# Patient Record
Sex: MALE | Race: WHITE | NOT HISPANIC OR LATINO | Employment: UNEMPLOYED | URBAN - METROPOLITAN AREA
[De-identification: names, ages, dates, MRNs, and addresses within clinical notes are randomized per-mention and may not be internally consistent; named-entity substitution may affect disease eponyms.]

---

## 2023-11-19 ENCOUNTER — PHARMACY VISIT (OUTPATIENT)
Dept: PHARMACY | Facility: CLINIC | Age: 28
End: 2023-11-19
Payer: MEDICAID

## 2023-11-19 PROCEDURE — RXMED WILLOW AMBULATORY MEDICATION CHARGE

## 2023-11-19 RX ORDER — ONDANSETRON 4 MG/1
TABLET, ORALLY DISINTEGRATING ORAL
Qty: 8 TABLET | Refills: 0 | OUTPATIENT
Start: 2023-11-19 | End: 2024-03-15 | Stop reason: HOSPADM

## 2023-11-19 RX ORDER — HYDROXYZINE HYDROCHLORIDE 50 MG/1
TABLET, FILM COATED ORAL
Qty: 30 TABLET | Refills: 0 | OUTPATIENT
Start: 2023-11-19 | End: 2023-11-28 | Stop reason: SDUPTHER

## 2023-11-19 RX ORDER — LORAZEPAM 1 MG/1
TABLET ORAL
Qty: 22 TABLET | Refills: 0 | OUTPATIENT
Start: 2023-11-19 | End: 2024-03-15 | Stop reason: HOSPADM

## 2023-11-19 RX ORDER — TRAZODONE HYDROCHLORIDE 50 MG/1
TABLET ORAL
Qty: 10 TABLET | Refills: 0 | OUTPATIENT
Start: 2023-11-19 | End: 2024-03-15 | Stop reason: HOSPADM

## 2023-11-19 RX ORDER — ESCITALOPRAM OXALATE 10 MG/1
10 TABLET ORAL EVERY MORNING
Qty: 10 TABLET | Refills: 0 | OUTPATIENT
Start: 2023-11-19 | End: 2024-03-15 | Stop reason: HOSPADM

## 2023-11-19 RX ORDER — LORAZEPAM 2 MG/1
TABLET ORAL
Qty: 6 TABLET | Refills: 0 | OUTPATIENT
Start: 2023-11-19 | End: 2024-03-15 | Stop reason: HOSPADM

## 2023-11-20 ENCOUNTER — LAB REQUISITION (OUTPATIENT)
Dept: LAB | Facility: HOSPITAL | Age: 28
End: 2023-11-20
Payer: MEDICAID

## 2023-11-20 DIAGNOSIS — Z79.899 OTHER LONG TERM (CURRENT) DRUG THERAPY: ICD-10-CM

## 2023-11-20 LAB
ALBUMIN SERPL BCP-MCNC: 3.1 G/DL (ref 3.4–5)
ALP SERPL-CCNC: 125 U/L (ref 33–120)
ALT SERPL W P-5'-P-CCNC: 53 U/L (ref 10–52)
AMPHETAMINES UR QL SCN: ABNORMAL
ANION GAP SERPL CALC-SCNC: 10 MMOL/L (ref 10–20)
AST SERPL W P-5'-P-CCNC: 79 U/L (ref 9–39)
BARBITURATES UR QL SCN: ABNORMAL
BASOPHILS # BLD AUTO: 0.03 X10*3/UL (ref 0–0.1)
BASOPHILS NFR BLD AUTO: 0.9 %
BENZODIAZ UR QL SCN: ABNORMAL
BILIRUB SERPL-MCNC: 3.4 MG/DL (ref 0–1.2)
BUN SERPL-MCNC: 8 MG/DL (ref 6–23)
BZE UR QL SCN: ABNORMAL
CALCIUM SERPL-MCNC: 8.7 MG/DL (ref 8.6–10.3)
CANNABINOIDS UR QL SCN: ABNORMAL
CHLORIDE SERPL-SCNC: 107 MMOL/L (ref 98–107)
CO2 SERPL-SCNC: 27 MMOL/L (ref 21–32)
CREAT SERPL-MCNC: 0.87 MG/DL (ref 0.5–1.3)
EOSINOPHIL # BLD AUTO: 0.09 X10*3/UL (ref 0–0.7)
EOSINOPHIL NFR BLD AUTO: 2.8 %
ERYTHROCYTE [DISTWIDTH] IN BLOOD BY AUTOMATED COUNT: 17.1 % (ref 11.5–14.5)
ETHANOL SERPL-MCNC: <10 MG/DL
FENTANYL+NORFENTANYL UR QL SCN: ABNORMAL
GFR SERPL CREATININE-BSD FRML MDRD: >90 ML/MIN/1.73M*2
GLUCOSE SERPL-MCNC: 89 MG/DL (ref 74–99)
HAV IGM SER QL: NONREACTIVE
HBV CORE IGM SER QL: NONREACTIVE
HBV SURFACE AG SERPL QL IA: NONREACTIVE
HCT VFR BLD AUTO: 40.9 % (ref 41–52)
HCV AB SER QL: REACTIVE
HGB BLD-MCNC: 13.9 G/DL (ref 13.5–17.5)
HIV 1+2 AB+HIV1 P24 AG SERPL QL IA: NONREACTIVE
IMM GRANULOCYTES # BLD AUTO: 0 X10*3/UL (ref 0–0.7)
IMM GRANULOCYTES NFR BLD AUTO: 0 % (ref 0–0.9)
LYMPHOCYTES # BLD AUTO: 1.32 X10*3/UL (ref 1.2–4.8)
LYMPHOCYTES NFR BLD AUTO: 40.4 %
MCH RBC QN AUTO: 33.4 PG (ref 26–34)
MCHC RBC AUTO-ENTMCNC: 34 G/DL (ref 32–36)
MCV RBC AUTO: 98 FL (ref 80–100)
MONOCYTES # BLD AUTO: 0.28 X10*3/UL (ref 0.1–1)
MONOCYTES NFR BLD AUTO: 8.6 %
NEUTROPHILS # BLD AUTO: 1.55 X10*3/UL (ref 1.2–7.7)
NEUTROPHILS NFR BLD AUTO: 47.3 %
NRBC BLD-RTO: 0 /100 WBCS (ref 0–0)
OPIATES UR QL SCN: ABNORMAL
OXYCODONE+OXYMORPHONE UR QL SCN: ABNORMAL
PCP UR QL SCN: ABNORMAL
PLATELET # BLD AUTO: 109 X10*3/UL (ref 150–450)
POTASSIUM SERPL-SCNC: 3.5 MMOL/L (ref 3.5–5.3)
PROT SERPL-MCNC: 7.8 G/DL (ref 6.4–8.2)
RBC # BLD AUTO: 4.16 X10*6/UL (ref 4.5–5.9)
SODIUM SERPL-SCNC: 140 MMOL/L (ref 136–145)
T PALLIDUM AB SER QL: NONREACTIVE
WBC # BLD AUTO: 3.3 X10*3/UL (ref 4.4–11.3)

## 2023-11-20 PROCEDURE — 82077 ASSAY SPEC XCP UR&BREATH IA: CPT | Mod: OUT | Performed by: NURSE PRACTITIONER

## 2023-11-20 PROCEDURE — 86481 TB AG RESPONSE T-CELL SUSP: CPT | Mod: OUT | Performed by: NURSE PRACTITIONER

## 2023-11-20 PROCEDURE — 80354 DRUG SCREENING FENTANYL: CPT | Mod: OUT,PORLAB | Performed by: NURSE PRACTITIONER

## 2023-11-20 PROCEDURE — 80307 DRUG TEST PRSMV CHEM ANLYZR: CPT | Mod: OUT | Performed by: NURSE PRACTITIONER

## 2023-11-20 PROCEDURE — 80355 GABAPENTIN NON-BLOOD: CPT | Mod: OUT | Performed by: NURSE PRACTITIONER

## 2023-11-20 PROCEDURE — 80053 COMPREHEN METABOLIC PANEL: CPT | Mod: OUT | Performed by: NURSE PRACTITIONER

## 2023-11-20 PROCEDURE — 86780 TREPONEMA PALLIDUM: CPT | Mod: OUT,PORLAB | Performed by: NURSE PRACTITIONER

## 2023-11-20 PROCEDURE — 87389 HIV-1 AG W/HIV-1&-2 AB AG IA: CPT | Mod: OUT,PORLAB | Performed by: NURSE PRACTITIONER

## 2023-11-20 PROCEDURE — 85025 COMPLETE CBC W/AUTO DIFF WBC: CPT | Mod: OUT | Performed by: NURSE PRACTITIONER

## 2023-11-20 PROCEDURE — 80074 ACUTE HEPATITIS PANEL: CPT | Mod: OUT,PORLAB | Performed by: NURSE PRACTITIONER

## 2023-11-20 PROCEDURE — 87522 HEPATITIS C REVRS TRNSCRPJ: CPT | Mod: OUT,PORLAB | Performed by: NURSE PRACTITIONER

## 2023-11-21 ENCOUNTER — PHARMACY VISIT (OUTPATIENT)
Dept: PHARMACY | Facility: CLINIC | Age: 28
End: 2023-11-21
Payer: MEDICAID

## 2023-11-21 PROCEDURE — RXMED WILLOW AMBULATORY MEDICATION CHARGE

## 2023-11-21 RX ORDER — ROPINIROLE 0.25 MG/1
TABLET, FILM COATED ORAL
Qty: 7 TABLET | Refills: 0 | OUTPATIENT
Start: 2023-11-21 | End: 2023-11-25 | Stop reason: SDUPTHER

## 2023-11-22 ENCOUNTER — PHARMACY VISIT (OUTPATIENT)
Dept: PHARMACY | Facility: CLINIC | Age: 28
End: 2023-11-22
Payer: MEDICAID

## 2023-11-22 LAB
BUPRENORPHINE SCREEN, URINE: NEGATIVE NG/ML
DRUG SCREEN COMMENT UR-IMP: NORMAL
HCV RNA SERPL NAA+PROBE-ACNC: NOT DETECTED K[IU]/ML
HCV RNA SERPL NAA+PROBE-LOG IU: NORMAL {LOG_IU}/ML

## 2023-11-22 PROCEDURE — RXMED WILLOW AMBULATORY MEDICATION CHARGE

## 2023-11-22 RX ORDER — PRAZOSIN HYDROCHLORIDE 2 MG/1
2 CAPSULE ORAL NIGHTLY
Qty: 10 CAPSULE | Refills: 0 | OUTPATIENT
Start: 2023-11-22 | End: 2024-03-15 | Stop reason: HOSPADM

## 2023-11-23 LAB
NIL(NEG) CONTROL SPOT COUNT: NORMAL
PANEL A SPOT COUNT: 2
PANEL B SPOT COUNT: 1
POS CONTROL SPOT COUNT: NORMAL
T-SPOT. TB INTERPRETATION: NEGATIVE

## 2023-11-24 LAB
FENTANYL UR CFM-MCNC: <2.5 NG/ML
GABAPENTIN UR-MCNC: <5 UG/ML
NORFENTANYL UR CFM-MCNC: <2.5 NG/ML

## 2023-11-26 ENCOUNTER — PHARMACY VISIT (OUTPATIENT)
Dept: PHARMACY | Facility: CLINIC | Age: 28
End: 2023-11-26
Payer: MEDICAID

## 2023-11-26 PROCEDURE — RXMED WILLOW AMBULATORY MEDICATION CHARGE

## 2023-11-28 ENCOUNTER — PHARMACY VISIT (OUTPATIENT)
Dept: PHARMACY | Facility: CLINIC | Age: 28
End: 2023-11-28
Payer: MEDICAID

## 2023-11-28 PROCEDURE — RXMED WILLOW AMBULATORY MEDICATION CHARGE

## 2023-11-28 RX ORDER — HYDROXYZINE HYDROCHLORIDE 50 MG/1
TABLET, FILM COATED ORAL
Qty: 30 TABLET | Refills: 0 | OUTPATIENT
Start: 2023-11-28 | End: 2024-03-15 | Stop reason: HOSPADM

## 2024-03-10 ENCOUNTER — HOSPITAL ENCOUNTER (EMERGENCY)
Facility: HOSPITAL | Age: 29
Discharge: PSYCHIATRIC OR SUBSTANCE USE FACILITY | End: 2024-03-11
Attending: EMERGENCY MEDICINE
Payer: MEDICAID

## 2024-03-10 ENCOUNTER — APPOINTMENT (OUTPATIENT)
Dept: CARDIOLOGY | Facility: HOSPITAL | Age: 29
End: 2024-03-10
Payer: MEDICAID

## 2024-03-10 DIAGNOSIS — R45.851 SUICIDAL IDEATION: Primary | ICD-10-CM

## 2024-03-10 DIAGNOSIS — F10.920 ALCOHOLIC INTOXICATION WITHOUT COMPLICATION (CMS-HCC): ICD-10-CM

## 2024-03-10 LAB
ALBUMIN SERPL BCP-MCNC: 3.9 G/DL (ref 3.4–5)
ALP SERPL-CCNC: 97 U/L (ref 33–120)
ALT SERPL W P-5'-P-CCNC: 69 U/L (ref 10–52)
ANION GAP SERPL CALC-SCNC: 14 MMOL/L (ref 10–20)
APAP SERPL-MCNC: <10 UG/ML
AST SERPL W P-5'-P-CCNC: 84 U/L (ref 9–39)
BASOPHILS # BLD AUTO: 0.03 X10*3/UL (ref 0–0.1)
BASOPHILS NFR BLD AUTO: 0.4 %
BILIRUB SERPL-MCNC: 2.3 MG/DL (ref 0–1.2)
BUN SERPL-MCNC: 10 MG/DL (ref 6–23)
CALCIUM SERPL-MCNC: 8.4 MG/DL (ref 8.6–10.3)
CHLORIDE SERPL-SCNC: 102 MMOL/L (ref 98–107)
CO2 SERPL-SCNC: 25 MMOL/L (ref 21–32)
CREAT SERPL-MCNC: 0.8 MG/DL (ref 0.5–1.3)
EGFRCR SERPLBLD CKD-EPI 2021: >90 ML/MIN/1.73M*2
EOSINOPHIL # BLD AUTO: 0.03 X10*3/UL (ref 0–0.7)
EOSINOPHIL NFR BLD AUTO: 0.4 %
ERYTHROCYTE [DISTWIDTH] IN BLOOD BY AUTOMATED COUNT: 15.2 % (ref 11.5–14.5)
ETHANOL SERPL-MCNC: 314 MG/DL
GLUCOSE SERPL-MCNC: 120 MG/DL (ref 74–99)
HCT VFR BLD AUTO: 40 % (ref 41–52)
HGB BLD-MCNC: 14.1 G/DL (ref 13.5–17.5)
IMM GRANULOCYTES # BLD AUTO: 0.01 X10*3/UL (ref 0–0.7)
IMM GRANULOCYTES NFR BLD AUTO: 0.1 % (ref 0–0.9)
LYMPHOCYTES # BLD AUTO: 1.91 X10*3/UL (ref 1.2–4.8)
LYMPHOCYTES NFR BLD AUTO: 27.8 %
MCH RBC QN AUTO: 33.7 PG (ref 26–34)
MCHC RBC AUTO-ENTMCNC: 35.3 G/DL (ref 32–36)
MCV RBC AUTO: 96 FL (ref 80–100)
MONOCYTES # BLD AUTO: 0.57 X10*3/UL (ref 0.1–1)
MONOCYTES NFR BLD AUTO: 8.3 %
NEUTROPHILS # BLD AUTO: 4.33 X10*3/UL (ref 1.2–7.7)
NEUTROPHILS NFR BLD AUTO: 63 %
NRBC BLD-RTO: 0 /100 WBCS (ref 0–0)
PLATELET # BLD AUTO: 122 X10*3/UL (ref 150–450)
POTASSIUM SERPL-SCNC: 3.4 MMOL/L (ref 3.5–5.3)
PROT SERPL-MCNC: 7.4 G/DL (ref 6.4–8.2)
RBC # BLD AUTO: 4.19 X10*6/UL (ref 4.5–5.9)
SALICYLATES SERPL-MCNC: <3 MG/DL
SODIUM SERPL-SCNC: 138 MMOL/L (ref 136–145)
WBC # BLD AUTO: 6.9 X10*3/UL (ref 4.4–11.3)

## 2024-03-10 PROCEDURE — 2500000001 HC RX 250 WO HCPCS SELF ADMINISTERED DRUGS (ALT 637 FOR MEDICARE OP): Performed by: EMERGENCY MEDICINE

## 2024-03-10 PROCEDURE — 99285 EMERGENCY DEPT VISIT HI MDM: CPT

## 2024-03-10 PROCEDURE — 80143 DRUG ASSAY ACETAMINOPHEN: CPT | Performed by: EMERGENCY MEDICINE

## 2024-03-10 PROCEDURE — 93005 ELECTROCARDIOGRAM TRACING: CPT

## 2024-03-10 PROCEDURE — 80053 COMPREHEN METABOLIC PANEL: CPT | Performed by: EMERGENCY MEDICINE

## 2024-03-10 PROCEDURE — 85025 COMPLETE CBC W/AUTO DIFF WBC: CPT | Performed by: EMERGENCY MEDICINE

## 2024-03-10 PROCEDURE — 36415 COLL VENOUS BLD VENIPUNCTURE: CPT | Performed by: EMERGENCY MEDICINE

## 2024-03-10 RX ORDER — LORAZEPAM 0.5 MG/1
1 TABLET ORAL ONCE
Status: COMPLETED | OUTPATIENT
Start: 2024-03-10 | End: 2024-03-10

## 2024-03-10 RX ADMIN — LORAZEPAM 1 MG: 0.5 TABLET ORAL at 22:50

## 2024-03-10 SDOH — HEALTH STABILITY: MENTAL HEALTH: HAVE YOU ACTUALLY HAD ANY THOUGHTS OF KILLING YOURSELF?: NO

## 2024-03-10 SDOH — HEALTH STABILITY: MENTAL HEALTH: HAVE YOU WISHED YOU WERE DEAD OR WISHED YOU COULD GO TO SLEEP AND NOT WAKE UP?: YES

## 2024-03-10 SDOH — HEALTH STABILITY: MENTAL HEALTH: BEHAVIORS/MOOD: COOPERATIVE;ANXIOUS;TEARFUL

## 2024-03-10 SDOH — HEALTH STABILITY: MENTAL HEALTH: BEHAVIORS/MOOD: ANXIOUS;COOPERATIVE;APPROPRIATE FOR AGE;APPROPRIATE FOR SITUATION;TEARFUL

## 2024-03-10 SDOH — HEALTH STABILITY: MENTAL HEALTH: BEHAVIORS/MOOD: SLEEPING

## 2024-03-10 SDOH — HEALTH STABILITY: MENTAL HEALTH: HAVE YOU EVER DONE ANYTHING, STARTED TO DO ANYTHING, OR PREPARED TO DO ANYTHING TO END YOUR LIFE?: NO

## 2024-03-10 SDOH — HEALTH STABILITY: MENTAL HEALTH: SUICIDE ASSESSMENT: ADULT (C-SSRS)

## 2024-03-10 SDOH — HEALTH STABILITY: MENTAL HEALTH: CONTENT: UNREMARKABLE

## 2024-03-10 SDOH — HEALTH STABILITY: MENTAL HEALTH

## 2024-03-10 ASSESSMENT — LIFESTYLE VARIABLES
NAUSEA AND VOMITING: NO NAUSEA AND NO VOMITING
AGITATION: NORMAL ACTIVITY
PAROXYSMAL SWEATS: NO SWEAT VISIBLE
ANXIETY: 2
AUDITORY DISTURBANCES: NOT PRESENT
HEADACHE, FULLNESS IN HEAD: NOT PRESENT
TREMOR: NO TREMOR
BLOOD PRESSURE: 139/80
TOTAL SCORE: 2
PULSE: 102
VISUAL DISTURBANCES: NOT PRESENT
ORIENTATION AND CLOUDING OF SENSORIUM: ORIENTED AND CAN DO SERIAL ADDITIONS

## 2024-03-10 NOTE — ED PROVIDER NOTES
HPI   Chief Complaint   Patient presents with    Suicidal    Alcohol Problem       29-year-old male who presented through triage for suicidal ideation and alcohol abuse.  Patient states that he drinks daily his last drink was right prior to coming in.  He states he has been having thoughts of suicide but has no plan.  He also admits to smoking marijuana.  He states he has a history of cirrhosis secondary to his alcoholism and mental health issues.                          Sally Coma Scale Score: 15                     Patient History   History reviewed. No pertinent past medical history.  History reviewed. No pertinent surgical history.  No family history on file.  Social History     Tobacco Use    Smoking status: Not on file    Smokeless tobacco: Not on file   Substance Use Topics    Alcohol use: Not on file    Drug use: Not on file       Physical Exam   ED Triage Vitals [03/10/24 1912]   Temperature Heart Rate Respirations BP   37 °C (98.6 °F) (!) 102 18 139/80      Pulse Ox Temp Source Heart Rate Source Patient Position   97 % Temporal -- --      BP Location FiO2 (%)     -- --       Physical Exam  Constitutional:       Appearance: Normal appearance. He is normal weight.   HENT:      Head: Normocephalic and atraumatic.      Nose: Nose normal.      Mouth/Throat:      Mouth: Mucous membranes are moist.      Pharynx: Oropharynx is clear.   Eyes:      Extraocular Movements: Extraocular movements intact.      Conjunctiva/sclera: Conjunctivae normal.      Pupils: Pupils are equal, round, and reactive to light.   Cardiovascular:      Rate and Rhythm: Normal rate and regular rhythm.   Pulmonary:      Effort: Pulmonary effort is normal.      Breath sounds: Normal breath sounds.   Abdominal:      General: Abdomen is flat. Bowel sounds are normal.      Palpations: Abdomen is soft.   Musculoskeletal:         General: Normal range of motion.      Cervical back: Normal range of motion and neck supple.   Skin:     General:  Skin is warm and dry.      Capillary Refill: Capillary refill takes less than 2 seconds.   Neurological:      General: No focal deficit present.      Mental Status: He is alert.   Psychiatric:         Mood and Affect: Mood normal.         Behavior: Behavior normal.         Thought Content: Thought content normal.         Judgment: Judgment normal.       Labs Reviewed   CBC WITH AUTO DIFFERENTIAL - Abnormal       Result Value    WBC 6.9      nRBC 0.0      RBC 4.19 (*)     Hemoglobin 14.1      Hematocrit 40.0 (*)     MCV 96      MCH 33.7      MCHC 35.3      RDW 15.2 (*)     Platelets 122 (*)     Neutrophils % 63.0      Immature Granulocytes %, Automated 0.1      Lymphocytes % 27.8      Monocytes % 8.3      Eosinophils % 0.4      Basophils % 0.4      Neutrophils Absolute 4.33      Immature Granulocytes Absolute, Automated 0.01      Lymphocytes Absolute 1.91      Monocytes Absolute 0.57      Eosinophils Absolute 0.03      Basophils Absolute 0.03     COMPREHENSIVE METABOLIC PANEL - Abnormal    Glucose 120 (*)     Sodium 138      Potassium 3.4 (*)     Chloride 102      Bicarbonate 25      Anion Gap 14      Urea Nitrogen 10      Creatinine 0.80      eGFR >90      Calcium 8.4 (*)     Albumin 3.9      Alkaline Phosphatase 97      Total Protein 7.4      AST 84 (*)     Bilirubin, Total 2.3 (*)     ALT 69 (*)    DRUG SCREEN,URINE - Abnormal    Amphetamine Screen, Urine Presumptive Negative      Barbiturate Screen, Urine Presumptive Negative      Benzodiazepines Screen, Urine Presumptive Negative      Cannabinoid Screen, Urine Presumptive Positive (*)     Cocaine Metabolite Screen, Urine Presumptive Negative      Fentanyl Screen, Urine Presumptive Negative      Opiate Screen, Urine Presumptive Negative      Oxycodone Screen, Urine Presumptive Negative      PCP Screen, Urine Presumptive Negative      Methadone Screen, Urine Presumptive Negative      Narrative:     Drug screen results are presumptive and should not be used to  assess   compliance with prescribed medication. Contact the performing [facility] [[email or web address]] laboratory   to add-on definitive confirmatory testing if clinically indicated.    Toxicology screening results are reported qualitatively. The concentration must   be greater than or equal to the cutoff to be reported as positive. The concentration   at which the screening test can detect an individual drug or metabolite varies.   The absence of expected drug(s) and/or drug metabolite(s) may indicate non-compliance,   inappropriate timing of specimen collection relative to drug administration, poor drug   absorption, diluted/adulterated urine, or limitations of testing. For medical purposes   only; not valid for forensic use.    Interpretive questions should be directed to the laboratory medical directors.   ACUTE TOXICOLOGY PANEL, BLOOD - Abnormal    Acetaminophen <10.0      Salicylate  <3      Alcohol 314 (*)    ALCOHOL - Abnormal    Alcohol 168 (*)    URINALYSIS WITH REFLEX MICROSCOPIC - Abnormal    Color, Urine Tanya (*)     Appearance, Urine Hazy (*)     Specific Gravity, Urine 1.034      pH, Urine 5.0      Protein, Urine 100 (2+) (*)     Glucose, Urine NEGATIVE      Blood, Urine LARGE (3+) (*)     Ketones, Urine NEGATIVE      Bilirubin, Urine SMALL (1+) (*)     Urobilinogen, Urine 4.0 (*)     Nitrite, Urine NEGATIVE      Leukocyte Esterase, Urine NEGATIVE     MICROSCOPIC ONLY, URINE - Abnormal    WBC, Urine 1-5      RBC, Urine 11-20 (*)     Mucus, Urine 4+      Hyaline Casts, Urine 4+ (*)    SARS-COV-2 AND INFLUENZA A/B PCR - Normal    Flu A Result Not Detected      Flu B Result Not Detected      Coronavirus 2019, PCR Not Detected      Narrative:     This assay has received FDA Emergency Use Authorization (EUA) and  is only authorized for the duration of time that circumstances exist to justify the authorization of the emergency use of in vitro diagnostic tests for the detection of SARS-CoV-2 virus and/or diagnosis of COVID-19  infection under section 564(b)(1) of the Act, 21 U.S.C. 360bbb-3(b)(1). Testing for SARS-CoV-2 is only recommended for patients who meet current clinical and/or epidemiological criteria as defined by federal, state, or local public health directives. This assay is an in vitro diagnostic nucleic acid amplification test for the qualitative detection of SARS-CoV-2, Influenza A, and Influenza B from nasopharyngeal specimens and has been validated for use at [facility]. Negative results do not preclude COVID-19 infections or Influenza A/B infections, and should not be used as the sole basis for diagnosis, treatment, or other management decisions. If Influenza A/B and RSV PCR results are negative, testing for Parainfluenza virus, Adenovirus and Metapneumovirus is routinely performed for [facility] pediatric oncology and intensive care inpatients, and is available on other patients by placing an add-on request.          ED Course & MDM   ED Course as of 03/11/24 2044   Mon Mar 11, 2024   0732 I received signout patient is pending placement for suicidal ideations [ZS]   1506 Patient accepted to [facility] by  [ZS]   1606 Patient has chronically elevated bilirubin and AST and ALT which are no acutely different today.  He has no right upper quadrant abdominal pain. [ZS]   1607 He was medically cleared by previous attending. [ZS]      ED Course User Index  [ZS] Buck Valerio MD         Diagnoses as of 03/11/24 2044   Suicidal ideation   Alcoholic intoxication without complication (CMS/HCC)       Medical Decision Making  Emergency department course, laboratory studies were obtained and reviewed.  Patient's alcohol level is elevated at 314.  Patient will be evaluated by EPAT.  I feel he is medically cleared for psychiatric evaluation.    Amount and/or Complexity of Data Reviewed  ECG/medicine tests: independent interpretation performed.     Details: EKG shows a normal sinus rhythm at a rate of  98 with a prolonged QTc of 491 and nonspecific ST-T wave changes, interpreted by ED physician.        Procedure  Procedures     Gina Lan,   03/10/24 2201       Gina Lan,   03/11/24 2044

## 2024-03-11 ENCOUNTER — HOSPITAL ENCOUNTER (INPATIENT)
Facility: HOSPITAL | Age: 29
LOS: 4 days | Discharge: HOME | End: 2024-03-15
Attending: PSYCHIATRY & NEUROLOGY | Admitting: PSYCHIATRY & NEUROLOGY
Payer: MEDICAID

## 2024-03-11 VITALS
HEART RATE: 98 BPM | RESPIRATION RATE: 20 BRPM | OXYGEN SATURATION: 98 % | SYSTOLIC BLOOD PRESSURE: 118 MMHG | TEMPERATURE: 98.6 F | DIASTOLIC BLOOD PRESSURE: 64 MMHG

## 2024-03-11 DIAGNOSIS — K70.31 ASCITES DUE TO ALCOHOLIC CIRRHOSIS (MULTI): ICD-10-CM

## 2024-03-11 DIAGNOSIS — F99 MENTAL DISORDER: Primary | ICD-10-CM

## 2024-03-11 LAB
AMPHETAMINES UR QL SCN: ABNORMAL
APPEARANCE UR: ABNORMAL
BARBITURATES UR QL SCN: ABNORMAL
BENZODIAZ UR QL SCN: ABNORMAL
BILIRUB UR STRIP.AUTO-MCNC: ABNORMAL MG/DL
BZE UR QL SCN: ABNORMAL
CANNABINOIDS UR QL SCN: ABNORMAL
COLOR UR: ABNORMAL
ETHANOL SERPL-MCNC: 168 MG/DL
FENTANYL+NORFENTANYL UR QL SCN: ABNORMAL
FLUAV RNA RESP QL NAA+PROBE: NOT DETECTED
FLUBV RNA RESP QL NAA+PROBE: NOT DETECTED
GLUCOSE UR STRIP.AUTO-MCNC: NEGATIVE MG/DL
HYALINE CASTS #/AREA URNS AUTO: ABNORMAL /LPF
KETONES UR STRIP.AUTO-MCNC: NEGATIVE MG/DL
LEUKOCYTE ESTERASE UR QL STRIP.AUTO: NEGATIVE
METHADONE UR QL SCN: ABNORMAL
MUCOUS THREADS #/AREA URNS AUTO: ABNORMAL /LPF
NITRITE UR QL STRIP.AUTO: NEGATIVE
OPIATES UR QL SCN: ABNORMAL
OXYCODONE+OXYMORPHONE UR QL SCN: ABNORMAL
PCP UR QL SCN: ABNORMAL
PH UR STRIP.AUTO: 5 [PH]
PROT UR STRIP.AUTO-MCNC: ABNORMAL MG/DL
RBC # UR STRIP.AUTO: ABNORMAL /UL
RBC #/AREA URNS AUTO: ABNORMAL /HPF
SARS-COV-2 RNA RESP QL NAA+PROBE: NOT DETECTED
SP GR UR STRIP.AUTO: 1.03
UROBILINOGEN UR STRIP.AUTO-MCNC: 4 MG/DL
WBC #/AREA URNS AUTO: ABNORMAL /HPF

## 2024-03-11 PROCEDURE — 2500000001 HC RX 250 WO HCPCS SELF ADMINISTERED DRUGS (ALT 637 FOR MEDICARE OP): Performed by: REGISTERED NURSE

## 2024-03-11 PROCEDURE — 81001 URINALYSIS AUTO W/SCOPE: CPT | Mod: 59 | Performed by: STUDENT IN AN ORGANIZED HEALTH CARE EDUCATION/TRAINING PROGRAM

## 2024-03-11 PROCEDURE — 2500000001 HC RX 250 WO HCPCS SELF ADMINISTERED DRUGS (ALT 637 FOR MEDICARE OP): Performed by: EMERGENCY MEDICINE

## 2024-03-11 PROCEDURE — 1240000001 HC SEMI-PRIVATE BH ROOM DAILY

## 2024-03-11 PROCEDURE — 82077 ASSAY SPEC XCP UR&BREATH IA: CPT | Performed by: EMERGENCY MEDICINE

## 2024-03-11 PROCEDURE — 87636 SARSCOV2 & INF A&B AMP PRB: CPT | Performed by: STUDENT IN AN ORGANIZED HEALTH CARE EDUCATION/TRAINING PROGRAM

## 2024-03-11 PROCEDURE — 2500000002 HC RX 250 W HCPCS SELF ADMINISTERED DRUGS (ALT 637 FOR MEDICARE OP, ALT 636 FOR OP/ED): Performed by: EMERGENCY MEDICINE

## 2024-03-11 PROCEDURE — 36415 COLL VENOUS BLD VENIPUNCTURE: CPT | Performed by: EMERGENCY MEDICINE

## 2024-03-11 PROCEDURE — 80307 DRUG TEST PRSMV CHEM ANLYZR: CPT | Performed by: EMERGENCY MEDICINE

## 2024-03-11 RX ORDER — LORAZEPAM 0.5 MG/1
0.5 TABLET ORAL EVERY 2 HOUR PRN
Status: DISCONTINUED | OUTPATIENT
Start: 2024-03-11 | End: 2024-03-13

## 2024-03-11 RX ORDER — LORAZEPAM 1 MG/1
2 TABLET ORAL EVERY 2 HOUR PRN
Status: DISCONTINUED | OUTPATIENT
Start: 2024-03-11 | End: 2024-03-13

## 2024-03-11 RX ORDER — TRAZODONE HYDROCHLORIDE 50 MG/1
50 TABLET ORAL NIGHTLY PRN
Status: DISCONTINUED | OUTPATIENT
Start: 2024-03-11 | End: 2024-03-15 | Stop reason: HOSPADM

## 2024-03-11 RX ORDER — IBUPROFEN 600 MG/1
600 TABLET ORAL EVERY 8 HOURS PRN
Status: DISCONTINUED | OUTPATIENT
Start: 2024-03-11 | End: 2024-03-15 | Stop reason: HOSPADM

## 2024-03-11 RX ORDER — IBUPROFEN 200 MG
1 TABLET ORAL DAILY
Status: DISCONTINUED | OUTPATIENT
Start: 2024-03-11 | End: 2024-03-15 | Stop reason: HOSPADM

## 2024-03-11 RX ORDER — FOLIC ACID 1 MG/1
1 TABLET ORAL DAILY
Status: DISCONTINUED | OUTPATIENT
Start: 2024-03-12 | End: 2024-03-15 | Stop reason: HOSPADM

## 2024-03-11 RX ORDER — DIPHENHYDRAMINE HYDROCHLORIDE 50 MG/ML
50 INJECTION INTRAMUSCULAR; INTRAVENOUS ONCE AS NEEDED
Status: DISCONTINUED | OUTPATIENT
Start: 2024-03-11 | End: 2024-03-15 | Stop reason: HOSPADM

## 2024-03-11 RX ORDER — IBUPROFEN 200 MG
1 TABLET ORAL DAILY
Status: DISCONTINUED | OUTPATIENT
Start: 2024-03-11 | End: 2024-03-11 | Stop reason: HOSPADM

## 2024-03-11 RX ORDER — LORAZEPAM 1 MG/1
1 TABLET ORAL EVERY 2 HOUR PRN
Status: DISCONTINUED | OUTPATIENT
Start: 2024-03-11 | End: 2024-03-13

## 2024-03-11 RX ORDER — MAGNESIUM HYDROXIDE 2400 MG/10ML
10 SUSPENSION ORAL DAILY PRN
Status: DISCONTINUED | OUTPATIENT
Start: 2024-03-11 | End: 2024-03-15 | Stop reason: HOSPADM

## 2024-03-11 RX ORDER — DIPHENHYDRAMINE HCL 25 MG
50 TABLET ORAL EVERY 6 HOURS PRN
Status: DISCONTINUED | OUTPATIENT
Start: 2024-03-11 | End: 2024-03-15 | Stop reason: HOSPADM

## 2024-03-11 RX ORDER — DIPHENHYDRAMINE HCL 25 MG
25 CAPSULE ORAL ONCE
Status: DISCONTINUED | OUTPATIENT
Start: 2024-03-11 | End: 2024-03-11

## 2024-03-11 RX ORDER — TRAZODONE HYDROCHLORIDE 50 MG/1
50 TABLET ORAL ONCE
Status: COMPLETED | OUTPATIENT
Start: 2024-03-11 | End: 2024-03-11

## 2024-03-11 RX ORDER — IBUPROFEN 800 MG/1
800 TABLET ORAL ONCE
Status: COMPLETED | OUTPATIENT
Start: 2024-03-11 | End: 2024-03-11

## 2024-03-11 RX ORDER — QUETIAPINE FUMARATE 25 MG/1
25 TABLET, FILM COATED ORAL ONCE
Status: COMPLETED | OUTPATIENT
Start: 2024-03-11 | End: 2024-03-11

## 2024-03-11 RX ORDER — MULTIVIT-MIN/IRON FUM/FOLIC AC 7.5 MG-4
1 TABLET ORAL DAILY
Status: DISCONTINUED | OUTPATIENT
Start: 2024-03-12 | End: 2024-03-15 | Stop reason: HOSPADM

## 2024-03-11 RX ORDER — HYDROXYZINE HYDROCHLORIDE 25 MG/1
50 TABLET, FILM COATED ORAL EVERY 6 HOURS PRN
Status: DISCONTINUED | OUTPATIENT
Start: 2024-03-11 | End: 2024-03-15 | Stop reason: HOSPADM

## 2024-03-11 RX ORDER — LANOLIN ALCOHOL/MO/W.PET/CERES
100 CREAM (GRAM) TOPICAL DAILY
Status: DISCONTINUED | OUTPATIENT
Start: 2024-03-12 | End: 2024-03-11

## 2024-03-11 RX ORDER — ALUMINUM HYDROXIDE, MAGNESIUM HYDROXIDE, AND SIMETHICONE 1200; 120; 1200 MG/30ML; MG/30ML; MG/30ML
10 SUSPENSION ORAL 4 TIMES DAILY PRN
Status: DISCONTINUED | OUTPATIENT
Start: 2024-03-11 | End: 2024-03-15 | Stop reason: HOSPADM

## 2024-03-11 RX ORDER — LANOLIN ALCOHOL/MO/W.PET/CERES
100 CREAM (GRAM) TOPICAL DAILY
Status: DISCONTINUED | OUTPATIENT
Start: 2024-03-12 | End: 2024-03-15 | Stop reason: HOSPADM

## 2024-03-11 RX ADMIN — TRAZODONE HYDROCHLORIDE 50 MG: 50 TABLET ORAL at 21:52

## 2024-03-11 RX ADMIN — QUETIAPINE FUMARATE 25 MG: 25 TABLET ORAL at 01:07

## 2024-03-11 RX ADMIN — LORAZEPAM 1 MG: 1 TABLET ORAL at 21:52

## 2024-03-11 RX ADMIN — IBUPROFEN 800 MG: 800 TABLET, FILM COATED ORAL at 01:07

## 2024-03-11 RX ADMIN — TRAZODONE HYDROCHLORIDE 50 MG: 50 TABLET ORAL at 01:07

## 2024-03-11 SDOH — HEALTH STABILITY: MENTAL HEALTH: ANXIETY SYMPTOMS: NO PROBLEMS REPORTED OR OBSERVED.

## 2024-03-11 SDOH — HEALTH STABILITY: MENTAL HEALTH: BEHAVIORS/MOOD: ANXIOUS;COOPERATIVE

## 2024-03-11 SDOH — SOCIAL STABILITY: SOCIAL INSECURITY: DO YOU FEEL ANYONE HAS EXPLOITED OR TAKEN ADVANTAGE OF YOU FINANCIALLY OR OF YOUR PERSONAL PROPERTY?: NO

## 2024-03-11 SDOH — HEALTH STABILITY: MENTAL HEALTH: ARE YOU HAVING THOUGHTS OF KILLING YOURSELF RIGHT NOW?: YES

## 2024-03-11 SDOH — HEALTH STABILITY: MENTAL HEALTH: HAVE YOU ACTUALLY HAD ANY THOUGHTS OF KILLING YOURSELF?: NO

## 2024-03-11 SDOH — HEALTH STABILITY: MENTAL HEALTH: BEHAVIORS/MOOD: SLEEPING

## 2024-03-11 SDOH — HEALTH STABILITY: MENTAL HEALTH: HAVE YOU EVER TRIED TO KILL YOURSELF?: YES

## 2024-03-11 SDOH — HEALTH STABILITY: MENTAL HEALTH: BEHAVIORS/MOOD: CALM;COOPERATIVE

## 2024-03-11 SDOH — HEALTH STABILITY: MENTAL HEALTH: ACTIVE SUICIDAL IDEATION WITH SPECIFIC PLAN AND INTENT (PAST 1 MONTH): NO

## 2024-03-11 SDOH — SOCIAL STABILITY: SOCIAL INSECURITY: ARE THERE ANY APPARENT SIGNS OF INJURIES/BEHAVIORS THAT COULD BE RELATED TO ABUSE/NEGLECT?: NO

## 2024-03-11 SDOH — SOCIAL STABILITY: SOCIAL INSECURITY: DOES ANYONE TRY TO KEEP YOU FROM HAVING/CONTACTING OTHER FRIENDS OR DOING THINGS OUTSIDE YOUR HOME?: NO

## 2024-03-11 SDOH — HEALTH STABILITY: MENTAL HEALTH: IN THE PAST WEEK, HAVE YOU BEEN HAVING THOUGHTS ABOUT KILLING YOURSELF?: YES

## 2024-03-11 SDOH — ECONOMIC STABILITY: GENERAL: FINANCIAL CONCERNS: UNABLE TO PAY BILLS

## 2024-03-11 SDOH — HEALTH STABILITY: MENTAL HEALTH: CONTENT: UNREMARKABLE

## 2024-03-11 SDOH — SOCIAL STABILITY: SOCIAL INSECURITY: ABUSE: ADULT

## 2024-03-11 SDOH — HEALTH STABILITY: MENTAL HEALTH: IN THE PAST FEW WEEKS, HAVE YOU FELT THAT YOU OR YOUR FAMILY WOULD BE BETTER OFF IF YOU WERE DEAD?: YES

## 2024-03-11 SDOH — HEALTH STABILITY: MENTAL HEALTH: SUICIDAL BEHAVIOR (3 MONTHS): NO

## 2024-03-11 SDOH — HEALTH STABILITY: MENTAL HEALTH: IN THE PAST FEW WEEKS, HAVE YOU WISHED YOU WERE DEAD?: YES

## 2024-03-11 SDOH — HEALTH STABILITY: MENTAL HEALTH: ACTIVE SUICIDAL IDEATION WITH SOME INTENT TO ACT, WITHOUT SPECIFIC PLAN (PAST 1 MONTH): YES

## 2024-03-11 SDOH — HEALTH STABILITY: MENTAL HEALTH: SUICIDE ASSESSMENT: ADULT (C-SSRS)

## 2024-03-11 SDOH — HEALTH STABILITY: MENTAL HEALTH: HAVE YOU EVER DONE ANYTHING, STARTED TO DO ANYTHING, OR PREPARED TO DO ANYTHING TO END YOUR LIFE?: NO

## 2024-03-11 SDOH — HEALTH STABILITY: MENTAL HEALTH: HOW DID YOU TRY TO KILL YOURSELF?: PT WOULD NOT STATE

## 2024-03-11 SDOH — HEALTH STABILITY: MENTAL HEALTH: WISH TO BE DEAD (PAST 1 MONTH): YES

## 2024-03-11 SDOH — SOCIAL STABILITY: SOCIAL INSECURITY: ARE YOU OR HAVE YOU BEEN THREATENED OR ABUSED PHYSICALLY, EMOTIONALLY, OR SEXUALLY BY ANYONE?: NO

## 2024-03-11 SDOH — SOCIAL STABILITY: SOCIAL INSECURITY: WERE YOU ABLE TO COMPLETE ALL THE BEHAVIORAL HEALTH SCREENINGS?: YES

## 2024-03-11 SDOH — SOCIAL STABILITY: SOCIAL INSECURITY: HAVE YOU HAD THOUGHTS OF HARMING ANYONE ELSE?: NO

## 2024-03-11 SDOH — HEALTH STABILITY: MENTAL HEALTH
DEPRESSION SYMPTOMS: CRYING;FEELINGS OF HELPLESSNESS;FEELINGS OF HOPELESSESS;FEELINGS OF WORTHLESSNESS;INCREASED IRRITABILITY;ISOLATIVE;LOSS OF INTEREST;SLEEP DISTURBANCE

## 2024-03-11 SDOH — SOCIAL STABILITY: SOCIAL INSECURITY: HAS ANYONE EVER THREATENED TO HURT YOUR FAMILY OR YOUR PETS?: NO

## 2024-03-11 SDOH — HEALTH STABILITY: MENTAL HEALTH: HAVE YOU WISHED YOU WERE DEAD OR WISHED YOU COULD GO TO SLEEP AND NOT WAKE UP?: YES

## 2024-03-11 SDOH — HEALTH STABILITY: MENTAL HEALTH: SUICIDAL BEHAVIOR (LIFETIME): YES

## 2024-03-11 SDOH — SOCIAL STABILITY: SOCIAL INSECURITY: DO YOU FEEL UNSAFE GOING BACK TO THE PLACE WHERE YOU ARE LIVING?: NO

## 2024-03-11 SDOH — HEALTH STABILITY: MENTAL HEALTH

## 2024-03-11 SDOH — HEALTH STABILITY: MENTAL HEALTH: NON-SPECIFIC ACTIVE SUICIDAL THOUGHTS (PAST 1 MONTH): YES

## 2024-03-11 ASSESSMENT — LIFESTYLE VARIABLES
EVER HAD A DRINK FIRST THING IN THE MORNING TO STEADY YOUR NERVES TO GET RID OF A HANGOVER: NO
HOW MANY STANDARD DRINKS CONTAINING ALCOHOL DO YOU HAVE ON A TYPICAL DAY: 10 OR MORE
VISUAL DISTURBANCES: NOT PRESENT
HAVE YOU EVER FELT YOU SHOULD CUT DOWN ON YOUR DRINKING: NO
HOW OFTEN DO YOU HAVE 6 OR MORE DRINKS ON ONE OCCASION: DAILY OR ALMOST DAILY
PRESCIPTION_ABUSE_PAST_12_MONTHS: NO
PULSE: 80
PAROXYSMAL SWEATS: BARELY PERCEPTIBLE SWEATING, PALMS MOIST
HAVE PEOPLE ANNOYED YOU BY CRITICIZING YOUR DRINKING: NO
HOW OFTEN DO YOU HAVE A DRINK CONTAINING ALCOHOL: 4 OR MORE TIMES A WEEK
VISUAL DISTURBANCES: NOT PRESENT
TOTAL SCORE: 8
TOTAL_SCORE: 6
TREMOR: NOT VISIBLE, BUT CAN BE FELT FINGERTIP TO FINGERTIP
EVER FELT BAD OR GUILTY ABOUT YOUR DRINKING: NO
PULSE: 76
SUBSTANCE_ABUSE_PAST_12_MONTHS: YES
SKIP TO QUESTIONS 9-10: 0
CIWA TOTAL SCORE: 2
HEADACHE, FULLNESS IN HEAD: NOT PRESENT
AUDITORY DISTURBANCES: NOT PRESENT
AUDIT-C TOTAL SCORE: 12
AUDITORY DISTURBANCES: NOT PRESENT
AGITATION: 2
TREMOR: NOT VISIBLE, BUT CAN BE FELT FINGERTIP TO FINGERTIP
NAUSEA AND VOMITING: NO NAUSEA AND NO VOMITING
ORIENTATION AND CLOUDING OF SENSORIUM: ORIENTED AND CAN DO SERIAL ADDITIONS
PAROXYSMAL SWEATS: NO SWEAT VISIBLE
HEADACHE, FULLNESS IN HEAD: NOT PRESENT
AGITATION: NORMAL ACTIVITY
ORIENTATION AND CLOUDING OF SENSORIUM: ORIENTED AND CAN DO SERIAL ADDITIONS
ANXIETY: MODERATELY ANXIOUS, OR GUARDED, SO ANXIETY IS INFERRED
AUDIT-C TOTAL SCORE: 12
ANXIETY: MILDLY ANXIOUS
BLOOD PRESSURE: 120/58
BLOOD PRESSURE: 138/83
NAUSEA AND VOMITING: NO NAUSEA AND NO VOMITING

## 2024-03-11 ASSESSMENT — ABNORMAL INVOLUNTARY MOVEMENT SCALE (AIMS)
LIPS_PARIETAL: NONE, NORMAL
UPPER_BODY_EXTREMITIES: NONE, NORMAL
AIMS_PATIENT_INCAPACITATION: NONE, NORMAL
NECK_SHOULDER_HIPS: NONE, NORMAL
LOWER_BODY_EXTREMITIES: NONE, NORMAL
TONGUE: NONE, NORMAL
AIMS_PATIENT_AWARENESS: NO AWARENESS
PATIENT_WEARS_DENTURES: NO
CURRENT_PROBLEMS_TEETH_DENTURES: NO
JAW: NONE, NORMAL
FACIAL_EXPRESSION_MUSCLES: NONE, NORMAL

## 2024-03-11 ASSESSMENT — PAIN SCALES - GENERAL
PAINLEVEL_OUTOF10: 0 - NO PAIN
PAINLEVEL_OUTOF10: 0 - NO PAIN

## 2024-03-11 ASSESSMENT — PATIENT HEALTH QUESTIONNAIRE - PHQ9
2. FEELING DOWN, DEPRESSED OR HOPELESS: SEVERAL DAYS
1. LITTLE INTEREST OR PLEASURE IN DOING THINGS: SEVERAL DAYS
SUM OF ALL RESPONSES TO PHQ9 QUESTIONS 1 & 2: 2

## 2024-03-11 ASSESSMENT — ACTIVITIES OF DAILY LIVING (ADL)
ADEQUATE_TO_COMPLETE_ADL: YES
FEEDING YOURSELF: INDEPENDENT
HEARING - RIGHT EAR: FUNCTIONAL
HEARING - LEFT EAR: FUNCTIONAL
DRESSING YOURSELF: INDEPENDENT
LACK_OF_TRANSPORTATION: YES
WALKS IN HOME: INDEPENDENT
PATIENT'S MEMORY ADEQUATE TO SAFELY COMPLETE DAILY ACTIVITIES?: YES
GROOMING: INDEPENDENT
TOILETING: INDEPENDENT
BATHING: INDEPENDENT
JUDGMENT_ADEQUATE_SAFELY_COMPLETE_DAILY_ACTIVITIES: YES

## 2024-03-11 ASSESSMENT — PAIN - FUNCTIONAL ASSESSMENT
PAIN_FUNCTIONAL_ASSESSMENT: 0-10
PAIN_FUNCTIONAL_ASSESSMENT: 0-10

## 2024-03-11 ASSESSMENT — PAIN DESCRIPTION - PROGRESSION: CLINICAL_PROGRESSION: NOT CHANGED

## 2024-03-11 NOTE — PROGRESS NOTES
EPAT - Social Work Psychiatric Assessment    Arrival Details  Mode of Arrival: Ambulatory  Admission Source: Home  Admission Type: Voluntary  EPAT Assessment Start Date: 03/11/24  EPAT Assessment Start Time: 0300  Name of : ELBERT Jones LISW    History of Present Illness  Admission Reason: Suicidal    HPI: Pt, who is a 29 year old male, presents to the [facility] ED with a chief complaint of suicidal ideation and wanting help for his alcohol addiction. Prior to assessment, pt’s provider note, triage note, and community record were reviewed. Today, pt reported that a friend dropped him off at the ED so he could get help. Pt triaged as “low risk” on his [place] risk screening tool. In the ED, pt was noted to be intoxicated with a BAL= 314. Pt told ED physician that he was having suicidal thoughts without a plan. EPAT was consulted for further evaluation. Repeat BAL=168 and ED reported that pt is now clinically sober. For this assessment, pt presents as mildly intoxicated. He continued to endorse suicidal thoughts, opining that he might do “anything” to kill himself adding that he “might do it” if he leaves the ED without help. Pt is motivated towards wanting treatment and states that he needs psychiatric help and help with his addiction.         Pt has a past psychiatric history of bipolar disorder, ASA, and alcohol use disorder. Pt recently started a PHP program at [facility] in [place] to address mental health/psychiatric needs. Notes from the intake appointment indicate that pt must remain sober to participate in their programming. Pt has a history of taking psychiatric medications but reported that it has been more than a month since he last had his medication. Multiple moves, inconsistent follow up and substance abuse are primary factors in medication noncompliance. Pt reported that he has a history of psychiatric admissions but cannot recall dates/locations. His last documented psychiatric  admission appears to be in 2016 to [facility] but has had several ED visits in recent months requesting detox/rehab services from a [facility]. Pt has a history of suicide attempts.         Pt is recently homeless. He was living with his girlfriend (now ex) in [place], [place]. Pt has 3 children, all living with their mothers. Pt has dealt with homelessness on/off for years. He is currently unemployed, reporting today that he was fired from his job this week.    SW Readmission Information   Readmission within 30 Days: No    Psychiatric Symptoms  Anxiety Symptoms: No problems reported or observed.  Depression Symptoms: Crying, Feelings of helplessness, Feelings of hopelessess, Feelings of worthlessness, Increased irritability, Isolative, Loss of interest, Sleep disturbance  Wanda Symptoms: No problems reported or observed.    Psychosis Symptoms  Hallucination Type: No problems reported or observed.  Delusion Type: No problems reported or observed.    Additional Symptoms - Adult  Generalized Anxiety Disorder: No problems reported or observed.  Obsessive Compulsive Disorder: No problems reported or observed.  Panic Attack: No problems reported or observed.  Post Traumatic Stress Disorder: No problems reported or observed.  Delirium: No problems reported or observed.    Past Psychiatric History/Meds/Treatments  Past Psychiatric History: History of admissions. Last documented was [facility]. Other admissions to [facility]  Past Psychiatric Meds/Treatments: Seroquel, minipress, atarax  Past Violence/Victimization History: Pt denied    Current Mental Health Contacts   Name/Phone Number: None   Last Appointment Date: N/A  Provider Name/Phone Number: None  Provider Last Appointment Date: N/A    Support System: Friends    Living Arrangement: Homeless (Stays with friends)         Income Information  Employment Status for: Patient  Employment Status: Unemployed  Income Source: Unemployed  Financial Concerns: Unable  to Pay Bills  Employment/ Finance Comments: Reports he was recently fired    [employer] Service/Education History  Current or Previous  Service: None  Education Level:  (Did not assess)    Social/Cultural History  Social History: Pt is a 29 year old male, 3 children, single, ABIGAIL, history of homelessness  Cultural Requests During Hospitalization: None  Spiritual Requests During Hospitalization: None  Important Activities: Family    Legal  Legal Considerations: Patient/ Family Ability to Make Healthcare Decisions  Assistance with Managing/Advocating Healthcare Needs:  (Self)  Criminal Activity/ Legal Involvement Pertinent to Current Situation/ Hospitalization: None  Legal Concerns: History of B&E 2016    Drug Screening  Have you used any substances (canabis, cocaine, heroin, hallucinogens, inhalants, etc.) in the past 12 months?: Yes  Have you used any prescription drugs other than prescribed in the past 12 months?: No  Is a toxicology screen needed?: Yes    Stage of Change  Stage of Change: Contemplation  History of Treatment: Inpatient, IOP  Type of Treatment Offered: Inpatient  Treatment Offered: Accepted  Duration of Substance Use: Years  Frequency of Substance Use: When able, generally daily  Age of First Substance Use: Did not assess    Psychosocial  Behaviors/Mood: Calm, Cooperative  Affect: Appropriate to circumstances    Orientation  Orientation Level: Oriented X4    General Appearance  Motor Activity: Unremarkable  Speech Pattern: Rapid  General Attitude: Cooperative, Interested  Appearance/Hygiene: Disheveled, Mutilated hair (Appearing much older than stated age)    Thought Process  Coherency: [place] thinking  Content: Blaming others (Endorsing SI)  Delusions:  (None)  Perception: Not altered  Hallucination: None  Judgment/Insight: Poor  Confusion: None  Cognition: Poor judgement         Risk Factors  Self Harm/Suicidal Ideation Plan: Possible plan to hang himself  Previous Self Harm/Suicidal Plans:  History of attempts, details unknown  Risk Factors: Male, Substance abuse    Violence Risk Assessment  Assessment of Violence: None noted  Thoughts of Harm to Others: No    Ability to Assess Risk Screen  Risk Screen - Ability to Assess: Able to be screened  Ask Suicide-Screening Questions  1. In the past few weeks, have you wished you were dead?: Yes  2. In the past few weeks, have you felt that you or your family would be better off if you were dead?: Yes  3. In the past week, have you been having thoughts about killing yourself?: Yes  4. Have you ever tried to kill yourself?: Yes  How did you try to kill yourself?: Pt would not state  5. Are you having thoughts of killing yourself right now?: Yes  Calculated Risk Score: Imminent Risk  Collin Suicide Severity Rating Scale (Screener/Recent Self-Report)  1. Wish to be Dead (Past 1 Month): Yes  2. Non-Specific Active Suicidal Thoughts (Past 1 Month): Yes  3. Active Suicidal Ideation with any Methods (Not Plan) Without Intent to Act (Past 1 Month): Yes  4. Active Suicidal Ideation with Some Intent to Act, Without Specific Plan (Past 1 Month): Yes  5. Active Suicidal Ideation with Specific Plan and Intent (Past 1 Month): No  6. Suicidal Behavior (Lifetime): Yes  6. Suicidal Behavior (3 Months): No  Calculated C-SSRS Risk Score (Lifetime/Recent): High Risk  Step 1: Risk Factors  Current & Past Psychiatric Dx: Mood disorder, Alcohol/substance abuse disorders  Precipitants/Stressors: Substance intoxication or withdrawal  Change in Treatment: Non-compliant or not receiving treatment  Access to Lethal Methods : No  Step 2: Protective Factors   Protective Factors Internal: Identifies reasons for living  Protective Factors External: Responsibility to children  Step 3: Suicidal Ideation Intensity  How Many Times Have You Had These Thoughts: Daily or almost daily  When You Have the Thoughts How Long do They Last : 1-4 hours/a lot of the time  Could/Can You Stop Thinking About  Killing Yourself or Wanting to Die if You Want to: Unable to control thoughts  Are There Things - Anyone or Anything - That Stopped You From Wanting to Die or Acting on: Deterrents definitely stopped you from attempting suicide  What Sort of Reasons Did You Have For Thinking About Wanting to Die or Killing Yourself: Completely to end or stop the pain (you couldn't go on living with the pain or how you were feeling)  Total Score: 18  Step 5: Documentation  Risk Level: Moderate suicide risk (Pt upgraded to moderate risk. Possible plan for suicide. Help seeking.)    Psychiatric Impression and Plan of Care    Assessment and Plan: Pt is a 29 year old male presenting for psychiatric evaluation with a chief complaint of suicidal ideation and alcohol intoxication. Assessment was conducted via telehealth. On assessment, pt was calm and cooperative. Presenting as mildly intoxicated (some slurring), pt reported readiness to participate in evaluation. Pt reported that he has been feeling suicidal for “years.” He initially denied having a plan for suicide but later in the assessment, he mentioned that he might hang himself. Pt reported that he could do “anything” to kill himself. When asked if he would try to kill himself should he be discharged, pt stated “if it happens it happens. I might do it…” Pt expressed some desire to get help, identifying his 3 children as a reason to get better. Pt reported that he has been an alcoholic for years with little success with prior treatment programs. He struggles with dysthymia at baseline but reported worsening feelings of hopelessness and worthlessness. Pt reported that he sleeps poorly. Medications typically help with sleep but he has been without them for more than a month. Pt denied AH/VH/HI.         Dx: Unspecified mood disorder    Alcohol use disorder         Plan: Pt meets criteria for inpatient psychiatric hospitalization because he represents an elevated risk of harm to  himself.    Specific Resources Provided to Patient: Inpatient  CM Notified: -  PHP/IOP Recommended: --  Specific Information Provided for PHP/IOP: -    Outcome/Disposition  Patient's Perception of Outcome Achieved: Wants admission  Assessment, Recommendations and Risk Level Reviewed with: Dr. Lan  Contact Name: -  Contact Number(s): --  Contact Relationship: -  EPAT Assessment Completed Date: 03/11/24  EPAT Assessment Completed Time: 8007

## 2024-03-11 NOTE — SIGNIFICANT EVENT
Application for Emergency Admission      Ready for Transfer?  Is the patient medically cleared for transfer to inpatient psychiatry: Yes  Has the patient been accepted to an inpatient psychiatric hospital: Yes    Application for Emergency Admission  IN ACCORDANCE WITH [place]122[place]10 [place]  The Chief Clinical Officer of: [facility] 3/11/2024 .4:11 PM    Reason for Hospitalization  The undersigned has reason to believe that: Colin Landa Is a mentally ill person subject to hospitalization by court order under division B Section 5122.01 of the Revised Code, i.e., this person:    1.Yes  Represents a substantial risk of physical harm to self as manifested by evidence of threats of, or attempts at, suicide or serious self-inflicted bodily harm    2.No Represents a substantial risk of physical harm to others as manifested by evidence of recent homicidal or other violent behavior, evidence of recent threats that place another in reasonable fear of violent behavior and serious physical harm, or other evidence of present dangerousness    3.No Represents a substantial and immediate risk of serious physical impairment or injury to self as manifested by  evidence that the person is unable to provide for and is not providing for the person's basic physical needs because of the person's mental illness and that appropriate provision for those needs cannot be made  immediately available in the community    4.No Would benefit from treatment in a hospital for his mental illness and is in need of such treatment as manifested by evidence of behavior that creates a grave and imminent risk to substantial rights of others or  himself.    5.No Would benefit from treatment as manifested by evidence of behavior that indicates all of the following:       (a) The person is unlikely to survive safely in the community without supervision, based on a clinical determination.       (b) The person has a history of lack of compliance with  treatment for mental illness and one of the following applies:      (i) At least twice within the thirty-six months prior to the filing of an affidavit seeking court-ordered treatment of the person under section 5122.111 of the Revised Code, the lack of compliance has been a significant factor in necessitating hospitalization in a hospital or receipt of services in a forensic or other mental health unit of a correctional facility, provided that the thirty-six-month period shall be extended by the length of any hospitalization or incarceration of the person that occurred within the thirty-six-month period.      (ii) Within the forty-eight months prior to the filing of an affidavit seeking court-ordered treatment of the person under section 5122.111 of the Revised Code, the lack of compliance resulted in one or more acts of serious violent behavior toward self or others or threats of, or attempts at, serious physical harm to self or others, provided that the forty-eight-month period shall be extended by the length of any hospitalization or incarceration of the person that occurred within the forty-eight-month period.      (c) The person, as a result of mental illness, is unlikely to voluntarily participate in necessary treatment.       (d) In view of the person's treatment history and current behavior, the person is in need of treatment in order to prevent a relapse or deterioration that would be likely to result in substantial risk of serious harm to the person or others.    (e) Represents a substantial risk of physical harm to self or others if allowed to remain at liberty pending examination.    Therefore, it is requested that said person be admitted to the above named facility.    STATEMENT OF BELIEF    Must be filled out by one of the following: a psychiatrist, licensed physician, licensed clinical psychologist, health or ,  or .  (Statement shall include the circumstances under  which the individual was taken into custody and the reason for the person's belief that hospitalization is necessary. The statement shall also include a reference to efforts made to secure the individual's property at his residence if he was taken into custody there. Every reasonable and appropriate effort should be made to take this person into custody in the least conspicuous manner possible.)    I believe patient requires acute psychiatric evaluation treatment and stabilization for suicidal ideation.    Buck Valerio MD 3/11/2024     _____________________________________________________________   Place of Employment: [facility]    STATEMENT OF OBSERVATION BY PSYCHIATRIST, LICENSED PHYSICIAN, OR LICENSED CLINICAL PSYCHOLOGIST, IF APPLICABLE    Place of Observation (e.g., [facility] mental health center, general hospital, office, emergency facility)  (If applicable, please complete)    Buck Valerio MD 3/11/2024    _____________________________________________________________

## 2024-03-11 NOTE — PROGRESS NOTES
Transition of care note:    ED Course as of 03/11/24 1614   Mon Mar 11, 2024   0732 I received signout patient is pending placement for suicidal ideations [ZS]   1506 Patient accepted to [facility] by  [ZS]   1606 Patient has chronically elevated bilirubin and AST and ALT which are no acutely different today.  He has no right upper quadrant abdominal pain. [ZS]   1607 He was medically cleared by previous attending. [ZS]      ED Course User Index  [ZS] Buck Valerio MD         Diagnoses as of 03/11/24 1614   Suicidal ideation   Alcoholic intoxication without complication (CMS/HCC)      Vitals:    03/11/24 0300   BP: 107/62   Pulse: (!) 102   Resp: 18   Temp:    SpO2: 96%     Results for orders placed or performed during the hospital encounter of 03/10/24 (from the past 24 hour(s))   CBC and Auto Differential   Result Value Ref Range    WBC 6.9 4.4 - 11.3 x10*3/uL    nRBC 0.0 0.0 - 0.0 /100 WBCs    RBC 4.19 (L) 4.50 - 5.90 x10*6/uL    Hemoglobin 14.1 13.5 - 17.5 g/dL    Hematocrit 40.0 (L) 41.0 - 52.0 %    MCV 96 80 - 100 fL    MCH 33.7 26.0 - 34.0 pg    MCHC 35.3 32.0 - 36.0 g/dL    RDW 15.2 (H) 11.5 - 14.5 %    Platelets 122 (L) 150 - 450 x10*3/uL    Neutrophils % 63.0 40.0 - 80.0 %    Immature Granulocytes %, Automated 0.1 0.0 - 0.9 %    Lymphocytes % 27.8 13.0 - 44.0 %    Monocytes % 8.3 2.0 - 10.0 %    Eosinophils % 0.4 0.0 - 6.0 %    Basophils % 0.4 0.0 - 2.0 %    Neutrophils Absolute 4.33 1.20 - 7.70 x10*3/uL    Immature Granulocytes Absolute, Automated 0.01 0.00 - 0.70 x10*3/uL    Lymphocytes Absolute 1.91 1.20 - 4.80 x10*3/uL    Monocytes Absolute 0.57 0.10 - 1.00 x10*3/uL    Eosinophils Absolute 0.03 0.00 - 0.70 x10*3/uL    Basophils Absolute 0.03 0.00 - 0.10 x10*3/uL   Comprehensive Metabolic Panel   Result Value Ref Range    Glucose 120 (H) 74 - 99 mg/dL    Sodium 138 136 - 145 mmol/L    Potassium 3.4 (L) 3.5 - 5.3 mmol/L    Chloride 102 98 - 107 mmol/L    Bicarbonate 25 21 - 32 mmol/L     Anion Gap 14 10 - 20 mmol/L    Urea Nitrogen 10 6 - 23 mg/dL    Creatinine 0.80 0.50 - 1.30 mg/dL    eGFR >90 >60 mL/min/1.73m*2    Calcium 8.4 (L) 8.6 - 10.3 mg/dL    Albumin 3.9 3.4 - 5.0 g/dL    Alkaline Phosphatase 97 33 - 120 U/L    Total Protein 7.4 6.4 - 8.2 g/dL    AST 84 (H) 9 - 39 U/L    Bilirubin, Total 2.3 (H) 0.0 - 1.2 mg/dL    ALT 69 (H) 10 - 52 U/L   Acute Toxicology Panel, Blood   Result Value Ref Range    Acetaminophen <10.0 10.0 - 30.0 ug/mL    Salicylate  <3 4 - 20 mg/dL    Alcohol 314 (H) <=10 mg/dL   Ethanol   Result Value Ref Range    Alcohol 168 (H) <=10 mg/dL      Procedures

## 2024-03-12 ENCOUNTER — APPOINTMENT (OUTPATIENT)
Dept: CARDIOLOGY | Facility: HOSPITAL | Age: 29
End: 2024-03-12
Payer: MEDICAID

## 2024-03-12 ENCOUNTER — APPOINTMENT (OUTPATIENT)
Dept: RADIOLOGY | Facility: HOSPITAL | Age: 29
End: 2024-03-12
Payer: MEDICAID

## 2024-03-12 LAB
25(OH)D3 SERPL-MCNC: 22 NG/ML (ref 30–100)
ALBUMIN SERPL BCP-MCNC: 3.4 G/DL (ref 3.4–5)
ALP SERPL-CCNC: 109 U/L (ref 33–120)
ALT SERPL W P-5'-P-CCNC: 56 U/L (ref 10–52)
ANION GAP SERPL CALC-SCNC: 13 MMOL/L (ref 10–20)
APPEARANCE UR: CLEAR
AST SERPL W P-5'-P-CCNC: 63 U/L (ref 9–39)
BILIRUB DIRECT SERPL-MCNC: 0.9 MG/DL (ref 0–0.3)
BILIRUB SERPL-MCNC: 3.6 MG/DL (ref 0–1.2)
BILIRUB UR STRIP.AUTO-MCNC: NEGATIVE MG/DL
BUN SERPL-MCNC: 16 MG/DL (ref 6–23)
CALCIUM SERPL-MCNC: 8.8 MG/DL (ref 8.6–10.3)
CHLORIDE SERPL-SCNC: 104 MMOL/L (ref 98–107)
CHOLEST SERPL-MCNC: 172 MG/DL (ref 0–199)
CHOLESTEROL/HDL RATIO: 3.3
CO2 SERPL-SCNC: 24 MMOL/L (ref 21–32)
COLOR UR: ABNORMAL
CREAT SERPL-MCNC: 0.86 MG/DL (ref 0.5–1.3)
EGFRCR SERPLBLD CKD-EPI 2021: >90 ML/MIN/1.73M*2
FOLATE SERPL-MCNC: 8.8 NG/ML
GLUCOSE P FAST SERPL-MCNC: 112 MG/DL (ref 74–99)
GLUCOSE SERPL-MCNC: 114 MG/DL (ref 74–99)
GLUCOSE UR STRIP.AUTO-MCNC: NEGATIVE MG/DL
HDLC SERPL-MCNC: 52.5 MG/DL
HOLD SPECIMEN: NORMAL
KETONES UR STRIP.AUTO-MCNC: NEGATIVE MG/DL
LDLC SERPL CALC-MCNC: 109 MG/DL
LEUKOCYTE ESTERASE UR QL STRIP.AUTO: NEGATIVE
MUCOUS THREADS #/AREA URNS AUTO: ABNORMAL /LPF
NITRITE UR QL STRIP.AUTO: NEGATIVE
NON HDL CHOLESTEROL: 120 MG/DL (ref 0–149)
PH UR STRIP.AUTO: 5 [PH]
POTASSIUM SERPL-SCNC: 3.6 MMOL/L (ref 3.5–5.3)
PROT SERPL-MCNC: 6.7 G/DL (ref 6.4–8.2)
PROT UR STRIP.AUTO-MCNC: ABNORMAL MG/DL
RBC # UR STRIP.AUTO: ABNORMAL /UL
RBC #/AREA URNS AUTO: >20 /HPF
SODIUM SERPL-SCNC: 137 MMOL/L (ref 136–145)
SP GR UR STRIP.AUTO: 1.03
TESTOST SERPL-MCNC: 999 NG/DL (ref 240–1000)
TRIGL SERPL-MCNC: 53 MG/DL (ref 0–149)
TSH SERPL-ACNC: 1.39 MIU/L (ref 0.44–3.98)
UROBILINOGEN UR STRIP.AUTO-MCNC: 4 MG/DL
VIT B12 SERPL-MCNC: 427 PG/ML (ref 211–911)
VLDL: 11 MG/DL (ref 0–40)
WBC #/AREA URNS AUTO: ABNORMAL /HPF

## 2024-03-12 PROCEDURE — 81001 URINALYSIS AUTO W/SCOPE: CPT | Performed by: REGISTERED NURSE

## 2024-03-12 PROCEDURE — 82248 BILIRUBIN DIRECT: CPT | Performed by: REGISTERED NURSE

## 2024-03-12 PROCEDURE — 2500000001 HC RX 250 WO HCPCS SELF ADMINISTERED DRUGS (ALT 637 FOR MEDICARE OP): Performed by: REGISTERED NURSE

## 2024-03-12 PROCEDURE — 84075 ASSAY ALKALINE PHOSPHATASE: CPT | Performed by: REGISTERED NURSE

## 2024-03-12 PROCEDURE — 97165 OT EVAL LOW COMPLEX 30 MIN: CPT | Mod: GO

## 2024-03-12 PROCEDURE — 71045 X-RAY EXAM CHEST 1 VIEW: CPT

## 2024-03-12 PROCEDURE — 82746 ASSAY OF FOLIC ACID SERUM: CPT | Performed by: SPECIALIST

## 2024-03-12 PROCEDURE — 82947 ASSAY GLUCOSE BLOOD QUANT: CPT | Performed by: REGISTERED NURSE

## 2024-03-12 PROCEDURE — 84403 ASSAY OF TOTAL TESTOSTERONE: CPT | Mod: ELYLAB | Performed by: PSYCHIATRY & NEUROLOGY

## 2024-03-12 PROCEDURE — 99222 1ST HOSP IP/OBS MODERATE 55: CPT | Performed by: PSYCHIATRY & NEUROLOGY

## 2024-03-12 PROCEDURE — 93005 ELECTROCARDIOGRAM TRACING: CPT

## 2024-03-12 PROCEDURE — 2500000001 HC RX 250 WO HCPCS SELF ADMINISTERED DRUGS (ALT 637 FOR MEDICARE OP): Performed by: PSYCHIATRY & NEUROLOGY

## 2024-03-12 PROCEDURE — 99223 1ST HOSP IP/OBS HIGH 75: CPT | Performed by: REGISTERED NURSE

## 2024-03-12 PROCEDURE — 2500000005 HC RX 250 GENERAL PHARMACY W/O HCPCS: Performed by: REGISTERED NURSE

## 2024-03-12 PROCEDURE — 82607 VITAMIN B-12: CPT | Performed by: SPECIALIST

## 2024-03-12 PROCEDURE — 1240000001 HC SEMI-PRIVATE BH ROOM DAILY

## 2024-03-12 PROCEDURE — 93010 ELECTROCARDIOGRAM REPORT: CPT | Performed by: INTERNAL MEDICINE

## 2024-03-12 PROCEDURE — 84443 ASSAY THYROID STIM HORMONE: CPT | Performed by: SPECIALIST

## 2024-03-12 PROCEDURE — 82306 VITAMIN D 25 HYDROXY: CPT | Performed by: SPECIALIST

## 2024-03-12 PROCEDURE — 81003 URINALYSIS AUTO W/O SCOPE: CPT | Performed by: REGISTERED NURSE

## 2024-03-12 PROCEDURE — 71045 X-RAY EXAM CHEST 1 VIEW: CPT | Performed by: RADIOLOGY

## 2024-03-12 PROCEDURE — 80061 LIPID PANEL: CPT | Performed by: REGISTERED NURSE

## 2024-03-12 PROCEDURE — 36415 COLL VENOUS BLD VENIPUNCTURE: CPT | Performed by: PSYCHIATRY & NEUROLOGY

## 2024-03-12 RX ORDER — ARIPIPRAZOLE 5 MG/1
5 TABLET ORAL DAILY
Status: DISCONTINUED | OUTPATIENT
Start: 2024-03-12 | End: 2024-03-13

## 2024-03-12 RX ORDER — LIDOCAINE 560 MG/1
1 PATCH PERCUTANEOUS; TOPICAL; TRANSDERMAL DAILY
Status: DISCONTINUED | OUTPATIENT
Start: 2024-03-12 | End: 2024-03-15 | Stop reason: HOSPADM

## 2024-03-12 RX ADMIN — LORAZEPAM 0.5 MG: 0.5 TABLET ORAL at 18:12

## 2024-03-12 RX ADMIN — FOLIC ACID 1 MG: 1 TABLET ORAL at 08:47

## 2024-03-12 RX ADMIN — IBUPROFEN 600 MG: 600 TABLET, FILM COATED ORAL at 07:06

## 2024-03-12 RX ADMIN — Medication 1 TABLET: at 08:47

## 2024-03-12 RX ADMIN — TRAZODONE HYDROCHLORIDE 50 MG: 50 TABLET ORAL at 21:07

## 2024-03-12 RX ADMIN — IBUPROFEN 600 MG: 600 TABLET, FILM COATED ORAL at 21:06

## 2024-03-12 RX ADMIN — ARIPIPRAZOLE 5 MG: 5 TABLET ORAL at 08:47

## 2024-03-12 RX ADMIN — HYDROXYZINE HYDROCHLORIDE 50 MG: 25 TABLET ORAL at 15:33

## 2024-03-12 RX ADMIN — LIDOCAINE 1 PATCH: 4 PATCH TOPICAL at 17:22

## 2024-03-12 RX ADMIN — Medication 100 MG: at 08:47

## 2024-03-12 RX ADMIN — HYDROXYZINE HYDROCHLORIDE 50 MG: 25 TABLET ORAL at 07:02

## 2024-03-12 RX ADMIN — HYDROXYZINE HYDROCHLORIDE 50 MG: 25 TABLET ORAL at 21:26

## 2024-03-12 SDOH — HEALTH STABILITY: MENTAL HEALTH: DEPRESSION SYMPTOMS: ISOLATIVE;APPETITE CHANGE;INCREASED IRRITABILITY

## 2024-03-12 SDOH — HEALTH STABILITY: MENTAL HEALTH

## 2024-03-12 SDOH — HEALTH STABILITY: MENTAL HEALTH: ANXIETY SYMPTOMS: GENERALIZED

## 2024-03-12 SDOH — ECONOMIC STABILITY: HOUSING INSECURITY

## 2024-03-12 ASSESSMENT — LIFESTYLE VARIABLES
VISUAL DISTURBANCES: NOT PRESENT
VISUAL DISTURBANCES: NOT PRESENT
TREMOR: NO TREMOR
PAROXYSMAL SWEATS: NO SWEAT VISIBLE
TOTAL SCORE: 0
BLOOD PRESSURE: 111/56
NAUSEA AND VOMITING: NO NAUSEA AND NO VOMITING
TREMOR: NOT VISIBLE, BUT CAN BE FELT FINGERTIP TO FINGERTIP
VISUAL DISTURBANCES: NOT PRESENT
NAUSEA AND VOMITING: NO NAUSEA AND NO VOMITING
ORIENTATION AND CLOUDING OF SENSORIUM: ORIENTED AND CAN DO SERIAL ADDITIONS
HEADACHE, FULLNESS IN HEAD: NOT PRESENT
VISUAL DISTURBANCES: NOT PRESENT
ORIENTATION AND CLOUDING OF SENSORIUM: ORIENTED AND CAN DO SERIAL ADDITIONS
NAUSEA AND VOMITING: NO NAUSEA AND NO VOMITING
ORIENTATION AND CLOUDING OF SENSORIUM: ORIENTED AND CAN DO SERIAL ADDITIONS
PAROXYSMAL SWEATS: NO SWEAT VISIBLE
TOTAL SCORE: 0
TREMOR: NO TREMOR
TREMOR: NO TREMOR
ANXIETY: NO ANXIETY, AT EASE
BLOOD PRESSURE: 100/52
HEADACHE, FULLNESS IN HEAD: NOT PRESENT
AGITATION: NORMAL ACTIVITY
TREMOR: NO TREMOR
PULSE: 84
BLOOD PRESSURE: 112/56
AGITATION: NORMAL ACTIVITY
HEADACHE, FULLNESS IN HEAD: NOT PRESENT
AGITATION: NORMAL ACTIVITY
PAROXYSMAL SWEATS: NO SWEAT VISIBLE
ORIENTATION AND CLOUDING OF SENSORIUM: ORIENTED AND CAN DO SERIAL ADDITIONS
TOTAL SCORE: 0
TREMOR: NO TREMOR
SUBSTANCE_ABUSE_PAST_12_MONTHS: YES
ANXIETY: NO ANXIETY, AT EASE
AGITATION: NORMAL ACTIVITY
AGITATION: NORMAL ACTIVITY
HEADACHE, FULLNESS IN HEAD: NOT PRESENT
AGITATION: NORMAL ACTIVITY
PAROXYSMAL SWEATS: NO SWEAT VISIBLE
AUDITORY DISTURBANCES: NOT PRESENT
ORIENTATION AND CLOUDING OF SENSORIUM: ORIENTED AND CAN DO SERIAL ADDITIONS
PULSE: 84
TOTAL SCORE: 3
NAUSEA AND VOMITING: NO NAUSEA AND NO VOMITING
TREMOR: NO TREMOR
TOTAL SCORE: 1
VISUAL DISTURBANCES: NOT PRESENT
VISUAL DISTURBANCES: NOT PRESENT
AUDITORY DISTURBANCES: NOT PRESENT
ORIENTATION AND CLOUDING OF SENSORIUM: ORIENTED AND CAN DO SERIAL ADDITIONS
ANXIETY: NO ANXIETY, AT EASE
PAROXYSMAL SWEATS: NO SWEAT VISIBLE
AUDITORY DISTURBANCES: NOT PRESENT
NAUSEA AND VOMITING: NO NAUSEA AND NO VOMITING
TOTAL SCORE: 3
TREMOR: NO TREMOR
HEADACHE, FULLNESS IN HEAD: NOT PRESENT
VISUAL DISTURBANCES: NOT PRESENT
TOTAL SCORE: 0
ORIENTATION AND CLOUDING OF SENSORIUM: ORIENTED AND CAN DO SERIAL ADDITIONS
ANXIETY: 3
BLOOD PRESSURE: 113/64
AUDITORY DISTURBANCES: NOT PRESENT
PAROXYSMAL SWEATS: NO SWEAT VISIBLE
NAUSEA AND VOMITING: NO NAUSEA AND NO VOMITING
PULSE: 55
VISUAL DISTURBANCES: NOT PRESENT
AUDITORY DISTURBANCES: NOT PRESENT
AUDITORY DISTURBANCES: NOT PRESENT
PULSE: 71
ANXIETY: NO ANXIETY, AT EASE
VISUAL DISTURBANCES: NOT PRESENT
HEADACHE, FULLNESS IN HEAD: NOT PRESENT
ANXIETY: NO ANXIETY, AT EASE
HEADACHE, FULLNESS IN HEAD: NOT PRESENT
ANXIETY: NO ANXIETY, AT EASE
TOTAL SCORE: 1
AGITATION: NORMAL ACTIVITY
AGITATION: NORMAL ACTIVITY
PULSE: 75
PAROXYSMAL SWEATS: NO SWEAT VISIBLE
PULSE: 54
AUDITORY DISTURBANCES: NOT PRESENT
TREMOR: NOT VISIBLE, BUT CAN BE FELT FINGERTIP TO FINGERTIP
BLOOD PRESSURE: 121/70
PAROXYSMAL SWEATS: NO SWEAT VISIBLE
TOTAL SCORE: 0
AUDITORY DISTURBANCES: NOT PRESENT
PULSE: 61
TOTAL SCORE: 1
ORIENTATION AND CLOUDING OF SENSORIUM: ORIENTED AND CAN DO SERIAL ADDITIONS
ANXIETY: MILDLY ANXIOUS
PAROXYSMAL SWEATS: NO SWEAT VISIBLE
PAROXYSMAL SWEATS: NO SWEAT VISIBLE
PULSE: 74
AUDITORY DISTURBANCES: NOT PRESENT
PULSE: 55
PULSE: 53
AUDITORY DISTURBANCES: NOT PRESENT
HEADACHE, FULLNESS IN HEAD: NOT PRESENT
BLOOD PRESSURE: 130/75
AGITATION: NORMAL ACTIVITY
ORIENTATION AND CLOUDING OF SENSORIUM: ORIENTED AND CAN DO SERIAL ADDITIONS
BLOOD PRESSURE: 110/66
TOTAL SCORE: 6
BLOOD PRESSURE: 118/79
AGITATION: NORMAL ACTIVITY
NAUSEA AND VOMITING: NO NAUSEA AND NO VOMITING
TREMOR: NO TREMOR
ANXIETY: MILDLY ANXIOUS
ANXIETY: 3
TREMOR: NO TREMOR
VISUAL DISTURBANCES: NOT PRESENT
ANXIETY: 5
NAUSEA AND VOMITING: NO NAUSEA AND NO VOMITING
NAUSEA AND VOMITING: NO NAUSEA AND NO VOMITING
VISUAL DISTURBANCES: NOT PRESENT
HEADACHE, FULLNESS IN HEAD: NOT PRESENT
NAUSEA AND VOMITING: NO NAUSEA AND NO VOMITING
ORIENTATION AND CLOUDING OF SENSORIUM: ORIENTED AND CAN DO SERIAL ADDITIONS
PAROXYSMAL SWEATS: NO SWEAT VISIBLE
BLOOD PRESSURE: 122/75
NAUSEA AND VOMITING: NO NAUSEA AND NO VOMITING
ORIENTATION AND CLOUDING OF SENSORIUM: ORIENTED AND CAN DO SERIAL ADDITIONS
BLOOD PRESSURE: 132/91
AGITATION: NORMAL ACTIVITY
HEADACHE, FULLNESS IN HEAD: NOT PRESENT
HEADACHE, FULLNESS IN HEAD: NOT PRESENT
AUDITORY DISTURBANCES: NOT PRESENT

## 2024-03-12 ASSESSMENT — PAIN DESCRIPTION - LOCATION
LOCATION: ABDOMEN
LOCATION: ABDOMEN

## 2024-03-12 ASSESSMENT — COLUMBIA-SUICIDE SEVERITY RATING SCALE - C-SSRS
2. HAVE YOU ACTUALLY HAD ANY THOUGHTS OF KILLING YOURSELF?: NO
6. HAVE YOU EVER DONE ANYTHING, STARTED TO DO ANYTHING, OR PREPARED TO DO ANYTHING TO END YOUR LIFE?: NO
1. SINCE LAST CONTACT, HAVE YOU WISHED YOU WERE DEAD OR WISHED YOU COULD GO TO SLEEP AND NOT WAKE UP?: NO

## 2024-03-12 ASSESSMENT — PAIN SCALES - GENERAL
PAINLEVEL_OUTOF10: 5 - MODERATE PAIN
PAINLEVEL_OUTOF10: 7
PAINLEVEL_OUTOF10: 3
PAINLEVEL_OUTOF10: 3

## 2024-03-12 ASSESSMENT — PAIN DESCRIPTION - ORIENTATION: ORIENTATION: LEFT

## 2024-03-12 ASSESSMENT — PAIN - FUNCTIONAL ASSESSMENT
PAIN_FUNCTIONAL_ASSESSMENT: 0-10
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
## 2024-03-12 NOTE — H&P
History Of Present Illness  Colin Landa is a 29 y.o. year old male patient with bipolar disorder, ASA, and alcohol use disorder who presented to the ED with suicidal ideation.  Patient reported multiple stressors including recent loss of his, break-up with his girlfriend.  Patient jessicas he has 3 children from different mothers.  He states that he does not get to see them as he would like to due to difficulties in the relationships with their mothers.  Patient jessicas he was living with his ex-girlfriend, but she kicked him out.  Patient reports he has been struggling with alcohol use, states he is interested in going to a [facility] house so that he continue to work but also work on recovery.  Patient reports that he has not been compliant with psychiatric medications.     Past Medical History  History reviewed. No pertinent past medical history.    Past Psychiatric History:   Previous therapy: no  Previous psychiatric treatment and medication trials: yes - Seroquel, Minipress, Anaprox  Previous psychiatric hospitalizations: yes - [facility] documented [facility] Other [facility] to [facility]  Previous diagnoses: yes - bipolar disorder, ASA, AUD  Previous suicide attempts: no  History of violence: no      Allergies  Allergies   Allergen Reactions    Tramadol Seizure        MSE  General: Appropriately groomed and dressed.  Appearance: Appears stated age.  Attitude: Calm, cooperative.  Behavior: Appropriate eye contact.  Motor activity: No agitation or retardation. no EPS.  Normal gait.  Speech: Regular rate, rhythm, volume and tone.  Mood: Depressed  Affect: Flat  Thought process: Organized, linear, goal-directed.  Associations are logical.  Thought content: Does not endorse suicidal or homicidal ideation, no delusions elicited.  Thought perception: Did not endorse auditory or visual hallucinations.  Cognition: Alert, oriented x3.  no deficit in memory or attention.  Insight: Fair.  Judgment: Fair.    Psychiatric Risk  Assessment  Violence Risk Assessment: major mental illness, male, and substance abuse  Acute Risk of Harm to Others is Considered: low   Suicide Risk Assessment: , current psychiatric illness, male, and suicidal ideations  Protective Factors against Suicide: child-related concerns/living with children at home < 18 yrs, employment, hopefulness/future orientation, and positive family relationships  Acute Risk of Harm to Self is Considered: moderate    Last Recorded Vitals  Vitals:    03/12/24 1100   BP: 112/56   Pulse: 54   Resp:    Temp:    SpO2:         Relevant Results  Scheduled medications  ARIPiprazole, 5 mg, oral, Daily  folic acid, 1 mg, oral, Daily  multivitamin with minerals, 1 tablet, oral, Daily  nicotine, 1 patch, transdermal, Daily  thiamine, 100 mg, oral, Daily      Continuous medications     PRN medications  PRN medications: alum-mag hydroxide-simeth, diphenhydrAMINE **OR** diphenhydrAMINE, hydrOXYzine HCL, ibuprofen, LORazepam **OR** LORazepam **OR** LORazepam, magnesium hydroxide, traZODone     Results for orders placed or performed during the hospital encounter of 03/11/24 (from the past 96 hour(s))   Glucose, Fasting   Result Value Ref Range    Glucose, Fasting 112 (H) 74 - 99 mg/dL   Lipid Panel   Result Value Ref Range    Cholesterol 172 0 - 199 mg/dL    HDL-Cholesterol 52.5 mg/dL    Cholesterol/HDL Ratio 3.3     LDL Calculated 109 (H) <=99 mg/dL    VLDL 11 0 - 40 mg/dL    Triglycerides 53 0 - 149 mg/dL    Non HDL Cholesterol 120 0 - 149 mg/dL   Lavender Top   Result Value Ref Range    Extra Tube Hold for add-ons.          Assessment/Plan   Principal Problem:  Bipolar depression, AUD    Patient presented to the ED with suicidal ideation in the setting of multiple stressors.  Patient reports he has been struggling with alcohol use and has not been compliant with psychiatric medications.  Patient agreeable to start Abilify 5 mg oral daily.  Patient interested in sober living once  discharged from the hospital.    Impression:     Labs and Chart: reviewed   Case discussed with treatment team members  Encouraged patient to attend group and other mileu activity  Collateral from family - pending  Discharge planing  Medication:       - Reviewed. To continue as ordered    Medication Consent  Medication Consent: risks, benefits, side effects reviewed for all ordered meds and patient expressed understanding and consent obtained    JANA Santa-CNP
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
## 2024-03-12 NOTE — DISCHARGE INSTR - APPOINTMENTS
Recovery Resources  Walk in times Monday-Friday 8am-2pm  [address] [place], [place] [address] [phone]  Please bring Insurance Card, Photo ID and Hospital Discharge Paperwork
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
## 2024-03-12 NOTE — CARE PLAN
Problem: Potential for Harm to Self or Others  Goal: Participates in unit activities  Outcome: Progressing     Problem: Potential for Harm to Self or Others  Goal: Understands least restrictive measures  Outcome: Progressing   The patient's goals for the shift include will feel better    The clinical goals for the shift include mo med side effects and    Over the shift, the patient did not make progress toward the following goals of going to groups, reorganizing problem with etoh and need for inpt tx. Barriers to progression include just getting to unit.  Recommendations to address these barriers include continued hospitalization and med review.       Pt is not showing any signs of withdrawal. Pt has poor insight and does have pressured speech at times. Pt talked to this nurse at length about wanting to return to friends in [place] stating that they are sober support and he feels it is better area for him. Pt states bp and pulse low till 6pm ciwa. Pt in bed most of day with only complaint being anxiety. Pt says no blood in stool. Consult called into neuro and GI. Pt up to groups and meals this evening, social, laughing. Pt now to [facility] walker group. Pt denies any suicidal thoughts, denies any homicidal thoughts, denies any auditory or visual hallucinations. Awaiting xray of rib results, urine sent for culture.

## 2024-03-12 NOTE — PROGRESS NOTES
"Occupational Therapy    Behavioral Health Evaluation    Patient Name: Colin Landa  MRN: [id number]  Today's Date: 3/12/2024   Assessment      Subjective   Depression, S.I.    General:  General  Reason for Referral: Impaired IADLs  Referred By: Dr. James MD  Past Medical History Relevant to Rehab: Pt to the ED w/S.I. after Pt's then Girl Friend(Thalia) evicted Pt during an arguement on [place] 3/10/24. 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 C.D. issues & noncompliance w/Mental health treatment; h/o Bipolar Dx, C.D. issues(ETOH)  Prior to Session Communication: Charge Nurse  Preferred Learning Style: auditory, verbal, visual, written  General Comment: Pt set a goal to go to a [facility] house to work on sobriety & gain recovery.  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, worker     Sources of Support:    An ex Girl friend \"Allison\" willingly picked up Pt from Ex GF(Patience's) home near [place] and brought Pt to [facility]'s ED on [place] 3/10/24    Prior Level of Function/Living Situation:    Activities of Daily Living/Self Care  Living Situation: homeless  Hygiene/Grooming: independence  Bathing: independence  Dressing: independence  Toileting: independence  Continence: independence  Feeding: independence  Functional Mobility: independence  Medication Use/Follows Medical Advice: noncompliant  ADL Comment: Pt admitted to noncompliance w/mental health treatment due to Pt's fluctuating transportation issues & increased depression & grief re: no contact w/Pt's 3 kids.     Instrumental Activities of Daily Living  Parenting/: impaired Pt tearful in telling that he has no connection with his 13yo, " "8yo and 8yo daughters due to conflicts with their moms, per pt.  Financial Management: impaired  Physical Self-care : WFL  Emotional Self-care: impaired  Home Care/Chores: impaired  Cooking: impaired  Safety Judgement: impaired  Rest/Sleep: impaired  Current License: No  Mode of Transportation: Walk, Friends  Education: 10th grade then Pt stepped away from  to work in supporting the birth of his 1st child. Pt tried unsuccessfuly to get his GED & would eventually work towards achieving his GED.  Occupation: Unemployed  Leisure and Hobbies: TV, Space Monkeye games, being outside and trying to see his kids.  IADL Comments: Pt admitted his severe depression impeded his energy/motivation for helping out w/chores & errands causing his now Ex GF to argue.    Social Participation/Community Involvement:  Socializing: Pt willingly came out of his room this afternoon to socialize with others in a superficial manner.  Balanced Relationships:  Chronic Strained relations w/Pt's biological mom since Pt's dad  in  causing Pt's mom to develop a C.D. issue & severe depression, \"She ran off and did her thing forgetting that she had kids,'' per Pt.    Emotional Regulation Skills:  Emotional Expression:  Affect: constricted  Speech: regular rhythm, rate, volume, & tone  Mood/Impulse Modulation: poor anxiety management, poor depression management, and poor grief management  Coping Skills:  Helpful: \"...taking a walk outside to chill,\" per Pt.  Harmful: substance abuse and suicidal or homicidal ideation    Cognitive & Task Performance Skills:  Orientation: person, place, time, and situation  Attention Span: sustained  Problem-solving: [facility]  Decision-making: [facility]  Thought Content: [facility]  Thought Processes: coherent  Organizational Skills: thought processes are linear and organized  Short Term Memory: WFL  Remote Memory: [facility]  Safety Judgement: impaired  Perseveration:  not present  Insight/Judgement:  impaired    Communication and " "Interpersonal Skills:  Boundaries: WFL  Appropriate Disclosure: Pt noted passiveness, \"I am co-dependent too.\"  Assertion: Pt's passive aggressiveness impede effective interpersonal communication/relationships.            Occupational Therapy Intervention Plan:  OT Interventions: Therapeutic use of self/activities, OT Task Skills Group/1:1, OT Life Management Skills Group/1:1, Grief/Anger Management Group/1:1, ADL/IADL Group/1:1   Goals:   Encounter Problems       Encounter Problems (Active)       OT Goals       OT Goal 1       Start:  03/12/24    Expected End:  04/09/24       Communication/Interaction Skills (Self-expression/social Behavior/assertive)  Explore/demonstrate 1-2 effective methods of expressing feelings/wants/needs (can include assertion/engaging/sharing/asking) prior to discharge          OT Goal 2       Start:  03/12/24    Expected End:  04/09/24       Coping Skills  Explore/identify 1-2 effective strategies of expressing feelings, wants, needs(can include assertion, engaging, sharing, asking) prior to discharge          OT Goal 3       Start:  03/12/24    Expected End:  04/09/24        Emotion Regulation/self control  Pt will exhibit appropriate range, regulation of emotions, impulse control, frustration tolerance in OT groups prior to discharge.                   "

## 2024-03-13 ENCOUNTER — APPOINTMENT (OUTPATIENT)
Dept: NEUROLOGY | Facility: HOSPITAL | Age: 29
End: 2024-03-13
Payer: MEDICAID

## 2024-03-13 LAB
AMMONIA PLAS-SCNC: 71 UMOL/L (ref 16–53)
HOLD SPECIMEN: NORMAL
INR PPP: 1.5 (ref 0.9–1.1)
PROTHROMBIN TIME: 16.7 SECONDS (ref 9.8–12.8)

## 2024-03-13 PROCEDURE — 36415 COLL VENOUS BLD VENIPUNCTURE: CPT | Performed by: SPECIALIST

## 2024-03-13 PROCEDURE — 2500000001 HC RX 250 WO HCPCS SELF ADMINISTERED DRUGS (ALT 637 FOR MEDICARE OP): Performed by: NURSE PRACTITIONER

## 2024-03-13 PROCEDURE — 1240000001 HC SEMI-PRIVATE BH ROOM DAILY

## 2024-03-13 PROCEDURE — 36415 COLL VENOUS BLD VENIPUNCTURE: CPT | Performed by: NURSE PRACTITIONER

## 2024-03-13 PROCEDURE — 2500000002 HC RX 250 W HCPCS SELF ADMINISTERED DRUGS (ALT 637 FOR MEDICARE OP, ALT 636 FOR OP/ED): Performed by: NURSE PRACTITIONER

## 2024-03-13 PROCEDURE — 2500000001 HC RX 250 WO HCPCS SELF ADMINISTERED DRUGS (ALT 637 FOR MEDICARE OP): Performed by: REGISTERED NURSE

## 2024-03-13 PROCEDURE — 82140 ASSAY OF AMMONIA: CPT | Performed by: SPECIALIST

## 2024-03-13 PROCEDURE — 2500000001 HC RX 250 WO HCPCS SELF ADMINISTERED DRUGS (ALT 637 FOR MEDICARE OP): Performed by: PSYCHIATRY & NEUROLOGY

## 2024-03-13 PROCEDURE — 85610 PROTHROMBIN TIME: CPT | Performed by: NURSE PRACTITIONER

## 2024-03-13 PROCEDURE — 99232 SBSQ HOSP IP/OBS MODERATE 35: CPT | Performed by: PSYCHIATRY & NEUROLOGY

## 2024-03-13 PROCEDURE — 2500000005 HC RX 250 GENERAL PHARMACY W/O HCPCS: Performed by: REGISTERED NURSE

## 2024-03-13 PROCEDURE — 97150 GROUP THERAPEUTIC PROCEDURES: CPT | Mod: GO

## 2024-03-13 RX ORDER — SPIRONOLACTONE 25 MG/1
50 TABLET ORAL 2 TIMES DAILY
Status: DISCONTINUED | OUTPATIENT
Start: 2024-03-13 | End: 2024-03-15 | Stop reason: HOSPADM

## 2024-03-13 RX ORDER — ARIPIPRAZOLE 10 MG/1
10 TABLET ORAL DAILY
Status: DISCONTINUED | OUTPATIENT
Start: 2024-03-14 | End: 2024-03-15 | Stop reason: HOSPADM

## 2024-03-13 RX ORDER — FUROSEMIDE 40 MG/1
40 TABLET ORAL DAILY
Status: DISCONTINUED | OUTPATIENT
Start: 2024-03-13 | End: 2024-03-15 | Stop reason: HOSPADM

## 2024-03-13 RX ORDER — CIPROFLOXACIN 250 MG/1
500 TABLET, FILM COATED ORAL DAILY
Status: DISCONTINUED | OUTPATIENT
Start: 2024-03-13 | End: 2024-03-15 | Stop reason: HOSPADM

## 2024-03-13 RX ORDER — CHOLECALCIFEROL (VITAMIN D3) 25 MCG
1000 TABLET ORAL DAILY
Status: DISCONTINUED | OUTPATIENT
Start: 2024-03-14 | End: 2024-03-15 | Stop reason: HOSPADM

## 2024-03-13 RX ADMIN — HYDROXYZINE HYDROCHLORIDE 50 MG: 25 TABLET ORAL at 21:23

## 2024-03-13 RX ADMIN — FUROSEMIDE 40 MG: 40 TABLET ORAL at 18:51

## 2024-03-13 RX ADMIN — Medication 1 TABLET: at 08:36

## 2024-03-13 RX ADMIN — SPIRONOLACTONE 50 MG: 25 TABLET ORAL at 20:45

## 2024-03-13 RX ADMIN — CIPROFLOXACIN 500 MG: 250 TABLET, COATED ORAL at 18:51

## 2024-03-13 RX ADMIN — LIDOCAINE 1 PATCH: 4 PATCH TOPICAL at 08:36

## 2024-03-13 RX ADMIN — FOLIC ACID 1 MG: 1 TABLET ORAL at 08:36

## 2024-03-13 RX ADMIN — Medication 100 MG: at 08:36

## 2024-03-13 RX ADMIN — IBUPROFEN 600 MG: 600 TABLET, FILM COATED ORAL at 20:45

## 2024-03-13 RX ADMIN — TRAZODONE HYDROCHLORIDE 50 MG: 50 TABLET ORAL at 20:45

## 2024-03-13 RX ADMIN — ARIPIPRAZOLE 5 MG: 5 TABLET ORAL at 08:36

## 2024-03-13 ASSESSMENT — LIFESTYLE VARIABLES
ORIENTATION AND CLOUDING OF SENSORIUM: ORIENTED AND CAN DO SERIAL ADDITIONS
HEADACHE, FULLNESS IN HEAD: NOT PRESENT
TOTAL SCORE: 0
PULSE: 60
NAUSEA AND VOMITING: NO NAUSEA AND NO VOMITING
AUDITORY DISTURBANCES: NOT PRESENT
TREMOR: NO TREMOR
AUDITORY DISTURBANCES: NOT PRESENT
PAROXYSMAL SWEATS: NO SWEAT VISIBLE
ANXIETY: NO ANXIETY, AT EASE
ANXIETY: NO ANXIETY, AT EASE
NAUSEA AND VOMITING: NO NAUSEA AND NO VOMITING
NAUSEA AND VOMITING: NO NAUSEA AND NO VOMITING
TOTAL SCORE: 0
BLOOD PRESSURE: 110/82
AUDITORY DISTURBANCES: NOT PRESENT
TREMOR: NO TREMOR
ORIENTATION AND CLOUDING OF SENSORIUM: ORIENTED AND CAN DO SERIAL ADDITIONS
AGITATION: NORMAL ACTIVITY
HEADACHE, FULLNESS IN HEAD: NOT PRESENT
PAROXYSMAL SWEATS: NO SWEAT VISIBLE
VISUAL DISTURBANCES: NOT PRESENT
VISUAL DISTURBANCES: NOT PRESENT
ANXIETY: NO ANXIETY, AT EASE
AGITATION: NORMAL ACTIVITY
VISUAL DISTURBANCES: NOT PRESENT
HEADACHE, FULLNESS IN HEAD: NOT PRESENT
PAROXYSMAL SWEATS: NO SWEAT VISIBLE
TREMOR: NO TREMOR
AGITATION: NORMAL ACTIVITY
TOTAL SCORE: 0
ORIENTATION AND CLOUDING OF SENSORIUM: ORIENTED AND CAN DO SERIAL ADDITIONS

## 2024-03-13 ASSESSMENT — PAIN - FUNCTIONAL ASSESSMENT: PAIN_FUNCTIONAL_ASSESSMENT: 0-10

## 2024-03-13 ASSESSMENT — PAIN DESCRIPTION - LOCATION
LOCATION: ABDOMEN
LOCATION: BACK

## 2024-03-13 ASSESSMENT — PAIN SCALES - GENERAL
PAINLEVEL_OUTOF10: 5 - MODERATE PAIN
PAINLEVEL_OUTOF10: 0 - NO PAIN

## 2024-03-13 ASSESSMENT — COLUMBIA-SUICIDE SEVERITY RATING SCALE - C-SSRS
2. HAVE YOU ACTUALLY HAD ANY THOUGHTS OF KILLING YOURSELF?: NO
1. SINCE LAST CONTACT, HAVE YOU WISHED YOU WERE DEAD OR WISHED YOU COULD GO TO SLEEP AND NOT WAKE UP?: NO
6. HAVE YOU EVER DONE ANYTHING, STARTED TO DO ANYTHING, OR PREPARED TO DO ANYTHING TO END YOUR LIFE?: NO

## 2024-03-13 ASSESSMENT — PAIN DESCRIPTION - ORIENTATION: ORIENTATION: RIGHT

## 2024-03-13 NOTE — PROGRESS NOTES
Occupational Therapy    [facility] Occupational Therapy Assessment & Treatment    Patient Name: Colin Landa  MRN: [id number]  Today's Date: 3/13/2024      Activity:  Stress Management & Relaxation Strategies Group    Attendance:  Attendance  Activity: Relaxation therapy, Discussion/reminisce  Participation: Active participation    Treatment Approach  Approach : Group therapy sessions  Patient Stated Goals: none stated  Cognition: Attention, Directions (Pt alert, oriented, & attentive. Follows simple multi-step directions independently.)  Social Skills: Demonstrates ability to listen to others, Interacts with others with cues  Emotional: Mood (Pt mood & affect constricted)  Stress Management/Relaxation Training: Identifies difficulty adjusting to illness, hospitalization, or pain, Identifies benefits of stress management/relaxation techniques  Treatment Approach Comments: Pt attended & participated in afternoon therapy group focused on stress management & relaxation. Pt given educational handout, “Balancing Your Stress Bucket” which used the metaphor of a bucket to describe various components of stress management, including predisposing factors, major stressors, everyday stressors, & stress relievers. Reviewed & discussed this handout, emphasizing the importance of keeping up w/ stress relievers consistently. Pt receptive. Then, played video leading pt through various relaxation strategies, including self-massage/acupressure, stretching, deep breathing, & PMR. Pt followed along. After, pt participated in discussion of ways to use relaxation strategies in the context of everyday activities. At end of grp, pt asked for 1 new stress management/relaxation strategy they learned that they would like to try. Pt responded stated that he wants to try more leisure activities & exercise like walking. Pt demonstrates good understanding of topic & motivation to apply skills.      Encounter Problems       Encounter Problems (Active)        OT Goals       OT Goal 1 (Progressing)       Start:  03/12/24    Expected End:  04/09/24       Communication/Interaction Skills (Self-expression/social Behavior/assertive)  Explore/demonstrate 1-2 effective methods of expressing feelings/wants/needs (can include assertion/engaging/sharing/asking) prior to discharge          OT Goal 2 (Progressing)       Start:  03/12/24    Expected End:  04/09/24       Coping Skills  Explore/identify 1-2 effective strategies of expressing feelings, wants, needs(can include assertion, engaging, sharing, asking) prior to discharge          OT Goal 3 (Progressing)       Start:  03/12/24    Expected End:  04/09/24        Emotion Regulation/self control  Pt will exhibit appropriate range, regulation of emotions, impulse control, frustration tolerance in OT groups prior to discharge.                Education:  Pt provided educational handouts on stress management. Reviewed & discussed. Pt also provided education on relaxation strategies including acupressure/self-massage, deep breathing, stretching, & PMR. Reviewed, discussed, & practiced. Pt demonstrates good understanding.
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
## 2024-03-13 NOTE — CARE PLAN
Problem: Risk for Suicide  Goal: Makes needs known through verbalization or behaviors this shift  Outcome: Progressing  Goal: No self harm this shift  Outcome: Progressing  Goal: Read Safety Guidelines this shift  Outcome: Progressing   The patient's goals for the shift include sleep    The clinical goals for the shift include sleep    Over the shift, the patient did not make progress toward the following goals. Barriers to progression include . Recommendations to address these barriers include .    Pt reports moderate anxiety and depression persist. Pt denies self harm thoughts at this time. PT future focused, trying to obtain discharge plans in regards to the programs available for rehab. Pt observed talking to his AA sponsor on phone as well. Pt ate snack and took HS medications. Pt given PRN Atarax for c/o anxiety. Pt stated he did not wish to be woke up to complete CIWA assessments every 2 hours. Pt rested in bed all night.
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
## 2024-03-13 NOTE — NURSING NOTE
While GI consult was performing assessment transport arrived for transfer to EEG. They were unable to wait for Birgit Tucker CNP to complete assessment so they left. Called EEG they informed this nurse that they will try to get him in tomorrow morning. I informed them that pt may be discharged tomorrow and would need it done by noon. EEG acknowledged and said they will call unit in a.m with time.

## 2024-03-13 NOTE — PROGRESS NOTES
Colin Landa is a 29 y.o. male on day 2 of admission presenting with bipolar depression, AUD.  Subjective   Patient ports improvement in sleep.  Patient reports that he has been speaking with people in the  community community who are his supports that he can rely on when he gets out of the hospital.  Patient states he also has a job lined up which she is looking forward to.  Patient tolerated Abilify well with no adverse effects.  Will increase Abilify to 10 mg oral daily today.    Objective     MSE  General: Appropriately groomed and dressed.  Appearance: Appears stated age.  Attitude: Calm, cooperative.  Behavior: Appropriate eye contact.  Motor activity: No agitation or retardation. no EPS.  Normal gait.  Speech: Regular rate, rhythm, volume and tone.  Mood: Less depressed  Affect: Appropriate range  Thought process: Organized, linear, goal-directed.  Associations are logical.  Thought content: Does not endorse suicidal or homicidal ideation, no delusions elicited.  Thought perception: Did not endorse auditory or visual hallucinations.  Cognition: Alert, oriented x3.  no deficit in memory or attention.  Insight: Fair.  Judgment: Fair.    Last Recorded Vitals  /82   Pulse 60   Temp 36.6 °C (97.9 °F)   Resp 18   Ht 1.829 m (6')   Wt 89 kg (196 lb 3.4 oz)   SpO2 93%   BMI 26.61 kg/m²      Relevant Results  Scheduled medications  [START ON 3/14/2024] ARIPiprazole, 10 mg, oral, Daily  folic acid, 1 mg, oral, Daily  lidocaine, 1 patch, transdermal, Daily  multivitamin with minerals, 1 tablet, oral, Daily  nicotine, 1 patch, transdermal, Daily  thiamine, 100 mg, oral, Daily      Continuous medications     PRN medications  PRN medications: alum-mag hydroxide-simeth, diphenhydrAMINE **OR** diphenhydrAMINE, hydrOXYzine HCL, ibuprofen, magnesium hydroxide, traZODone    Results for orders placed or performed during the hospital encounter of 03/11/24 (from the past 24 hour(s))   Urinalysis with Reflex  Microscopic   Result Value Ref Range    Color, Urine Tanya (N) Straw, Yellow    Appearance, Urine Clear Clear    Specific Gravity, Urine 1.031 1.005 - 1.035    pH, Urine 5.0 5.0, 5.5, 6.0, 6.5, 7.0, 7.5, 8.0    Protein, Urine 30 (1+) (N) NEGATIVE mg/dL    Glucose, Urine NEGATIVE NEGATIVE mg/dL    Blood, Urine LARGE (3+) (A) NEGATIVE    Ketones, Urine NEGATIVE NEGATIVE mg/dL    Bilirubin, Urine NEGATIVE NEGATIVE    Urobilinogen, Urine 4.0 (N) <2.0 mg/dL    Nitrite, Urine NEGATIVE NEGATIVE    Leukocyte Esterase, Urine NEGATIVE NEGATIVE   Microscopic Only, Urine   Result Value Ref Range    WBC, Urine NONE 1-5, NONE /HPF    RBC, Urine >20 (A) NONE, 1-2, 3-5 /HPF    Mucus, Urine 4+ Reference range not established. /LPF   ECG 12 lead   Result Value Ref Range    Ventricular Rate 62 BPM    Atrial Rate 62 BPM    RI Interval 136 ms    QRS Duration 90 ms    QT Interval 430 ms    QTC Calculation(Bazett) 436 ms    P Axis -1 degrees    R Axis 49 degrees    T Axis 36 degrees    QRS Count 10 beats    Q Onset 224 ms    P Onset 156 ms    P Offset 212 ms    T Offset 439 ms    QTC Fredericia 435 ms   Ammonia   Result Value Ref Range    Ammonia 71 (H) 16 - 53 umol/L   Protime-INR   Result Value Ref Range    Protime 16.7 (H) 9.8 - 12.8 seconds    INR 1.5 (H) 0.9 - 1.1        Assessment/Plan   Diagnosis:  Bipolar depression, AUD    Impression:     Labs and Chart: reviewed  Case discussed with treatment team members  Encouraged patient to attend group and other mileu activity  Collateral from family - pending  Discharge planing  Medication:       -Abilify 10 mg oral daily    Medication Consent  Medication Consent: risks, benefits, side effects reviewed for all ordered meds and patient expressed understanding and consent obtained    JANA Santa-CNP

## 2024-03-13 NOTE — CARE PLAN
"Pt up in dining room for breakfast, lunch and dinner. Good eye contact. Discharge focussed. Tells this nurse that he will be going to the [facility] tomorrow. Explained to pt he needs to be discharged by doctor. Told him to have [facility] to call unit if they needed intake information. Pt says \"well they didn't say they had a bed, just come at 1pm. Pt compliant with plan of care, Denies SI/HI/AVH. Safety maintained.    The patient's goals for the shift include To be present on unit. Pt did attend groups, has been up in dining room conversing with staff.     The clinical goals for the shift include To be active on therapeutic milieu.      Problem: Risk for Suicide  Goal: Makes needs known through verbalization or behaviors this shift  Outcome: Progressing  Goal: No self harm this shift  Outcome: Progressing  Goal: Read Safety Guidelines this shift  Outcome: Progressing     Problem: Potential for Harm to Self or Others  Goal: Participates in unit activities  Outcome: Progressing  Goal: Patient/Family participate in treatment and discharge plans  Outcome: Progressing  Goal: Identifies deescalation techniques  Outcome: Progressing  Goal: Understands least restrictive measures  Outcome: Progressing  Goal: Identifies stressors that lead to harmful behaviors  Outcome: Progressing  Goal: Notifies staff when experiencing harmful thoughts toward self/others  Outcome: Progressing  Goal: Denies harm toward self or others  Outcome: Progressing  Goal: Free from restraint events  Outcome: Progressing     Problem: Ineffective Coping  Goal: Cooperates with admission process  Outcome: Progressing  Goal: Identifies ineffective coping skills  Outcome: Progressing  Goal: Identifies healthy coping skills  Outcome: Progressing  Goal: Demonstrates healthy coping skills  Outcome: Progressing  Goal: Participates in unit activities  Outcome: Progressing  Goal: Patient/Family participate in treatment and discharge plans  Outcome: " Progressing  Goal: Patient/Family verbalizes awareness of resources  Outcome: Progressing  Goal: Understands least restrictive measures  Outcome: Progressing  Goal: Free from restraint events  Outcome: Progressing     Problem: Alteration in Sleep  Goal: STG - Reports nightly sleep, duration, and quality  Outcome: Progressing  Goal: STG - Informs staff if unable to sleep  Outcome: Progressing  Goal: STG - Attends breathing and relaxation group  Outcome: Progressing     Problem: Potential for Substance Withdrawal  Goal: Reports signs/symptoms of withdrawal  Outcome: Progressing  Goal: Free of withdrawal symptoms  Outcome: Progressing     Problem: Anxiety  Goal: Verbalizes ways to manage anxiety  Outcome: Progressing  Goal: Implements measures to reduce anxiety  Outcome: Progressing  Goal: Free from restraint events  Outcome: Progressing

## 2024-03-14 ENCOUNTER — APPOINTMENT (OUTPATIENT)
Dept: NEUROLOGY | Facility: HOSPITAL | Age: 29
End: 2024-03-14
Payer: MEDICAID

## 2024-03-14 PROCEDURE — 97535 SELF CARE MNGMENT TRAINING: CPT | Mod: GO

## 2024-03-14 PROCEDURE — 2500000005 HC RX 250 GENERAL PHARMACY W/O HCPCS: Performed by: REGISTERED NURSE

## 2024-03-14 PROCEDURE — 2500000002 HC RX 250 W HCPCS SELF ADMINISTERED DRUGS (ALT 637 FOR MEDICARE OP, ALT 636 FOR OP/ED): Performed by: NURSE PRACTITIONER

## 2024-03-14 PROCEDURE — 95816 EEG AWAKE AND DROWSY: CPT

## 2024-03-14 PROCEDURE — 2500000001 HC RX 250 WO HCPCS SELF ADMINISTERED DRUGS (ALT 637 FOR MEDICARE OP): Performed by: PSYCHIATRY & NEUROLOGY

## 2024-03-14 PROCEDURE — 2500000001 HC RX 250 WO HCPCS SELF ADMINISTERED DRUGS (ALT 637 FOR MEDICARE OP): Performed by: NURSE PRACTITIONER

## 2024-03-14 PROCEDURE — 97530 THERAPEUTIC ACTIVITIES: CPT | Mod: GO

## 2024-03-14 PROCEDURE — 2500000001 HC RX 250 WO HCPCS SELF ADMINISTERED DRUGS (ALT 637 FOR MEDICARE OP): Performed by: SPECIALIST

## 2024-03-14 PROCEDURE — RXMED WILLOW AMBULATORY MEDICATION CHARGE

## 2024-03-14 PROCEDURE — 1240000001 HC SEMI-PRIVATE BH ROOM DAILY

## 2024-03-14 PROCEDURE — 99232 SBSQ HOSP IP/OBS MODERATE 35: CPT | Performed by: PSYCHIATRY & NEUROLOGY

## 2024-03-14 PROCEDURE — 2500000001 HC RX 250 WO HCPCS SELF ADMINISTERED DRUGS (ALT 637 FOR MEDICARE OP): Performed by: REGISTERED NURSE

## 2024-03-14 RX ORDER — FUROSEMIDE 40 MG/1
40 TABLET ORAL DAILY
Qty: 30 TABLET | Refills: 0 | Status: SHIPPED | OUTPATIENT
Start: 2024-03-15 | End: 2024-04-14

## 2024-03-14 RX ORDER — SPIRONOLACTONE 50 MG/1
50 TABLET, FILM COATED ORAL 2 TIMES DAILY
Qty: 60 TABLET | Refills: 0 | Status: SHIPPED | OUTPATIENT
Start: 2024-03-14 | End: 2024-04-14

## 2024-03-14 RX ORDER — CIPROFLOXACIN 500 MG/1
500 TABLET ORAL DAILY
Qty: 30 TABLET | Refills: 0 | Status: SHIPPED | OUTPATIENT
Start: 2024-03-15 | End: 2024-04-14

## 2024-03-14 RX ORDER — ARIPIPRAZOLE 10 MG/1
10 TABLET ORAL DAILY
Qty: 5 TABLET | Refills: 0 | Status: SHIPPED | OUTPATIENT
Start: 2024-03-15 | End: 2024-03-20

## 2024-03-14 RX ADMIN — CIPROFLOXACIN 500 MG: 250 TABLET, COATED ORAL at 08:44

## 2024-03-14 RX ADMIN — Medication 1 TABLET: at 08:43

## 2024-03-14 RX ADMIN — SPIRONOLACTONE 50 MG: 25 TABLET ORAL at 20:07

## 2024-03-14 RX ADMIN — SPIRONOLACTONE 50 MG: 25 TABLET ORAL at 08:43

## 2024-03-14 RX ADMIN — ARIPIPRAZOLE 10 MG: 10 TABLET ORAL at 08:43

## 2024-03-14 RX ADMIN — Medication 1000 UNITS: at 08:45

## 2024-03-14 RX ADMIN — TRAZODONE HYDROCHLORIDE 50 MG: 50 TABLET ORAL at 20:07

## 2024-03-14 RX ADMIN — LIDOCAINE 1 PATCH: 4 PATCH TOPICAL at 08:44

## 2024-03-14 RX ADMIN — FOLIC ACID 1 MG: 1 TABLET ORAL at 08:43

## 2024-03-14 RX ADMIN — IBUPROFEN 600 MG: 600 TABLET, FILM COATED ORAL at 20:08

## 2024-03-14 RX ADMIN — Medication 100 MG: at 08:43

## 2024-03-14 RX ADMIN — FUROSEMIDE 40 MG: 40 TABLET ORAL at 08:43

## 2024-03-14 RX ADMIN — HYDROXYZINE HYDROCHLORIDE 50 MG: 25 TABLET ORAL at 20:07

## 2024-03-14 ASSESSMENT — PAIN - FUNCTIONAL ASSESSMENT
PAIN_FUNCTIONAL_ASSESSMENT: 0-10

## 2024-03-14 ASSESSMENT — PAIN SCALES - GENERAL
PAINLEVEL_OUTOF10: 0 - NO PAIN
PAINLEVEL_OUTOF10: 0 - NO PAIN
PAINLEVEL_OUTOF10: 3

## 2024-03-14 ASSESSMENT — ACTIVITIES OF DAILY LIVING (ADL): HOME_MANAGEMENT_TIME_ENTRY: 15

## 2024-03-14 ASSESSMENT — PAIN DESCRIPTION - LOCATION: LOCATION: ABDOMEN

## 2024-03-14 NOTE — PROGRESS NOTES
"Occupational Therapy     REHAB Therapy Assessment & Treatment    Patient Name: Colin Landa  MRN: [id number]  Today's Date: 3/14/2024      Relaxation skills Activity:     Attendance:  Attendance  Activity: Discussion/reminisce, One-to-one (Clinical Depression, Anxiety & irrational thinking linked to relapse)  Participation: Active participation    Therapeutic Activity  Treatment Approach  Approach : 15 to 25 minutes, 30 to 45 minutes  Patient Stated Goals: to gdet clean and stay clean  Cognition: Attention, Directions  Social Skills: Demonstrates ability to listen to others, Cooperates with others in group activity  Emotional: Mood, Behaviors (pt noted feeling hopeful and being motivated to attend ssessions in spite hving a back ache from the mattress)  Stress Management/Relaxation Training: Performs stress management/relaxation techniques  Treatment Approach Comments: Ther. Act: \"Clinical Depression\" paper reviewed Pt scored low with lack of balance in productive time the day due to severe anxiety & depression; reviewed \"Adopting a Healthy Lifestyle\" paper & noted the need for memory tips to improve concentration. Pt scored low in having adequate stress mgt skills per the \"Stress Mgt Quiz\". He asked several appropriate questions on the effects of ETOH on quality sleep, energy level as well as ETOH's effects on Antidepressants usage. Pt noted his continued frustration w/being ill from ETOH withdarw causing poor quality sleep, poor motivation for productive work & healthy leisure. Pt receptive to using 3 handouts this eening to reassess past relationships in learning about (+) & (-) traits for increased self awareness in future rellationships. Pt participated in relaxation techniques of PLB, Guided Imagery, while noting the Life Saver Technique as most helpful. Afterwards, Pt noted her (-) self-talk continued to add to his anxiety. He and OT reviewed the Causing My Own Sadness paper to increase self awareness to " actively intervene w/(+) phrases while doing 15mins of Progressive Muscle tensing/release, as OT instructs the session.      Encounter Problems       Encounter Problems (Active)       OT Goals       OT Goal 1 (Progressing)       Start:  03/12/24    Expected End:  04/09/24       Communication/Interaction Skills (Self-expression/social Behavior/assertive)  Explore/demonstrate 1-2 effective methods of expressing feelings/wants/needs (can include assertion/engaging/sharing/asking) prior to discharge          OT Goal 2 (Progressing)       Start:  03/12/24    Expected End:  04/09/24       Coping Skills  Explore/identify 1-2 effective strategies of expressing feelings, wants, needs(can include assertion, engaging, sharing, asking) prior to discharge          OT Goal 3 (Progressing)       Start:  03/12/24    Expected End:  04/09/24        Emotion Regulation/self control  Pt will exhibit appropriate range, regulation of emotions, impulse control, frustration tolerance in OT groups prior to discharge.

## 2024-03-14 NOTE — PROGRESS NOTES
Colin Landa is a 29 y.o. male on day 3 of admission presenting with bipolar depression, alcohol use disorder    Subjective   Patient ports improved mood today, patient is sleeping well.  Patient being followed by GI as patient has significant history of liver cirrhosis.  Recommend patient remain on diuretics and ciprofloxacin.  Patient has been active on the unit, participating group therapy, socializing with staff and peers.  Patient agreeable to receive Abilify Maintena 300 mg IM today.    Objective     MSE  General: Appropriately groomed and dressed.  Appearance: Appears stated age.  Attitude: Calm, cooperative.  Behavior: Appropriate eye contact.  Motor activity: No agitation or retardation. no EPS.  Normal gait.  Speech: Regular rate, rhythm, volume and tone.  Mood: Less depressed  Affect: Appropriate range  Thought process: Organized, linear, goal-directed.  Associations are logical.  Thought content: Does not endorse suicidal or homicidal ideation, no delusions elicited.  Thought perception: Did not endorse auditory or visual hallucinations.  Cognition: Alert, oriented x3.  no deficit in memory or attention.  Insight: Fair.  Judgment: Fair.    Last Recorded Vitals  /74   Pulse 68   Temp 36.8 °C (98.2 °F)   Resp 16   Ht 1.829 m (6')   Wt 89 kg (196 lb 3.4 oz)   SpO2 95%   BMI 26.61 kg/m²      Relevant Results  Scheduled medications  ARIPiprazole, 300 mg, intramuscular, q28 days  ARIPiprazole, 10 mg, oral, Daily  cholecalciferol, 1,000 Units, oral, Daily  ciprofloxacin, 500 mg, oral, Daily  folic acid, 1 mg, oral, Daily  furosemide, 40 mg, oral, Daily  lidocaine, 1 patch, transdermal, Daily  multivitamin with minerals, 1 tablet, oral, Daily  nicotine, 1 patch, transdermal, Daily  spironolactone, 50 mg, oral, BID  thiamine, 100 mg, oral, Daily      Continuous medications     PRN medications  PRN medications: alum-mag hydroxide-simeth, diphenhydrAMINE **OR** diphenhydrAMINE, hydrOXYzine HCL,  ibuprofen, magnesium hydroxide, traZODone    Results for orders placed or performed during the hospital encounter of 03/11/24 (from the past 24 hour(s))   Red Top   Result Value Ref Range    Extra Tube Hold for add-ons.    SST TOP   Result Value Ref Range    Extra Tube Hold for add-ons.    PST Top   Result Value Ref Range    Extra Tube Hold for add-ons.    Protime-INR   Result Value Ref Range    Protime 16.7 (H) 9.8 - 12.8 seconds    INR 1.5 (H) 0.9 - 1.1        Assessment/Plan   Diagnosis:  Bipolar depression, AUD    Impression:     Labs and Chart: reviewed  Case discussed with treatment team members  Encouraged patient to attend group and other mileu activity  Collateral from family - pending  Discharge planing  Medication:       -Patient received Abilify Maintena 300 mg IM today       -Abilify 10 mg oral daily    Medication Consent  Medication Consent: risks, benefits, side effects reviewed for all ordered meds and patient expressed understanding and consent obtained    JANA Santa-CNP

## 2024-03-14 NOTE — PROGRESS NOTES
"Social Work Note  Pt presents with positive affect saying he feels \"positive\" today and does not have \"bad feelings.\"  He is hopeful for discharge today - \"I have a [discharge] plan now.\"  Plans to go to [facility] for his meds, go to  ex girlfriend's to amicably  belongings and then go to [facility].  States Edy at [facility] says he is to call when discharged and they had 2 bed and \"just come in\"   Pt states he has job and sober friends near there.  He states his back up plan would be to go to his sister Syed and if beds are gone, go to [facility].  Pt completed mental health safety plan and was able to identify supports, coping strategies and reasons to live.   "

## 2024-03-14 NOTE — NURSING NOTE
Pt received Bertha Vaughan this afternoon, education provided. Mood and affect improved, bright, good eye contact and appropriate interactions on milieu. Discharge focused. Denies SI/HI/AVH. Safety maintained.

## 2024-03-14 NOTE — CARE PLAN
Problem: Risk for Suicide  Goal: Makes needs known through verbalization or behaviors this shift  Outcome: Progressing  Goal: No self harm this shift  Outcome: Progressing  Goal: Read Safety Guidelines this shift  Outcome: Progressing   The patient's goals for the shift include sleep    The clinical goals for the shift include sleep    Over the shift, the patient did not make progress toward the following goals. Barriers to progression include . Recommendations to address these barriers include .    Pt reports he has a rehab set up to go to upon discharge and a job to go to. Pt reports feeling decrease in initial symptoms and denies self harm thoughts. Pt took HS medications and ate snack. Pt c/o of moderately high anxiety during late evening due to phone call from his sister who stated she is moving out of state which caused the patient to have some distress. Pt given PRN Atarax at that time per pt request. Pt then rested in bed all night.

## 2024-03-14 NOTE — PROGRESS NOTES
The patient is awake and alert  Ambulating without any problem  Speech is clear  Interacting with the staff  EEG pending probably tomorrow  Patient is not in any distress      Visit Vitals  /86   Pulse 61   Temp 36.6 °C (97.9 °F)   Resp 16        Neurological Exam:    Oriented to day date,month,year, place and person. Knew the name of the president        Recent and remote memory was intact. Attention span and concentration were normal. General fund of knowledge was intact.   Language: speech comprehension, repetition, expression, and naming is intact for patient´s age and level of education.      CRANIAL NERVES:   CN 2 The fundi were well visualized with normal disc margins, clear vessels and vascular pulsations. No disc edema.  No hemorrhages or exudates were present in the posterior segments that were visualized. Visual fields full to confrontation.   CN 3, 4, 6 Pupils round, equally reactive to light. No ptosis. EOM normal alignment, full range with normal saccades, pursuit and convergence. No nystagmus.   CN 5 Facial sensation intact bilaterally.   CN 7 Normal and symmetric facial strength. Nasolabial folds symmetric.   CN 8 Hearing intact to finger rub bilaterally. On [place] testing there was no lateralisation  CN 9 Palate elevates symmetrically.   CN 11 Bilaterally normal strength of shoulder shrug and neck turning.   CN 12 Tongue midline, with normal bulk and strength; no fasciculations.   Patient is handling pharyngeal secretions well.   Speech: articulation is intact.      MOTOR: The patient has normal muscle tone and has normal muscle mass. Muscle strength was 5/5 in distal and proximal muscles in both upper and lower extremities. No fasciculations, tremor or other abnormal movements were present.   On [place] testing the patient has no pronation or drift.      REFLEXES:   RIGHT UE         LEFT UE   BR:          1                        BR:     1        Biceps:    1                         Biceps1  Triceps:   1                       Triceps: 1  RIGHT LLE        LEFT LLE   Patella:   2                         Patella: 2  Achilles: 2                        Achilles:2  Babinski: plantar responses are flexor.   No frontal release signs or other pathologic reflexes present.      COORDINATION: In both upper extremities, finger-nose-finger was intact without dysmetria. No dysdiadochokinesia. In both lower extremities, heel-to-knee-shin was intact.      GAIT: Normal base, arm swing and speed. Normal posture. No ataxia. Tandem gait was intact going forwards and backwards.  He could walk on his toes and heels without any problem. No Romberg sign was present. Postural reflexes were normal.     SENSORY: In both upper and lower extremities, sensation was decreased to pinprick, temperature below the knees and elbows bilaterally, vibration decreased in the distal extremities, and intact joint position sense.    BMP:  Results from last 7 days   Lab Units 03/12/24  0758 03/10/24  1956   SODIUM mmol/L 137 138   POTASSIUM mmol/L 3.6 3.4*   CHLORIDE mmol/L 104 102   CO2 mmol/L 24 25   BUN mg/dL 16 10   CREATININE mg/dL 0.86 0.80       CBC:  Results from last 7 days   Lab Units 03/10/24  1956   WBC AUTO x10*3/uL 6.9   RBC AUTO x10*6/uL 4.19*   HEMOGLOBIN g/dL 14.1   HEMATOCRIT % 40.0*   MCV fL 96   MCH pg 33.7   MCHC g/dL 35.3   RDW % 15.2*   PLATELETS AUTO x10*3/uL 122*       INR:  Results from last 7 days   Lab Units 03/13/24  1608   PROTIME seconds 16.7*   INR  1.5*       Lipid Profile:  Results from last 7 days   Lab Units 03/12/24  0758   TRIGLYCERIDES mg/dL 53   HDL mg/dL 52.5   LDL CALC mg/dL 109*       HgbA1C:        CMP:  Results from last 7 days   Lab Units 03/12/24 0758 03/10/24  1956   SODIUM mmol/L 137 138   POTASSIUM mmol/L 3.6 3.4*   CHLORIDE mmol/L 104 102   CO2 mmol/L 24 25   BUN mg/dL 16 10   CREATININE mg/dL 0.86 0.80   GLUCOSE mg/dL 114* 120*   CALCIUM mg/dL 8.8 8.4*   PROTEIN TOTAL g/dL 6.7 7.4  "  BILIRUBIN TOTAL mg/dL 3.6* 2.3*   ALK PHOS U/L 109 97   AST U/L 63* 84*   ALT U/L 56* 69*       ABG:        No lab exists for component: \"PO2\", \"PCO2\", \"HCO3\", \"BE\", \"O2SAT\"    TSH:  Lab Results   Component Value Date    TSH 1.39 03/12/2024     Lab Results   Component Value Date    OEGVFMXT32 427 03/12/2024     Lab Results   Component Value Date    FOLATE 8.8 03/12/2024     Lab Results   Component Value Date    VITD25 22 (L) 03/12/2024         No MRI head results found for the past 12 months     No CT head results found for the past 12 months     ECG 12 lead    Result Date: 3/12/2024  Normal sinus rhythm with sinus arrhythmia Normal ECG When compared with ECG of 17-AUG-2023 22:48, Previous ECG has undetermined rhythm, needs review Nonspecific T wave abnormality no longer evident in Anterior leads    XR chest 1 view    Result Date: 3/12/2024  Interpreted By:  Linda Koo, STUDY: XR CHEST 1 VIEW;  3/12/2024 4:15 pm   INDICATION: Signs/Symptoms:right thoracic pain hit on bed?.   COMPARISON: 08/18/2023   ACCESSION NUMBER(S): [id number]   ORDERING CLINICIAN: ALEJO ROBBINS   FINDINGS: Heart is normal in size.   There is no consolidation or pleural fluid.   Mediastinum and bones are unremarkable.   COMPARISON OF FINDING: The basilar atelectasis has resolved.       No acute cardiopulmonary disease.   MACRO: none   Signed by: Linda Koo 3/12/2024 4:21 PM Dictation workstation:   [id number]      EMG     No EMG results found for the past 12 months        No EEG results found for the past 12 months      Current Facility-Administered Medications:     alum-mag hydroxide-simeth (Mylanta) 200-200-20 mg/5 mL oral suspension 10 mL, 10 mL, oral, 4x daily PRN, JAIDEN Santa    [START ON 3/14/2024] ARIPiprazole (Abilify) tablet 10 mg, 10 mg, oral, Daily, Iban Ramirez MD    ciprofloxacin (Cipro) tablet 500 mg, 500 mg, oral, Daily, JAIDEN Roldan, 500 mg at 03/13/24 1851    diphenhydrAMINE (Sominex) " tablet 50 mg, 50 mg, oral, q6h PRN **OR** diphenhydrAMINE (BENADryl) injection 50 mg, 50 mg, intramuscular, Once PRN, JAIDEN Santa    folic acid (Folvite) tablet 1 mg, 1 mg, oral, Daily, JAIDEN Santa, 1 mg at 03/13/24 0836    furosemide (Lasix) tablet 40 mg, 40 mg, oral, Daily, JAIDEN Roldan, 40 mg at 03/13/24 1851    hydrOXYzine HCL (Atarax) tablet 50 mg, 50 mg, oral, q6h PRN, JAIDEN Santa, 50 mg at 03/12/24 2126    ibuprofen tablet 600 mg, 600 mg, oral, q8h PRN, JAIDEN Santa, 600 mg at 03/12/24 2106    lidocaine 4 % patch 1 patch, 1 patch, transdermal, Daily, JAIDEN Valera, 1 patch at 03/13/24 0836    magnesium hydroxide (Milk of Magnesia) 2,400 mg/10 mL suspension 10 mL, 10 mL, oral, Daily PRN, JAIDEN Santa    multivitamin with minerals 1 tablet, 1 tablet, oral, Daily, JAIDEN Santa, 1 tablet at 03/13/24 0836    nicotine (Nicoderm CQ) 14 mg/24 hr patch 1 patch, 1 patch, transdermal, Daily, JAIDEN Santa    spironolactone (Aldactone) tablet 50 mg, 50 mg, oral, BID, JAIDEN Roldan    thiamine (Vitamin B-1) tablet 100 mg, 100 mg, oral, Daily, JAIDEN Santa, 100 mg at 03/13/24 0836    traZODone (Desyrel) tablet 50 mg, 50 mg, oral, Nightly PRN, JAIDEN Santa, 50 mg at 03/12/24 2107         Assessment:  Patient has a history of 2 seizures several years ago related to tramadol intake  He was on Keppra for a few weeks and then discontinued without any problem.  He had avoided the tramadol and did not have any recurrent seizure  History of cirrhosis alcohol-related.  He has cut down the drinking significantly  He is aware that he needs to go off the alcohol.  Peripheral neuropathy probably related to alcohol  Elevated liver function testing with elevated bilirubin  History of cardiac and renal injuries in the past  He is using marijuana.  He vapes  nicotine  History of anxiety and depression  Vitamin D deficiency  Supplement 1000 mg daily  Recommendation:  EEG.  If the EEG is normal no need for any anticonvulsants  Additional lab work showed low vitamin D level.    B12 folic acid level TSH unremarkable  EEG for tomorrow morning  Supplement vitamin D 1000 units daily  Pro time elevated probably due to cirrhosis.  GI on consult     Tyrone Palencia MD

## 2024-03-14 NOTE — GROUP NOTE
Group Topic: Music Therapy   Group Date: 3/12/2024  Start Time: 1300  End Time: 1400  Facilitators: Aminta Moreau   Department: [facility] EXPRESSIVE THER VIRTUAL    Number of Participants: 6   Group Focus: expressive outlet and music therapy  Treatment Modality: Music Therapy  Interventions Utilized were: empathic listening/validating emotions, sharing/discussion, and songwriting/composition      Name: Colin Landa YOB: 1995   MR: [id number]      Level of Participation: moderate  Quality of Participation: appropriate/pleasant, isolative, and passive  Interactions with others: appropriate  Mood/Affect: brightens with interaction and flat  Cognition, Pre Treatment: attentive and capable  Cognition, Post Treatment: attentive and capable  Progress: Minimal  Plan: continue with services

## 2024-03-15 ENCOUNTER — PHARMACY VISIT (OUTPATIENT)
Dept: PHARMACY | Facility: CLINIC | Age: 29
End: 2024-03-15
Payer: MEDICAID

## 2024-03-15 VITALS
HEIGHT: 72 IN | HEART RATE: 78 BPM | TEMPERATURE: 97.5 F | RESPIRATION RATE: 18 BRPM | BODY MASS INDEX: 26.58 KG/M2 | SYSTOLIC BLOOD PRESSURE: 142 MMHG | DIASTOLIC BLOOD PRESSURE: 78 MMHG | WEIGHT: 196.21 LBS | OXYGEN SATURATION: 97 %

## 2024-03-15 LAB — AMMONIA PLAS-SCNC: 52 UMOL/L (ref 16–53)

## 2024-03-15 PROCEDURE — 2500000001 HC RX 250 WO HCPCS SELF ADMINISTERED DRUGS (ALT 637 FOR MEDICARE OP): Performed by: REGISTERED NURSE

## 2024-03-15 PROCEDURE — 99239 HOSP IP/OBS DSCHRG MGMT >30: CPT | Performed by: PSYCHIATRY & NEUROLOGY

## 2024-03-15 PROCEDURE — 2500000002 HC RX 250 W HCPCS SELF ADMINISTERED DRUGS (ALT 637 FOR MEDICARE OP, ALT 636 FOR OP/ED): Performed by: NURSE PRACTITIONER

## 2024-03-15 PROCEDURE — 36415 COLL VENOUS BLD VENIPUNCTURE: CPT | Performed by: REGISTERED NURSE

## 2024-03-15 PROCEDURE — 97530 THERAPEUTIC ACTIVITIES: CPT | Mod: GO

## 2024-03-15 PROCEDURE — 2500000001 HC RX 250 WO HCPCS SELF ADMINISTERED DRUGS (ALT 637 FOR MEDICARE OP): Performed by: NURSE PRACTITIONER

## 2024-03-15 PROCEDURE — 82140 ASSAY OF AMMONIA: CPT | Performed by: REGISTERED NURSE

## 2024-03-15 PROCEDURE — 2500000001 HC RX 250 WO HCPCS SELF ADMINISTERED DRUGS (ALT 637 FOR MEDICARE OP): Performed by: PSYCHIATRY & NEUROLOGY

## 2024-03-15 PROCEDURE — 2500000001 HC RX 250 WO HCPCS SELF ADMINISTERED DRUGS (ALT 637 FOR MEDICARE OP): Performed by: SPECIALIST

## 2024-03-15 PROCEDURE — 99231 SBSQ HOSP IP/OBS SF/LOW 25: CPT | Performed by: REGISTERED NURSE

## 2024-03-15 PROCEDURE — 97150 GROUP THERAPEUTIC PROCEDURES: CPT | Mod: GO

## 2024-03-15 RX ADMIN — FOLIC ACID 1 MG: 1 TABLET ORAL at 08:46

## 2024-03-15 RX ADMIN — ARIPIPRAZOLE 10 MG: 10 TABLET ORAL at 08:46

## 2024-03-15 RX ADMIN — Medication 100 MG: at 08:45

## 2024-03-15 RX ADMIN — CIPROFLOXACIN 500 MG: 250 TABLET, COATED ORAL at 08:45

## 2024-03-15 RX ADMIN — FUROSEMIDE 40 MG: 40 TABLET ORAL at 08:46

## 2024-03-15 RX ADMIN — Medication 1 TABLET: at 08:46

## 2024-03-15 RX ADMIN — IBUPROFEN 600 MG: 600 TABLET, FILM COATED ORAL at 11:14

## 2024-03-15 RX ADMIN — Medication 1000 UNITS: at 08:46

## 2024-03-15 RX ADMIN — SPIRONOLACTONE 50 MG: 25 TABLET ORAL at 08:46

## 2024-03-15 ASSESSMENT — PAIN SCALES - GENERAL
PAINLEVEL_OUTOF10: 0 - NO PAIN
PAINLEVEL_OUTOF10: 3

## 2024-03-15 ASSESSMENT — PAIN - FUNCTIONAL ASSESSMENT: PAIN_FUNCTIONAL_ASSESSMENT: 0-10

## 2024-03-15 NOTE — PROGRESS NOTES
Social Work Note  Pt is discharging today. Pt continues with plan to go to [facility] and secured address for [facility].

## 2024-03-15 NOTE — DISCHARGE INSTRUCTIONS
BELOW IS YOUR CARE PLAN FROM GASTROENTEROLOGY. PLEASE FOLLOW PLAN UNTIL UPDATED BY YOUR HEPATOLOGIST. QUESTIONS? PLEASE CALL YOUR HEPATOLOGIST OR REQUEST FOR EXPLANATION PRIOR TO DISCHARGE, or contact your Hepatologist throughout your recovery journey.     Plan  -Continue Aldactone 100 mg daily and Lasix 40 mg daily  -Continue Cipro 500 mg daily as prescribed for SBP prophylaxis, should be on indefinitely  -low salt diet  -needs repeat EGD due to banding of large varices on EGD 8/29/2023, but can be done outpatient -SCHEDULE FOR EGD - CALL MAKE APPT.   -recommend abdominal ultrasound every 6 months to screen for HCC  --Follow-up with hepatology for further management of liver cirrhosis and hep C  -Avoid NSAIDs

## 2024-03-15 NOTE — DISCHARGE SUMMARY
Discharge Diagnosis:  Bipolar depression    Hospital Course:  Patient is a 29 year old male with a history of bipolar disorder and AUD who was admitted to [facility] for suicidal ideation. Due to acutely elevated and imminent risk for self-harm/harm to others, patient required a level of care equivalent to inpatient hospitalization for safety, evaluation, treatment and stabilization.     The patient was admitted to [facility] under the care of Dr. Ramirez, restricted to the ortiz and placed on suicide, behavior and elopement precautions.  At the beginning of hospitalization, patient presented to the ED with suicidal ideation.  Patient reported multiple stressors including recent loss of his, break-up with his girlfriend.  Patient ports he has 3 children from different mothers.  He stated that he does not get to see them as he would like to due to difficulties in the relationships with their mothers.  Patient ports he was living with his ex-girlfriend, but she kicked him out.  Patient reported he has been struggling with alcohol use, states he is interested in going to a long term house so that he continue to work but also work on recovery.  Patient reported that he has not been compliant with psychiatric medications.       The treatment team made the following interventions: medication, group/milieu therapy, individual therapy    Over the course of hospitalization, patient reported improvement and objective signs of improvement were noted by staff. Patient reported improved mood and sleep. Patient active on the unit participating in group/individual therapy. Patient received Abilify Maintenna 300 mg IM on 3/14. Patient exhibiting good insight planned to go to sober living once discharged.    Psychiatric Medications: Abilify Maintenna 300 mg IM on 3/14.  Patient tolerated medications without side effects.    Prior to the date of discharge, patient was able to contract for safety and stated they  felt safe and appropriate for discharge.  The treatment team found the patient not to be an imminent danger to self or others.  The patient denied suicidal or homicidal ideation and did not endorse auditory and visual hallucinations.  The patient's condition at the time of discharge was stable and initial symptoms improved over the course of hospitalization.    The patient was discharged home under the supervision of family with a 5-day supply of abilify 10 mg for oral overlap of Abilify Maintenna MOSS.    The patient was instructed to call the patient's outpatient provider in the event of worsening symptoms or medication side effects.  Should the patient be unable to maintain their personal safety or the safety of others, instructions were provided to dial 9-1-1 or go to the closest emergency room.    Discharge Mental Status Exam:  General:  Patient is awake, alert, and oriented to person, place, time, and situation.    Appearance:  Appears well-hydrated, well-nourished, and well-groomed and approximately stated age.   Attitude:  Patient was calm and cooperative throughout the interview, which is appropriate to the context of the interview and the topics discussed.   Behavior:  Eye contact is appropriate with topics of discussion.   Motor Activity:  Motor activity is normal. No psychomotor disturbances or abnormal involuntary movements were noted, including psychomotor agitation, psychomotor retardation, involuntary movements, extrapyramidal symptoms, akathisia, or tardive dyskinesia. Gait is normal.   Speech:  Speech is spontaneous, coherent, fluent and of appropriate quantity, rate, volume, and tone and non-vulgar/vulgar.  Speech and mannerisms are consistent with topics of discussion.   Affect:  Euthymic with a full range, mood congruent and appropriate to content.   Thought Process:  Thought process was linear, organized, and goal-directed and devoid of loose associations, flight of ideas, thought blocking or  tangents.   Thought Content:  Thought content was devoid of suicidal ideation or intent, homicidal ideation or intent, self-harm ideation or intent, delusions, illusions, obsessions, or paranoia.   Thought Perception:  Did not endorse auditory or visual hallucinations. Patient did not appear to be internally distracted or preoccupied.   Cognition:  Knowledge and intelligence are believed to be average.  Recent and remote memory, fund of knowledge, and abstract reasoning appear grossly intact, appropriate for age and education, and there are no impairments in attention, concentration, or language.   Insight:  Insight regarding psychiatric conditions is good.   Judgment:  Judgment is good.    Recent Labs:  No results found for this or any previous visit (from the past 24 hour(s)).     Risk Assessment at Discharge:  Violence Risk Assessment: major mental illness, male, and substance abuse  Acute Risk of Harm to Others is Considered: low   Risk Mitigated by: Adherence to treatment, strong therapeutic alliance. Follow-up.    Suicide Risk Assessment: , chronic medical illness, current psychiatric illness, and male  Protective Factors against Suicide: child-related concerns/living with children at home < 18 yrs, employment, hopefulness/future orientation, positive family relationships, sense of responsibility toward family, and social support/connectedness  Acute Risk of Harm to Self is Considered: low  Risk Mitigated by: Adherence to treatment, strong therapeutic alliance. Follow-up.    Sukhdev Patrick, JANA-CNP

## 2024-03-15 NOTE — PROGRESS NOTES
"Occupational Therapy     REHAB Therapy Assessment & Treatment    Patient Name: Colin Landa  MRN: [id number]  Today's Date: 3/15/2024      Group Activity: Assertive Communication skills   Attendance:  Attendance  Activity: Discussion/reminisce (Short Circuiting Stress/Assertive Communication)  Participation: Active participation  Therapeutic Activity: Short Circuiting Stress  Treatment Approach  Approach : 15 to 25 minutes, 30 to 45 minutes  Patient Stated Goals: to love myself again and take care of me 1st  Cognition: Attention, Directions (Pt's statements & contributions to discussion are linear, organized, & on-topic as follows complex multi-step directions independently. Pt demonstrates good, organized understanding of material reviewed in each session.)  Social Skills: Cooperates with others in group activity, Demonstrates ability to listen to others, Interacts independently in social activity  Emotional: Behaviors, Mood (Pt is appropriately spontaneous in his interactions WFL through active listening, turn-taking, calm & cooperative. He is affect animated & appropriate.)  Stress Management/Relaxation Training: Utilizes stress management/relaxation techniques  Treatment Approach Comments: Ther. Act: Pt referenced the “Short Circuiting Stress” paper on \"A+B=C\" equation & identified rational problem solving tips \"...shift focus forward for a solution, seeing (+) in (-) situations, seeking out a fresh point of view by stepping away from a situation or from a friend/sponsor to decrease stress & avoid relapse.\"  Pt reviewed Assertiveness skills to get needs met & improve interpersonal relations, \"I can't make Patience bring the kids to me, but I can keep the line of communication open to my girl,\" per Pt. He role played “I feel/need…”, delegating, prioritizing & saying, ”No…” to improve esteem and gaining self-control over stress. Pt noted a goal to be compliant w/meds and counseling after d/c. Group: Pt verbalized 2 " (+) coping tools to prevent relapse i.e.: have a balanced daily routine between quality sleep, adequate self-care, productive work & healthy leisure as well as look into AA mtgs to gain/maintain sobriety. He participated in relaxation techniques of PLB, (+) self-talk phrases & Life Saver Technique to safely decrease panic while decreasing cravings.      Encounter Problems       Encounter Problems (Active)       OT Goals       OT Goal 1 (Progressing)       Start:  03/12/24    Expected End:  04/09/24       Communication/Interaction Skills (Self-expression/social Behavior/assertive)  Explore/demonstrate 1-2 effective methods of expressing feelings/wants/needs (can include assertion/engaging/sharing/asking) prior to discharge          OT Goal 2 (Progressing)       Start:  03/12/24    Expected End:  04/09/24       Coping Skills  Explore/identify 1-2 effective strategies of expressing feelings, wants, needs(can include assertion, engaging, sharing, asking) prior to discharge          OT Goal 3 (Progressing)       Start:  03/12/24    Expected End:  04/09/24        Emotion Regulation/self control  Pt will exhibit appropriate range, regulation of emotions, impulse control, frustration tolerance in OT groups prior to discharge.

## 2024-03-15 NOTE — GROUP NOTE
Group Topic: Music Therapy   Group Date: 3/14/2024  Start Time: 1300  End Time: 1415  Facilitators: Aminta Moreau   Department: [facility]    Number of Participants: 6   Group Focus: coping skills/planning and opportunity for choice/control  Treatment Modality: Music Therapy  Interventions Utilized were: passive music engagement      Name: Colin Landa YOB: 1995   MR: [id number]      Level of Participation: minimal  Quality of Participation:  N/A- Showed up late. Was sleeping at arrived late to group.  Interactions with others: appropriate  Mood/Affect: appropriate, flat, and Tired  Cognition, Pre Treatment: capable  Cognition, Post Treatment: capable  Progress: minimal due to arriving late  Plan: continue with services

## 2024-03-15 NOTE — PROGRESS NOTES
Colin Landa is a 29 y.o. male on day 4 of admission presenting with Mental disorder.      Subjective   Patient examined and seen. Alert and oriented x3, explained to patient assessment, right to , and the right to decline assessment. Patient verbalized understanding and agreed to assessment with RN as . Patient denies chest pain, shortness of breath, palpitations, abdominal pain, fever or chills.      Patient voices no abdominal pain, dysuria, frequency of urination, or discharge. He reports no acute issues.      Objective     Last Recorded Vitals  /78   Pulse 78   Temp 36.4 °C (97.5 °F) (Temporal)   Resp 18   Wt 89 kg (196 lb 3.4 oz)   SpO2 97%   Intake/Output last 3 Shifts:  No intake or output data in the 24 hours ending 03/15/24 1258    Admission Weight  Weight: 89 kg (196 lb 3.4 oz) (03/11/24 2156)    Daily Weight  03/11/24 : 89 kg (196 lb 3.4 oz)    Image Results  EEG  IMPRESSION    This routine EEG is normal. No epileptiform discharges or lateralizing signs are seen.    This report has been interpreted and electronically signed by      Physical Exam  Constitutional: Well developed, awake/alert/oriented x3, , cooperative    Respiratory/Thorax: Patent airways,  normal breath   Cardiovascular: Regular, rate and rhythm,   Gastrointestinal: Nondistended, soft, non-tender,  +BS x 4 quadrants  Genitourinary: voiding freely without CVA tenderness or suprabupic tenderness   Neurological: alert/oriented x 3, speech clear,     Relevant Results      Scheduled medications  ARIPiprazole, 300 mg, intramuscular, q28 days  ARIPiprazole, 10 mg, oral, Daily  cholecalciferol, 1,000 Units, oral, Daily  ciprofloxacin, 500 mg, oral, Daily  folic acid, 1 mg, oral, Daily  furosemide, 40 mg, oral, Daily  lidocaine, 1 patch, transdermal, Daily  multivitamin with minerals, 1 tablet, oral, Daily  nicotine, 1 patch, transdermal, Daily  spironolactone, 50 mg, oral, BID  thiamine, 100 mg, oral,  Daily      Continuous medications     PRN medications  PRN medications: alum-mag hydroxide-simeth, diphenhydrAMINE **OR** diphenhydrAMINE, hydrOXYzine HCL, ibuprofen, magnesium hydroxide, traZODone  Results for orders placed or performed during the hospital encounter of 03/11/24 (from the past 24 hour(s))   Ammonia   Result Value Ref Range    Ammonia 52 16 - 53 umol/L            Assessment/Plan      # 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   Low salt diet  See GI notes for further recommendations     # Microscopic Hematuria  Repeated UA with microscopic hematuria  No signs of dysuria, discharge, odor, etc.   Will follow up with Urology as outpatient  Follow up with PCP at discharge as well  May need referral to Nephrology if hematuria persists to rule out  IgAN cirrhosis related  Vital signs are stable     Discussed the following with patient, Dr. Ramirez and RN. Patient agreeable to plan and voices no immediate barriers to following up with outpatient treatment plan.     MEDICINE TO SIGN OFF  CALL FOR ACUTE NEEDS  Pt expected to discharge later today    Time spent  26  minutes obtaining labs, imaging, recommendations, interview, assessment, examination, medication review/ordering, and EMR review.    Plan of care was discussed extensively with patient. Patient verbalized understanding through teach back method. All questions and concerns addressed upon examination.     Of note, this documentation is completed using the Dragon Dictation system (voice recognition software). There may be spelling and/or grammatical errors that were not corrected prior to final submission.      Principal Problem:    Mental disorder                  Kristen Maxwell, APRN-CNP

## 2024-03-15 NOTE — NURSING NOTE
Discharge instructions reviewed verbally and in writing including f/u appointments and medications. Patient verbalizes understanding and signed as such. All belongings returned for discharge. Patient denies SI, HI, A/V hallucinations, mood is stable.

## 2024-03-15 NOTE — CARE PLAN
The patient's goals for the shift include sleep through the night    The clinical goals for the shift include Sleep through the night    Over the shift, the patient did not make progress toward the following goals. Patient slept throughout the night.

## 2024-03-17 LAB
ATRIAL RATE: 62 BPM
P AXIS: -1 DEGREES
P OFFSET: 212 MS
P ONSET: 156 MS
PR INTERVAL: 136 MS
Q ONSET: 224 MS
QRS COUNT: 10 BEATS
QRS DURATION: 90 MS
QT INTERVAL: 430 MS
QTC CALCULATION(BAZETT): 436 MS
QTC FREDERICIA: 435 MS
R AXIS: 49 DEGREES
T AXIS: 36 DEGREES
T OFFSET: 439 MS
VENTRICULAR RATE: 62 BPM

## 2024-04-01 ENCOUNTER — HOSPITAL ENCOUNTER (INPATIENT)
Age: 29
LOS: 1 days | Discharge: LEFT BEFORE DISCHARGE | DRG: 284 | End: 2024-04-02
Attending: STUDENT IN AN ORGANIZED HEALTH CARE EDUCATION/TRAINING PROGRAM | Admitting: FAMILY MEDICINE
Payer: MEDICAID

## 2024-04-01 DIAGNOSIS — F10.939 ALCOHOL WITHDRAWAL SYNDROME WITH COMPLICATION (HCC): ICD-10-CM

## 2024-04-01 DIAGNOSIS — K81.9 CHOLECYSTITIS: Primary | ICD-10-CM

## 2024-04-01 DIAGNOSIS — F10.920 ACUTE ALCOHOLIC INTOXICATION WITHOUT COMPLICATION (HCC): ICD-10-CM

## 2024-04-01 PROCEDURE — 80143 DRUG ASSAY ACETAMINOPHEN: CPT

## 2024-04-01 PROCEDURE — 83690 ASSAY OF LIPASE: CPT

## 2024-04-01 PROCEDURE — 82248 BILIRUBIN DIRECT: CPT

## 2024-04-01 PROCEDURE — 96375 TX/PRO/DX INJ NEW DRUG ADDON: CPT

## 2024-04-01 PROCEDURE — 96365 THER/PROPH/DIAG IV INF INIT: CPT

## 2024-04-01 PROCEDURE — G0480 DRUG TEST DEF 1-7 CLASSES: HCPCS

## 2024-04-01 PROCEDURE — 80053 COMPREHEN METABOLIC PANEL: CPT

## 2024-04-01 PROCEDURE — 80307 DRUG TEST PRSMV CHEM ANLYZR: CPT

## 2024-04-01 PROCEDURE — 99285 EMERGENCY DEPT VISIT HI MDM: CPT

## 2024-04-01 PROCEDURE — 83735 ASSAY OF MAGNESIUM: CPT

## 2024-04-01 PROCEDURE — 85025 COMPLETE CBC W/AUTO DIFF WBC: CPT

## 2024-04-01 PROCEDURE — 80179 DRUG ASSAY SALICYLATE: CPT

## 2024-04-01 RX ORDER — FOLIC ACID 1 MG/1
1 TABLET ORAL DAILY
COMMUNITY

## 2024-04-01 RX ORDER — QUETIAPINE FUMARATE 100 MG/1
100 TABLET, FILM COATED ORAL NIGHTLY
COMMUNITY

## 2024-04-01 RX ORDER — LORAZEPAM 2 MG/ML
3 INJECTION INTRAMUSCULAR
Status: DISCONTINUED | OUTPATIENT
Start: 2024-04-01 | End: 2024-04-02 | Stop reason: HOSPADM

## 2024-04-01 RX ORDER — CHOLECALCIFEROL (VITAMIN D3) 125 MCG
500 CAPSULE ORAL DAILY
COMMUNITY

## 2024-04-01 RX ORDER — LORAZEPAM 1 MG/1
2 TABLET ORAL
Status: DISCONTINUED | OUTPATIENT
Start: 2024-04-01 | End: 2024-04-02 | Stop reason: HOSPADM

## 2024-04-01 RX ORDER — CLONIDINE HYDROCHLORIDE 0.1 MG/1
0.1 TABLET ORAL 3 TIMES DAILY
COMMUNITY

## 2024-04-01 RX ORDER — SODIUM CHLORIDE 0.9 % (FLUSH) 0.9 %
5-40 SYRINGE (ML) INJECTION PRN
Status: DISCONTINUED | OUTPATIENT
Start: 2024-04-01 | End: 2024-04-02 | Stop reason: HOSPADM

## 2024-04-01 RX ORDER — ARIPIPRAZOLE 5 MG/1
5 TABLET ORAL DAILY
COMMUNITY

## 2024-04-01 RX ORDER — HYDROXYZINE PAMOATE 25 MG/1
25 CAPSULE ORAL 3 TIMES DAILY PRN
COMMUNITY

## 2024-04-01 RX ORDER — THIAMINE MONONITRATE (VIT B1) 100 MG
300 TABLET ORAL DAILY
COMMUNITY

## 2024-04-01 RX ORDER — SODIUM CHLORIDE 0.9 % (FLUSH) 0.9 %
5-40 SYRINGE (ML) INJECTION EVERY 12 HOURS SCHEDULED
Status: DISCONTINUED | OUTPATIENT
Start: 2024-04-02 | End: 2024-04-02 | Stop reason: HOSPADM

## 2024-04-01 RX ORDER — ERGOCALCIFEROL 1.25 MG/1
50000 CAPSULE ORAL WEEKLY
COMMUNITY

## 2024-04-01 RX ORDER — LORAZEPAM 1 MG/1
4 TABLET ORAL
Status: DISCONTINUED | OUTPATIENT
Start: 2024-04-01 | End: 2024-04-02 | Stop reason: HOSPADM

## 2024-04-01 RX ORDER — LORAZEPAM 1 MG/1
3 TABLET ORAL
Status: DISCONTINUED | OUTPATIENT
Start: 2024-04-01 | End: 2024-04-02 | Stop reason: HOSPADM

## 2024-04-01 RX ORDER — LORAZEPAM 2 MG/ML
1 INJECTION INTRAMUSCULAR
Status: DISCONTINUED | OUTPATIENT
Start: 2024-04-01 | End: 2024-04-02 | Stop reason: HOSPADM

## 2024-04-01 RX ORDER — GAUZE BANDAGE 2" X 2"
100 BANDAGE TOPICAL DAILY
Status: DISCONTINUED | OUTPATIENT
Start: 2024-04-02 | End: 2024-04-02 | Stop reason: HOSPADM

## 2024-04-01 RX ORDER — M-VIT,TX,IRON,MINS/CALC/FOLIC 27MG-0.4MG
1 TABLET ORAL DAILY
COMMUNITY

## 2024-04-01 RX ORDER — LORAZEPAM 2 MG/ML
2 INJECTION INTRAMUSCULAR
Status: DISCONTINUED | OUTPATIENT
Start: 2024-04-01 | End: 2024-04-02 | Stop reason: HOSPADM

## 2024-04-01 RX ORDER — SODIUM CHLORIDE 9 MG/ML
INJECTION, SOLUTION INTRAVENOUS PRN
Status: DISCONTINUED | OUTPATIENT
Start: 2024-04-01 | End: 2024-04-02 | Stop reason: HOSPADM

## 2024-04-01 RX ORDER — LORAZEPAM 1 MG/1
1 TABLET ORAL
Status: DISCONTINUED | OUTPATIENT
Start: 2024-04-01 | End: 2024-04-02 | Stop reason: HOSPADM

## 2024-04-01 RX ORDER — LORAZEPAM 2 MG/ML
4 INJECTION INTRAMUSCULAR
Status: DISCONTINUED | OUTPATIENT
Start: 2024-04-01 | End: 2024-04-02 | Stop reason: HOSPADM

## 2024-04-01 ASSESSMENT — LIFESTYLE VARIABLES
HOW MANY STANDARD DRINKS CONTAINING ALCOHOL DO YOU HAVE ON A TYPICAL DAY: 5 OR 6
HOW OFTEN DO YOU HAVE A DRINK CONTAINING ALCOHOL: 4 OR MORE TIMES A WEEK

## 2024-04-02 ENCOUNTER — APPOINTMENT (OUTPATIENT)
Dept: CT IMAGING | Age: 29
DRG: 284 | End: 2024-04-02
Payer: MEDICAID

## 2024-04-02 VITALS
TEMPERATURE: 97.5 F | HEIGHT: 72 IN | BODY MASS INDEX: 24.38 KG/M2 | WEIGHT: 180 LBS | DIASTOLIC BLOOD PRESSURE: 89 MMHG | SYSTOLIC BLOOD PRESSURE: 117 MMHG | OXYGEN SATURATION: 100 % | HEART RATE: 87 BPM | RESPIRATION RATE: 21 BRPM

## 2024-04-02 PROBLEM — Z78.9 ALCOHOL USE: Status: ACTIVE | Noted: 2024-04-02

## 2024-04-02 PROBLEM — F12.90 MARIJUANA USE: Status: ACTIVE | Noted: 2024-04-02

## 2024-04-02 PROBLEM — K81.9 CHOLECYSTITIS: Status: ACTIVE | Noted: 2024-04-02

## 2024-04-02 PROBLEM — K81.0 ACUTE CHOLECYSTITIS: Status: ACTIVE | Noted: 2024-04-02

## 2024-04-02 LAB
ALBUMIN SERPL-MCNC: 3.9 G/DL (ref 3.5–5.2)
ALP SERPL-CCNC: 144 U/L (ref 40–129)
ALT SERPL-CCNC: 81 U/L (ref 0–40)
AMPHET UR QL SCN: NEGATIVE
ANION GAP SERPL CALCULATED.3IONS-SCNC: 11 MMOL/L (ref 7–16)
APAP SERPL-MCNC: <5 UG/ML (ref 10–30)
AST SERPL-CCNC: 76 U/L (ref 0–39)
BARBITURATES UR QL SCN: NEGATIVE
BASOPHILS # BLD: 0.04 K/UL (ref 0–0.2)
BASOPHILS NFR BLD: 0 % (ref 0–2)
BENZODIAZ UR QL: NEGATIVE
BILIRUB DIRECT SERPL-MCNC: 0.3 MG/DL (ref 0–0.3)
BILIRUB INDIRECT SERPL-MCNC: 0.5 MG/DL (ref 0–1)
BILIRUB SERPL-MCNC: 0.8 MG/DL (ref 0–1.2)
BUN SERPL-MCNC: 13 MG/DL (ref 6–20)
BUPRENORPHINE UR QL: NEGATIVE
CALCIUM SERPL-MCNC: 8.9 MG/DL (ref 8.6–10.2)
CANNABINOIDS UR QL SCN: POSITIVE
CHLORIDE SERPL-SCNC: 105 MMOL/L (ref 98–107)
CO2 SERPL-SCNC: 23 MMOL/L (ref 22–29)
COCAINE UR QL SCN: NEGATIVE
CREAT SERPL-MCNC: 1 MG/DL (ref 0.7–1.2)
EOSINOPHIL # BLD: 0.22 K/UL (ref 0.05–0.5)
EOSINOPHILS RELATIVE PERCENT: 2 % (ref 0–6)
ERYTHROCYTE [DISTWIDTH] IN BLOOD BY AUTOMATED COUNT: 15 % (ref 11.5–15)
ETHANOLAMINE SERPL-MCNC: 192 MG/DL
ETHANOLAMINE SERPL-MCNC: 46 MG/DL
FENTANYL UR QL: NEGATIVE
GFR SERPL CREATININE-BSD FRML MDRD: >90 ML/MIN/1.73M2
GLUCOSE SERPL-MCNC: 101 MG/DL (ref 74–99)
HCT VFR BLD AUTO: 37.4 % (ref 37–54)
HGB BLD-MCNC: 13.5 G/DL (ref 12.5–16.5)
IMM GRANULOCYTES # BLD AUTO: 0.03 K/UL (ref 0–0.58)
IMM GRANULOCYTES NFR BLD: 0 % (ref 0–5)
INR PPP: 1.3
LIPASE SERPL-CCNC: 81 U/L (ref 13–60)
LYMPHOCYTES NFR BLD: 2.78 K/UL (ref 1.5–4)
LYMPHOCYTES RELATIVE PERCENT: 29 % (ref 20–42)
MAGNESIUM SERPL-MCNC: 2 MG/DL (ref 1.6–2.6)
MCH RBC QN AUTO: 33.7 PG (ref 26–35)
MCHC RBC AUTO-ENTMCNC: 36.1 G/DL (ref 32–34.5)
MCV RBC AUTO: 93.3 FL (ref 80–99.9)
METHADONE UR QL: NEGATIVE
MONOCYTES NFR BLD: 1.02 K/UL (ref 0.1–0.95)
MONOCYTES NFR BLD: 11 % (ref 2–12)
NEUTROPHILS NFR BLD: 58 % (ref 43–80)
NEUTS SEG NFR BLD: 5.66 K/UL (ref 1.8–7.3)
OPIATES UR QL SCN: NEGATIVE
OXYCODONE UR QL SCN: NEGATIVE
PCP UR QL SCN: NEGATIVE
PLATELET # BLD AUTO: 103 K/UL (ref 130–450)
PMV BLD AUTO: 12.7 FL (ref 7–12)
POTASSIUM SERPL-SCNC: 4.1 MMOL/L (ref 3.5–5)
PROT SERPL-MCNC: 7.3 G/DL (ref 6.4–8.3)
PROTHROMBIN TIME: 14.9 SEC (ref 9.3–12.4)
RBC # BLD AUTO: 4.01 M/UL (ref 3.8–5.8)
SALICYLATES SERPL-MCNC: <0.3 MG/DL (ref 0–30)
SODIUM SERPL-SCNC: 139 MMOL/L (ref 132–146)
TEST INFORMATION: ABNORMAL
TOXIC TRICYCLIC SC,BLOOD: NEGATIVE
WBC OTHER # BLD: 9.8 K/UL (ref 4.5–11.5)

## 2024-04-02 PROCEDURE — 2580000003 HC RX 258

## 2024-04-02 PROCEDURE — 6360000002 HC RX W HCPCS: Performed by: FAMILY MEDICINE

## 2024-04-02 PROCEDURE — 36415 COLL VENOUS BLD VENIPUNCTURE: CPT

## 2024-04-02 PROCEDURE — 1200000000 HC SEMI PRIVATE

## 2024-04-02 PROCEDURE — 99239 HOSP IP/OBS DSCHRG MGMT >30: CPT | Performed by: STUDENT IN AN ORGANIZED HEALTH CARE EDUCATION/TRAINING PROGRAM

## 2024-04-02 PROCEDURE — 2580000003 HC RX 258: Performed by: FAMILY MEDICINE

## 2024-04-02 PROCEDURE — 2580000003 HC RX 258: Performed by: STUDENT IN AN ORGANIZED HEALTH CARE EDUCATION/TRAINING PROGRAM

## 2024-04-02 PROCEDURE — 6360000002 HC RX W HCPCS: Performed by: STUDENT IN AN ORGANIZED HEALTH CARE EDUCATION/TRAINING PROGRAM

## 2024-04-02 PROCEDURE — 80307 DRUG TEST PRSMV CHEM ANLYZR: CPT

## 2024-04-02 PROCEDURE — 74177 CT ABD & PELVIS W/CONTRAST: CPT

## 2024-04-02 PROCEDURE — HZ2ZZZZ DETOXIFICATION SERVICES FOR SUBSTANCE ABUSE TREATMENT: ICD-10-PCS | Performed by: FAMILY MEDICINE

## 2024-04-02 PROCEDURE — 6360000004 HC RX CONTRAST MEDICATION: Performed by: RADIOLOGY

## 2024-04-02 PROCEDURE — 99223 1ST HOSP IP/OBS HIGH 75: CPT | Performed by: FAMILY MEDICINE

## 2024-04-02 PROCEDURE — 85610 PROTHROMBIN TIME: CPT

## 2024-04-02 RX ORDER — SODIUM CHLORIDE 0.9 % (FLUSH) 0.9 %
5-40 SYRINGE (ML) INJECTION EVERY 12 HOURS SCHEDULED
Status: DISCONTINUED | OUTPATIENT
Start: 2024-04-02 | End: 2024-04-02 | Stop reason: HOSPADM

## 2024-04-02 RX ORDER — DIPHENHYDRAMINE HYDROCHLORIDE 50 MG/ML
50 INJECTION INTRAMUSCULAR; INTRAVENOUS ONCE
Status: COMPLETED | OUTPATIENT
Start: 2024-04-02 | End: 2024-04-02

## 2024-04-02 RX ORDER — SODIUM CHLORIDE, SODIUM LACTATE, POTASSIUM CHLORIDE, CALCIUM CHLORIDE 600; 310; 30; 20 MG/100ML; MG/100ML; MG/100ML; MG/100ML
INJECTION, SOLUTION INTRAVENOUS
Status: COMPLETED
Start: 2024-04-02 | End: 2024-04-02

## 2024-04-02 RX ORDER — METRONIDAZOLE 500 MG/100ML
500 INJECTION, SOLUTION INTRAVENOUS ONCE
Status: COMPLETED | OUTPATIENT
Start: 2024-04-02 | End: 2024-04-02

## 2024-04-02 RX ORDER — 0.9 % SODIUM CHLORIDE 0.9 %
500 INTRAVENOUS SOLUTION INTRAVENOUS ONCE
Status: COMPLETED | OUTPATIENT
Start: 2024-04-02 | End: 2024-04-02

## 2024-04-02 RX ORDER — SODIUM CHLORIDE, SODIUM LACTATE, POTASSIUM CHLORIDE, CALCIUM CHLORIDE 600; 310; 30; 20 MG/100ML; MG/100ML; MG/100ML; MG/100ML
INJECTION, SOLUTION INTRAVENOUS CONTINUOUS
Status: DISCONTINUED | OUTPATIENT
Start: 2024-04-02 | End: 2024-04-02 | Stop reason: HOSPADM

## 2024-04-02 RX ORDER — METRONIDAZOLE 500 MG/100ML
500 INJECTION, SOLUTION INTRAVENOUS EVERY 8 HOURS
Status: DISCONTINUED | OUTPATIENT
Start: 2024-04-02 | End: 2024-04-02 | Stop reason: HOSPADM

## 2024-04-02 RX ORDER — SODIUM CHLORIDE 9 MG/ML
INJECTION, SOLUTION INTRAVENOUS
Status: DISCONTINUED
Start: 2024-04-02 | End: 2024-04-02 | Stop reason: HOSPADM

## 2024-04-02 RX ORDER — METOCLOPRAMIDE HYDROCHLORIDE 5 MG/ML
10 INJECTION INTRAMUSCULAR; INTRAVENOUS ONCE
Status: COMPLETED | OUTPATIENT
Start: 2024-04-02 | End: 2024-04-02

## 2024-04-02 RX ORDER — SODIUM CHLORIDE 9 MG/ML
INJECTION, SOLUTION INTRAVENOUS PRN
Status: DISCONTINUED | OUTPATIENT
Start: 2024-04-02 | End: 2024-04-02 | Stop reason: HOSPADM

## 2024-04-02 RX ORDER — ACETAMINOPHEN 650 MG/1
650 SUPPOSITORY RECTAL EVERY 6 HOURS PRN
Status: DISCONTINUED | OUTPATIENT
Start: 2024-04-02 | End: 2024-04-02 | Stop reason: HOSPADM

## 2024-04-02 RX ORDER — SODIUM CHLORIDE 0.9 % (FLUSH) 0.9 %
5-40 SYRINGE (ML) INJECTION PRN
Status: DISCONTINUED | OUTPATIENT
Start: 2024-04-02 | End: 2024-04-02 | Stop reason: HOSPADM

## 2024-04-02 RX ORDER — ENOXAPARIN SODIUM 100 MG/ML
40 INJECTION SUBCUTANEOUS DAILY
Status: DISCONTINUED | OUTPATIENT
Start: 2024-04-02 | End: 2024-04-02 | Stop reason: HOSPADM

## 2024-04-02 RX ORDER — KETOROLAC TROMETHAMINE 30 MG/ML
30 INJECTION, SOLUTION INTRAMUSCULAR; INTRAVENOUS ONCE
Status: COMPLETED | OUTPATIENT
Start: 2024-04-02 | End: 2024-04-02

## 2024-04-02 RX ORDER — ACETAMINOPHEN 325 MG/1
650 TABLET ORAL EVERY 6 HOURS PRN
Status: DISCONTINUED | OUTPATIENT
Start: 2024-04-02 | End: 2024-04-02 | Stop reason: HOSPADM

## 2024-04-02 RX ADMIN — SODIUM CHLORIDE, POTASSIUM CHLORIDE, SODIUM LACTATE AND CALCIUM CHLORIDE: 600; 310; 30; 20 INJECTION, SOLUTION INTRAVENOUS at 03:00

## 2024-04-02 RX ADMIN — IOPAMIDOL 80 ML: 755 INJECTION, SOLUTION INTRAVENOUS at 00:49

## 2024-04-02 RX ADMIN — SODIUM CHLORIDE 500 ML: 9 INJECTION, SOLUTION INTRAVENOUS at 04:00

## 2024-04-02 RX ADMIN — METRONIDAZOLE 500 MG: 500 INJECTION, SOLUTION INTRAVENOUS at 10:15

## 2024-04-02 RX ADMIN — CEFTRIAXONE SODIUM 2000 MG: 2 INJECTION, POWDER, FOR SOLUTION INTRAMUSCULAR; INTRAVENOUS at 02:11

## 2024-04-02 RX ADMIN — METOCLOPRAMIDE 10 MG: 5 INJECTION, SOLUTION INTRAMUSCULAR; INTRAVENOUS at 05:30

## 2024-04-02 RX ADMIN — KETOROLAC TROMETHAMINE 30 MG: 30 INJECTION INTRAMUSCULAR; INTRAVENOUS at 03:16

## 2024-04-02 RX ADMIN — LORAZEPAM 4 MG: 2 INJECTION INTRAMUSCULAR; INTRAVENOUS at 05:30

## 2024-04-02 RX ADMIN — LORAZEPAM 2 MG: 2 INJECTION INTRAMUSCULAR; INTRAVENOUS at 03:16

## 2024-04-02 RX ADMIN — SODIUM CHLORIDE, POTASSIUM CHLORIDE, SODIUM LACTATE AND CALCIUM CHLORIDE: 600; 310; 30; 20 INJECTION, SOLUTION INTRAVENOUS at 10:11

## 2024-04-02 RX ADMIN — SODIUM CHLORIDE, PRESERVATIVE FREE 10 ML: 5 INJECTION INTRAVENOUS at 10:16

## 2024-04-02 RX ADMIN — DIPHENHYDRAMINE HYDROCHLORIDE 50 MG: 50 INJECTION INTRAMUSCULAR; INTRAVENOUS at 03:16

## 2024-04-02 RX ADMIN — METRONIDAZOLE 500 MG: 500 INJECTION, SOLUTION INTRAVENOUS at 02:15

## 2024-04-02 ASSESSMENT — PAIN DESCRIPTION - DESCRIPTORS: DESCRIPTORS: SHARP

## 2024-04-02 ASSESSMENT — PAIN DESCRIPTION - ORIENTATION: ORIENTATION: RIGHT;LEFT

## 2024-04-02 ASSESSMENT — PAIN SCALES - GENERAL: PAINLEVEL_OUTOF10: 6

## 2024-04-02 ASSESSMENT — PAIN DESCRIPTION - FREQUENCY: FREQUENCY: CONTINUOUS

## 2024-04-02 ASSESSMENT — PAIN DESCRIPTION - LOCATION: LOCATION: CHEST;LEG;ABDOMEN

## 2024-04-02 NOTE — H&P
Provider, MD Arya   folic acid (FOLVITE) 1 MG tablet Take 1 tablet by mouth daily   Yes Arya Silva MD   Multiple Vitamins-Minerals (THERAPEUTIC MULTIVITAMIN-MINERALS) tablet Take 1 tablet by mouth daily   Yes Arya Silva MD   vitamin B-1 (THIAMINE) 100 MG tablet Take 3 tablets by mouth daily   Yes Arya Silva MD   vitamin B-12 (CYANOCOBALAMIN) 500 MCG tablet Take 1 tablet by mouth daily   Yes Arya Silva MD   vitamin D (ERGOCALCIFEROL) 1.25 MG (51494 UT) CAPS capsule Take 1 capsule by mouth once a week   Yes Arya Silva MD       Allergies:    Patient has no known allergies.    Social History:    reports that he has been smoking cigarettes. He has never used smokeless tobacco. He reports current alcohol use of about 5.0 standard drinks of alcohol per week. He reports current drug use. Drug: Marijuana (Weed).      Family History:   family history is not on file.     PHYSICAL EXAM:  Vitals:  /66   Pulse 82   Temp 98.1 °F (36.7 °C)   Resp 14   Ht 1.829 m (6')   Wt 81.6 kg (180 lb)   SpO2 95%   BMI 24.41 kg/m²     Constitutional:  NAD, awake, alert  Eyes: no scleral icterus, normal lids, no discharge  ENMT:  Normocephalic, atraumatic, mucosa moist, EOMI  Neck:  trachea midline, no JVD  Lungs:  CTA bilaterally, no audible rhonchi or wheezes noted, respirations unlabored, no retractions  Heart::  RRR, distant heart tones, no murmur, rub, or gallop noted during exam  Abd:  Soft, RUQ tender, non distended, bowel sounds present  :  deferred  MSK: sarcopenia present  Ext:  Moving all extremities, no edema, pulses present  Skin:  Warm and dry, no rashes on visible skin  Psych: non-anxious affect  Neuro:  PERRL, Alert, grossly nonfocal; following commands    LABS:  Recent Labs     04/01/24  2357      K 4.1      CO2 23   BUN 13   CREATININE 1.0   GLUCOSE 101*   CALCIUM 8.9       Recent Labs     04/01/24  2357   WBC 9.8   RBC 4.01   HGB 13.5   HCT  37.4   MCV 93.3   MCH 33.7   MCHC 36.1*   RDW 15.0   *   MPV 12.7*       No results for input(s): \"POCGLU\" in the last 72 hours.      Radiology: CT ABDOMEN PELVIS W IV CONTRAST Additional Contrast? None    Result Date: 4/2/2024  EXAMINATION: CT OF THE ABDOMEN AND PELVIS WITH CONTRAST 4/2/2024 12:51 am TECHNIQUE: CT of the abdomen and pelvis was performed with the administration of intravenous contrast. Multiplanar reformatted images are provided for review. Automated exposure control, iterative reconstruction, and/or weight based adjustment of the mA/kV was utilized to reduce the radiation dose to as low as reasonably achievable. COMPARISON: None. HISTORY: ORDERING SYSTEM PROVIDED HISTORY: ruq abd pain TECHNOLOGIST PROVIDED HISTORY: Reason for exam:->ruq abd pain Additional Contrast?->None Decision Support Exception - unselect if not a suspected or confirmed emergency medical condition->Emergency Medical Condition (MA) FINDINGS: Lower Chest:  Visualized portion of the lower chest demonstrates no acute abnormality. Organs: The liver, spleen, pancreas, adrenals, and kidneys are unremarkable. There is cholelithiasis.  There is pericholecystic fluid. GI/Bowel: There is no evidence of bowel obstruction.  No evidence of abnormal bowel wall thickening or distension. The appendix is normal. Pelvis: The urinary bladder is partially filled. The prostate is unremarkable. Peritoneum/Retroperitoneum: No extraluminal gas.  No evidence of lymphadenopathy.  Aorta is normal in caliber. Bones/Soft Tissues:  No acute abnormality of the visualized osseous structures.     Findings concerning for acute cholecystitis.     XR CHEST (2 VW)    Result Date: 4/1/2024  EXAMINATION: XR CHEST PA+LAT 2 VIEWS 04/01/2024 06:30 PM CLINICAL HISTORY: Chest trauma, blunt; fall ASSOCIATED DIAGNOSIS: Chest trauma, blunt fall ORDERING PROVIDER: PEREZ HORTA TECHNOLOGISTS NOTE: COMPARISON: None FINDINGS: Cardiomediastinal silhouette: Normal.

## 2024-04-02 NOTE — CONSULTS
GENERAL SURGERY  CONSULT NOTE  4/2/2024    Physician Consulted: Dr. Carney  Reason for Consult: cholecystitis   Referring Physician: Dr. amparo MCDANIEL  Justo Chester is a 29 y.o. male who presented to the hospital wanting to detox from alcohol.  He states he drinks multiple tall boys daily.  He has attempted to stop drinking multiple times in the past and has an extensive withdrawal history but denies seizures.  He has a history of cirrhosis caused by both alcohol and hepatitis C.  In August and September of last year he was treated for SBP and had an EGD at that time that revealed esophageal varices.  He underwent banding and was told to follow-up in 4 months for repeat EGD but he never did.  He states he has had some chronic epigastric/right upper quadrant pain associated with right shoulder pain for many months.  It appears as though he had a gallbladder workup in [place] when admitted for the SBP that revealed a thickened gallbladder on CT abdomen.  He had a HIDA scan at that time that was completely normal.    CT at our facility revealed the same thickened gallbladder wall with some pericholecystic fluid.  He is not complaining of abdominal pain currently and he is nontender on exam.  He is afebrile and hemodynamically normal.  Lab work is remarkable for slight transaminitis with ALT 81 and AST 76.  Bilirubin is normal.  INR was not ordered so we cannot calculate a MELD at this time.      Past Medical History:   Diagnosis Date    Anxiety     Depression        Past Surgical History:   Procedure Laterality Date    SKIN BIOPSY         Medications Prior to Admission:    Prior to Admission medications    Medication Sig Start Date End Date Taking? Authorizing Provider   ARIPiprazole (ABILIFY) 5 MG tablet Take 1 tablet by mouth daily   Yes ProviderArya MD   cloNIDine (CATAPRES) 0.1 MG tablet Take 1 tablet by mouth in the morning, at noon, and at bedtime   Yes ProviderArya MD   hydrOXYzine pamoate  No extraluminal gas.  No evidence of lymphadenopathy.  Aorta is normal in caliber. Bones/Soft Tissues:  No acute abnormality of the visualized osseous structures.     Findings concerning for acute cholecystitis.     XR CHEST (2 VW)    Result Date: 4/1/2024  EXAMINATION: XR CHEST PA+LAT 2 VIEWS 04/01/2024 06:30 PM CLINICAL HISTORY: Chest trauma, blunt; fall ASSOCIATED DIAGNOSIS: Chest trauma, blunt fall ORDERING PROVIDER: PEREZ HORTA TECHNOLOGISTS NOTE: COMPARISON: None FINDINGS: Cardiomediastinal silhouette: Normal. Lungs/Pleura: No focal pulmonary consolidation, effusion or pneumothorax. Musculoskeletal: Unremarkable. IMPRESSION: No radiographically apparent acute abnormality. MACRO: None        ASSESSMENT:  29 y.o. male with current alcohol abuse who presented to attempt to\"detox\".  History of cirrhosis with moderate ascites and esophageal varices s/p banding at outside facility.    PLAN:  -Repeat HIDA scan  -N.p.o.  -Alcohol withdrawal management per primary service    Discussed with Dr. Carney    Electronically signed by Cristal Davis MD on 4/2/24 at 7:42 AM EDT      General Surgery Assessment/Plan Attestation  [facility]  Luis Miguel Carney MD, MS, FACS, [facility]    Patient's Name/Date of Birth: Justo Chester / 1995    Date: April 2, 2024     Surgeon: MD Rommel    Requesting Physician: Discussed with consulting physician and nursing, family and specialists    Chief Complaint: RUQ pain    Assessment/Plan:  Justo Chester is a 29 y.o. male with RUQ pain, etoh abuse  Patient Active Problem List   Diagnosis    Acute cholecystitis    Marijuana use    Alcohol use       HIDA eval cholecystitis   PPI  WBC, LFT, BMP monitor  Ok for diet if hida neg for cystic obstruction    I have personally participated in a face-to-face history and physical exam on the date of service. Reviewed chart, vitals, labs and radiologic studies.  I also participated in medical decision making

## 2024-04-02 NOTE — CARE COORDINATION
SS DC PLANNING: SOPHIA notified by nursing that pt is wanting to sign out AMA so he can return to [facility]. SOPHIA contacted Audrey at [facility]. Per [facility], pt arrived at their facility last night after their nurse left for the night. Therefore, they sent the pt to ER for detox. Per [facility], they will accept pt back and will have a  available to  around 12-1230p on this date. Per [facility], they do not need any clinicals faxed to them. Pt and charge nurse was notified of  time.

## 2024-04-02 NOTE — DISCHARGE SUMMARY
[facility]   IN-PATIENT SERVICE   [facility]    Discharge Summary     Patient ID: Justo Chester  :  1995   MRN: [id number]     ACCOUNT:  [id number]   Patient's PCP: No primary care provider on file.  Admit Date: 2024   Discharge Date: 2024     Length of Stay: 1  Code Status:  Prior  Admitting Physician: Yisel Delgado DO  Discharge Physician: Dominga Holland MD     Active Discharge Diagnoses:     Hospital Problem Lists:  Principal Problem:    Acute cholecystitis  Active Problems:    Marijuana use    Alcohol use  Resolved Problems:    * No resolved hospital problems. *      Admission Condition:  fair     Discharged Condition: fair    Hospital Stay:     Hospital Course:    29 y.o. male with a history of ETOH presents with RUQ pain.  States he went to [facility] because he wants to stop drinking alcohol, and was told to go to the ED for evaluation.  Drinks about 4 tall boys daily.  He has had intermittent RUQ cramping pain for the past month as well.  Workup in ED significant for alk phos 144, ALT 81, AST 76, ethanol 192.  UDS cannabinoid positive.  CT abd/pelvis concerning for acute cholecystitis.  Surgery c/s from ED. Given Rocephin/Flagyl in ED and CIWA protocol started.     Chart review shows patient was seen at [place] ED yesterday for a fall with right sided pain, SOB, and had left a rehab center yesterday and wanted to go back.  CXR was done with no abnormalities and patient d/c at .      Significant therapeutic interventions:     Significant Diagnostic Studies:   Labs / Micro:  CBC:   Lab Results   Component Value Date/Time    WBC 9.8 2024 11:57 PM    RBC 4.01 2024 11:57 PM    HGB 13.5 2024 11:57 PM    HCT 37.4 2024 11:57 PM    MCV 93.3 2024 11:57 PM    MCH 33.7 2024 11:57 PM    MCHC 36.1 2024 11:57 PM    RDW 15.0 2024 11:57 PM     2024 11:57 PM     BMP:    Lab Results

## 2024-04-02 NOTE — ED PROVIDER NOTES
Department of Emergency Medicine   ED  Provider Note  Admit Date/RoomTime: 4/1/2024 11:42 PM  ED Room: 03/03          History of Present Illness:    4/1/24, Time: 11:50 PM EDT         Justo Chester is a 29 y.o. male presenting to the ED for complaint of needing to detox from alcohol.  Does have a history of alcoholism.  States he drinks every day about 5-6 tall boys daily last drink was approximately 45 minutes ago has had alcohol withdrawals in the past denies any alcohol withdrawal seizures.  States he went to a facility for detox however was sent in here to have alcohol detoxification.  He denies any chest pain or shortness Arther abdominal pain denies any fever chills denies any hallucinations otherwise no other acute complaints this time.    Review of Systems:   Pertinent positives and negatives are stated within HPI, all other systems reviewed and are negative.        --------------------------------------------- PAST HISTORY ---------------------------------------------  Past Medical History:  has a past medical history of Anxiety and Depression.    Past Surgical History:  has a past surgical history that includes skin biopsy.    Social History:  reports that he has been smoking cigarettes. He has never used smokeless tobacco. He reports current alcohol use of about 5.0 standard drinks of alcohol per week. He reports current drug use. Drug: Marijuana (Weed).    Family History: family history is not on file.     The patient’s home medications have been reviewed.    Allergies: Patient has no known allergies.        ---------------------------------------------------PHYSICAL EXAM--------------------------------------    Constitutional/General: Alert and oriented x3  Head: Normocephalic and atraumatic  Eyes: PERRL, EOMI, conjunctiva normal, sclera non icteric  Mouth: Oropharynx clear, handling secretions,   Neck: Supple, full ROM, no stridor, no meningeal signs  Respiratory: Lungs clear to auscultation  and complex medical decision making and emergency management    This patient has remained hemodynamically stable during their ED course.          Counseling:   The emergency provider has spoken with the patient and discussed today’s results, in addition to providing specific details for the plan of care and counseling regarding the diagnosis and prognosis.  Questions are answered at this time and they are agreeable with the plan.       --------------------------------- IMPRESSION AND DISPOSITION ---------------------------------    IMPRESSION  1. Cholecystitis    2. Alcohol withdrawal syndrome with complication (HCC)    3. Acute alcoholic intoxication without complication (HCC)        DISPOSITION  Disposition: To telemetry  Patient condition is stable        NOTE: This report was transcribed using voice recognition software. Every effort was made to ensure accuracy; however, inadvertent computerized transcription errors may be present       Alexis Blanco MD  04/02/24 0156

## 2024-04-02 NOTE — PROGRESS NOTES
Heart monitor removed from patient and placed at nurses station.   
Patient is leaving AMA. States he needs to go pay his phone bill and that he will have a ride to [place] at noon. Dr. Holland and Dr. Carney made aware. IV out and monitor off. Paper signed in chart.   
Antonio Lopez.  Ophthalmology  [address] [place] [address]  Phone: [phone]  Fax: [phone]  Follow Up Time: 1-3 Days

## 2024-04-03 ENCOUNTER — HOSPITAL ENCOUNTER (EMERGENCY)
Age: 29
Discharge: HOME | End: 2024-04-03
Attending: EMERGENCY MEDICINE
Payer: MEDICAID

## 2024-04-03 ENCOUNTER — APPOINTMENT (OUTPATIENT)
Dept: ULTRASOUND IMAGING | Age: 29
End: 2024-04-03
Payer: MEDICAID

## 2024-04-03 VITALS
SYSTOLIC BLOOD PRESSURE: 140 MMHG | RESPIRATION RATE: 18 BRPM | TEMPERATURE: 98.6 F | OXYGEN SATURATION: 98 % | DIASTOLIC BLOOD PRESSURE: 82 MMHG | HEART RATE: 79 BPM

## 2024-04-03 DIAGNOSIS — K80.20 GALL STONES: Primary | ICD-10-CM

## 2024-04-03 LAB
ALBUMIN SERPL-MCNC: 4.1 G/DL (ref 3.5–5.2)
ALP SERPL-CCNC: 164 U/L (ref 40–129)
ALT SERPL-CCNC: 76 U/L (ref 0–40)
ANION GAP SERPL CALCULATED.3IONS-SCNC: 13 MMOL/L (ref 7–16)
AST SERPL-CCNC: 74 U/L (ref 0–39)
BASOPHILS # BLD: 0.03 K/UL (ref 0–0.2)
BASOPHILS NFR BLD: 0 % (ref 0–2)
BILIRUB DIRECT SERPL-MCNC: 0.5 MG/DL (ref 0–0.3)
BILIRUB INDIRECT SERPL-MCNC: 0.7 MG/DL (ref 0–1)
BILIRUB SERPL-MCNC: 1.2 MG/DL (ref 0–1.2)
BUN SERPL-MCNC: 14 MG/DL (ref 6–20)
CALCIUM SERPL-MCNC: 9.2 MG/DL (ref 8.6–10.2)
CHLORIDE SERPL-SCNC: 102 MMOL/L (ref 98–107)
CO2 SERPL-SCNC: 22 MMOL/L (ref 22–29)
CREAT SERPL-MCNC: 1.1 MG/DL (ref 0.7–1.2)
EOSINOPHIL # BLD: 0.14 K/UL (ref 0.05–0.5)
EOSINOPHILS RELATIVE PERCENT: 2 % (ref 0–6)
ERYTHROCYTE [DISTWIDTH] IN BLOOD BY AUTOMATED COUNT: 15.2 % (ref 11.5–15)
GFR SERPL CREATININE-BSD FRML MDRD: >90 ML/MIN/1.73M2
GLUCOSE SERPL-MCNC: 106 MG/DL (ref 74–99)
HCT VFR BLD AUTO: 39 % (ref 37–54)
HGB BLD-MCNC: 13.6 G/DL (ref 12.5–16.5)
IMM GRANULOCYTES # BLD AUTO: <0.03 K/UL (ref 0–0.58)
IMM GRANULOCYTES NFR BLD: 0 % (ref 0–5)
LIPASE SERPL-CCNC: 56 U/L (ref 13–60)
LYMPHOCYTES NFR BLD: 1.7 K/UL (ref 1.5–4)
LYMPHOCYTES RELATIVE PERCENT: 23 % (ref 20–42)
MCH RBC QN AUTO: 33.2 PG (ref 26–35)
MCHC RBC AUTO-ENTMCNC: 34.9 G/DL (ref 32–34.5)
MCV RBC AUTO: 95.1 FL (ref 80–99.9)
MONOCYTES NFR BLD: 0.79 K/UL (ref 0.1–0.95)
MONOCYTES NFR BLD: 10 % (ref 2–12)
NEUTROPHILS NFR BLD: 65 % (ref 43–80)
NEUTS SEG NFR BLD: 4.88 K/UL (ref 1.8–7.3)
PLATELET # BLD AUTO: 115 K/UL (ref 130–450)
PMV BLD AUTO: 12.1 FL (ref 7–12)
POTASSIUM SERPL-SCNC: 4.2 MMOL/L (ref 3.5–5)
PROT SERPL-MCNC: 7.9 G/DL (ref 6.4–8.3)
RBC # BLD AUTO: 4.1 M/UL (ref 3.8–5.8)
SODIUM SERPL-SCNC: 137 MMOL/L (ref 132–146)
WBC OTHER # BLD: 7.6 K/UL (ref 4.5–11.5)

## 2024-04-03 PROCEDURE — 99284 EMERGENCY DEPT VISIT MOD MDM: CPT

## 2024-04-03 PROCEDURE — 76705 ECHO EXAM OF ABDOMEN: CPT

## 2024-04-03 PROCEDURE — 80053 COMPREHEN METABOLIC PANEL: CPT

## 2024-04-03 PROCEDURE — 83690 ASSAY OF LIPASE: CPT

## 2024-04-03 PROCEDURE — 85025 COMPLETE CBC W/AUTO DIFF WBC: CPT

## 2024-04-03 PROCEDURE — 82248 BILIRUBIN DIRECT: CPT

## 2024-04-03 RX ORDER — 0.9 % SODIUM CHLORIDE 0.9 %
1000 INTRAVENOUS SOLUTION INTRAVENOUS ONCE
Status: DISCONTINUED | OUTPATIENT
Start: 2024-04-03 | End: 2024-04-03 | Stop reason: HOSPADM

## 2024-04-03 ASSESSMENT — PAIN - FUNCTIONAL ASSESSMENT: PAIN_FUNCTIONAL_ASSESSMENT: NONE - DENIES PAIN

## 2024-04-03 NOTE — DISCHARGE INSTRUCTIONS
Patient has been cleared by surgery and is appropriate for outpatient follow-up.  Does not require admission for his gallstones at this time.  Understands that if he has worsening symptoms or new concerns he can return to the ED for further evaluation.

## 2024-04-03 NOTE — ED PROVIDER NOTES
Regular rate. Regular rhythm. No murmurs, no gallops, no rubs. 2+ distal pulses. Equal extremity pulses.   Chest: No chest wall tenderness  GI:  Abdomen Soft, mild tenderness in the right upper quadrant.  Negative Messer sign, Non distended.  No rebound, guarding, or rigidity.   Musculoskeletal: Moves all extremities x 4. Warm and well perfused, no cyanosis, no edema. Capillary refill <3 seconds  Integument: skin warm and dry. No rashes.  No diaphoresis or pallor.  No jaundice  Neurologic: GCS 15, no focal deficits,   Psychiatric: Normal Affect            DIAGNOSTIC RESULTS   LABS:    Labs Reviewed   BASIC METABOLIC PANEL - Abnormal; Notable for the following components:       Result Value    Glucose 106 (*)     All other components within normal limits   HEPATIC FUNCTION PANEL - Abnormal; Notable for the following components:    Alkaline Phosphatase 164 (*)     ALT 76 (*)     AST 74 (*)     Bilirubin, Direct 0.5 (*)     All other components within normal limits   CBC WITH AUTO DIFFERENTIAL - Abnormal; Notable for the following components:    MCHC 34.9 (*)     RDW 15.2 (*)     Platelets 115 (*)     MPV 12.1 (*)     All other components within normal limits   LIPASE       As interpreted by me, the above displayed labs are abnormal. All other labs obtained during this visit were within normal range or not returned as of this dictation.      RADIOLOGY:   Non-plain film images such as CT, Ultrasound and MRI are read by the radiologist. Plain radiographic images are visualized and preliminarily interpreted by the ED Provider with the below findings:      Interpretation per the Radiologist below, if available at the time of this note:    US GALLBLADDER RUQ   Final Result   1. Cholelithiasis with nonspecific gallbladder wall thickening.  Gallbladder   contraction may contribute to the appearance.  Negative sonographic Messer   sign.   2. Fatty liver. Abnormal liver morphology may indicate chronic liver disease.   3. Small

## 2024-04-03 NOTE — CONSULTS
office    Discussed with Dr. Carney    Electronically signed by Cristal Davis MD on 4/3/24 at 5:05 PM EDT

## 2024-04-09 ENCOUNTER — HOSPITAL ENCOUNTER (INPATIENT)
Age: 29
LOS: 2 days | Discharge: HOME | DRG: 263 | End: 2024-04-11
Attending: EMERGENCY MEDICINE | Admitting: SURGERY
Payer: MEDICAID

## 2024-04-09 ENCOUNTER — APPOINTMENT (OUTPATIENT)
Dept: ULTRASOUND IMAGING | Age: 29
DRG: 263 | End: 2024-04-09
Payer: MEDICAID

## 2024-04-09 ENCOUNTER — APPOINTMENT (OUTPATIENT)
Dept: GENERAL RADIOLOGY | Age: 29
DRG: 263 | End: 2024-04-09
Payer: MEDICAID

## 2024-04-09 DIAGNOSIS — K81.0 ACUTE CHOLECYSTITIS: Primary | ICD-10-CM

## 2024-04-09 DIAGNOSIS — K81.9 CHOLECYSTITIS: ICD-10-CM

## 2024-04-09 LAB
ALBUMIN SERPL-MCNC: 3.7 G/DL (ref 3.5–5.2)
ALP SERPL-CCNC: 148 U/L (ref 40–129)
ALT SERPL-CCNC: 55 U/L (ref 0–40)
ANION GAP SERPL CALCULATED.3IONS-SCNC: 12 MMOL/L (ref 7–16)
AST SERPL-CCNC: 58 U/L (ref 0–39)
BASOPHILS # BLD: 0.04 K/UL (ref 0–0.2)
BASOPHILS NFR BLD: 1 % (ref 0–2)
BILIRUB DIRECT SERPL-MCNC: 0.4 MG/DL (ref 0–0.3)
BILIRUB INDIRECT SERPL-MCNC: 0.5 MG/DL (ref 0–1)
BILIRUB SERPL-MCNC: 0.9 MG/DL (ref 0–1.2)
BUN SERPL-MCNC: 20 MG/DL (ref 6–20)
CALCIUM SERPL-MCNC: 9.3 MG/DL (ref 8.6–10.2)
CHLORIDE SERPL-SCNC: 104 MMOL/L (ref 98–107)
CO2 SERPL-SCNC: 21 MMOL/L (ref 22–29)
CREAT SERPL-MCNC: 1.3 MG/DL (ref 0.7–1.2)
EOSINOPHIL # BLD: 0.24 K/UL (ref 0.05–0.5)
EOSINOPHILS RELATIVE PERCENT: 3 % (ref 0–6)
ERYTHROCYTE [DISTWIDTH] IN BLOOD BY AUTOMATED COUNT: 15.1 % (ref 11.5–15)
GFR SERPL CREATININE-BSD FRML MDRD: 77 ML/MIN/1.73M2
GLUCOSE SERPL-MCNC: 122 MG/DL (ref 74–99)
HCT VFR BLD AUTO: 37 % (ref 37–54)
HGB BLD-MCNC: 12.7 G/DL (ref 12.5–16.5)
IMM GRANULOCYTES # BLD AUTO: <0.03 K/UL (ref 0–0.58)
IMM GRANULOCYTES NFR BLD: 0 % (ref 0–5)
LIPASE SERPL-CCNC: 67 U/L (ref 13–60)
LYMPHOCYTES NFR BLD: 1.78 K/UL (ref 1.5–4)
LYMPHOCYTES RELATIVE PERCENT: 25 % (ref 20–42)
MCH RBC QN AUTO: 33.4 PG (ref 26–35)
MCHC RBC AUTO-ENTMCNC: 34.3 G/DL (ref 32–34.5)
MCV RBC AUTO: 97.4 FL (ref 80–99.9)
MONOCYTES NFR BLD: 0.55 K/UL (ref 0.1–0.95)
MONOCYTES NFR BLD: 8 % (ref 2–12)
NEUTROPHILS NFR BLD: 63 % (ref 43–80)
NEUTS SEG NFR BLD: 4.56 K/UL (ref 1.8–7.3)
PLATELET, FLUORESCENCE: 103 K/UL (ref 130–450)
PMV BLD AUTO: 13.8 FL (ref 7–12)
POTASSIUM SERPL-SCNC: 4.3 MMOL/L (ref 3.5–5)
PROT SERPL-MCNC: 7.2 G/DL (ref 6.4–8.3)
RBC # BLD AUTO: 3.8 M/UL (ref 3.8–5.8)
SODIUM SERPL-SCNC: 137 MMOL/L (ref 132–146)
TROPONIN I SERPL HS-MCNC: <6 NG/L (ref 0–11)
WBC OTHER # BLD: 7.2 K/UL (ref 4.5–11.5)

## 2024-04-09 PROCEDURE — 6360000002 HC RX W HCPCS: Performed by: SURGERY

## 2024-04-09 PROCEDURE — 99285 EMERGENCY DEPT VISIT HI MDM: CPT

## 2024-04-09 PROCEDURE — 71045 X-RAY EXAM CHEST 1 VIEW: CPT

## 2024-04-09 PROCEDURE — 76705 ECHO EXAM OF ABDOMEN: CPT

## 2024-04-09 PROCEDURE — 2580000003 HC RX 258: Performed by: EMERGENCY MEDICINE

## 2024-04-09 PROCEDURE — 80053 COMPREHEN METABOLIC PANEL: CPT

## 2024-04-09 PROCEDURE — 93005 ELECTROCARDIOGRAM TRACING: CPT | Performed by: EMERGENCY MEDICINE

## 2024-04-09 PROCEDURE — G0378 HOSPITAL OBSERVATION PER HR: HCPCS

## 2024-04-09 PROCEDURE — 96374 THER/PROPH/DIAG INJ IV PUSH: CPT

## 2024-04-09 PROCEDURE — 96361 HYDRATE IV INFUSION ADD-ON: CPT

## 2024-04-09 PROCEDURE — 96365 THER/PROPH/DIAG IV INF INIT: CPT

## 2024-04-09 PROCEDURE — 96375 TX/PRO/DX INJ NEW DRUG ADDON: CPT

## 2024-04-09 PROCEDURE — 6360000002 HC RX W HCPCS: Performed by: EMERGENCY MEDICINE

## 2024-04-09 PROCEDURE — 1200000000 HC SEMI PRIVATE

## 2024-04-09 PROCEDURE — 84484 ASSAY OF TROPONIN QUANT: CPT

## 2024-04-09 PROCEDURE — 2580000003 HC RX 258: Performed by: SURGERY

## 2024-04-09 PROCEDURE — 83690 ASSAY OF LIPASE: CPT

## 2024-04-09 PROCEDURE — 82248 BILIRUBIN DIRECT: CPT

## 2024-04-09 PROCEDURE — 6370000000 HC RX 637 (ALT 250 FOR IP): Performed by: SURGERY

## 2024-04-09 PROCEDURE — 85025 COMPLETE CBC W/AUTO DIFF WBC: CPT

## 2024-04-09 RX ORDER — ERGOCALCIFEROL 1.25 MG/1
50000 CAPSULE ORAL WEEKLY
Status: DISCONTINUED | OUTPATIENT
Start: 2024-04-16 | End: 2024-04-11 | Stop reason: HOSPADM

## 2024-04-09 RX ORDER — GAUZE BANDAGE 2" X 2"
300 BANDAGE TOPICAL DAILY
Status: DISCONTINUED | OUTPATIENT
Start: 2024-04-10 | End: 2024-04-11 | Stop reason: HOSPADM

## 2024-04-09 RX ORDER — SODIUM CHLORIDE 0.9 % (FLUSH) 0.9 %
5-40 SYRINGE (ML) INJECTION EVERY 12 HOURS SCHEDULED
Status: DISCONTINUED | OUTPATIENT
Start: 2024-04-09 | End: 2024-04-11 | Stop reason: HOSPADM

## 2024-04-09 RX ORDER — POTASSIUM CHLORIDE 7.45 MG/ML
10 INJECTION INTRAVENOUS PRN
Status: DISCONTINUED | OUTPATIENT
Start: 2024-04-09 | End: 2024-04-11 | Stop reason: HOSPADM

## 2024-04-09 RX ORDER — SODIUM CHLORIDE 9 MG/ML
INJECTION, SOLUTION INTRAVENOUS PRN
Status: DISCONTINUED | OUTPATIENT
Start: 2024-04-09 | End: 2024-04-11 | Stop reason: HOSPADM

## 2024-04-09 RX ORDER — FUROSEMIDE 40 MG/1
40 TABLET ORAL DAILY
COMMUNITY

## 2024-04-09 RX ORDER — METRONIDAZOLE 500 MG/100ML
500 INJECTION, SOLUTION INTRAVENOUS EVERY 8 HOURS
Status: DISCONTINUED | OUTPATIENT
Start: 2024-04-10 | End: 2024-04-11 | Stop reason: HOSPADM

## 2024-04-09 RX ORDER — CETIRIZINE HYDROCHLORIDE 10 MG/1
10 TABLET ORAL DAILY PRN
COMMUNITY

## 2024-04-09 RX ORDER — GUAIFENESIN 600 MG/1
1200 TABLET, EXTENDED RELEASE ORAL 2 TIMES DAILY PRN
COMMUNITY

## 2024-04-09 RX ORDER — OXYCODONE HYDROCHLORIDE 5 MG/1
5 TABLET ORAL EVERY 4 HOURS PRN
Status: DISCONTINUED | OUTPATIENT
Start: 2024-04-09 | End: 2024-04-11 | Stop reason: HOSPADM

## 2024-04-09 RX ORDER — M-VIT,TX,IRON,MINS/CALC/FOLIC 27MG-0.4MG
1 TABLET ORAL DAILY
Status: DISCONTINUED | OUTPATIENT
Start: 2024-04-10 | End: 2024-04-11 | Stop reason: HOSPADM

## 2024-04-09 RX ORDER — MORPHINE SULFATE 2 MG/ML
2 INJECTION, SOLUTION INTRAMUSCULAR; INTRAVENOUS
Status: DISCONTINUED | OUTPATIENT
Start: 2024-04-09 | End: 2024-04-11 | Stop reason: HOSPADM

## 2024-04-09 RX ORDER — FOLIC ACID 1 MG/1
1 TABLET ORAL DAILY
Status: DISCONTINUED | OUTPATIENT
Start: 2024-04-10 | End: 2024-04-11 | Stop reason: HOSPADM

## 2024-04-09 RX ORDER — ENOXAPARIN SODIUM 100 MG/ML
40 INJECTION SUBCUTANEOUS DAILY
Status: DISCONTINUED | OUTPATIENT
Start: 2024-04-10 | End: 2024-04-11 | Stop reason: HOSPADM

## 2024-04-09 RX ORDER — POTASSIUM CHLORIDE 20 MEQ/1
40 TABLET, EXTENDED RELEASE ORAL PRN
Status: DISCONTINUED | OUTPATIENT
Start: 2024-04-09 | End: 2024-04-11 | Stop reason: HOSPADM

## 2024-04-09 RX ORDER — METRONIDAZOLE 500 MG/100ML
500 INJECTION, SOLUTION INTRAVENOUS ONCE
Status: COMPLETED | OUTPATIENT
Start: 2024-04-09 | End: 2024-04-09

## 2024-04-09 RX ORDER — QUETIAPINE FUMARATE 100 MG/1
100 TABLET, FILM COATED ORAL NIGHTLY
Status: DISCONTINUED | OUTPATIENT
Start: 2024-04-09 | End: 2024-04-11 | Stop reason: HOSPADM

## 2024-04-09 RX ORDER — ONDANSETRON 4 MG/1
4 TABLET, ORALLY DISINTEGRATING ORAL EVERY 8 HOURS PRN
Status: DISCONTINUED | OUTPATIENT
Start: 2024-04-09 | End: 2024-04-11 | Stop reason: HOSPADM

## 2024-04-09 RX ORDER — OXYCODONE HYDROCHLORIDE 5 MG/1
10 TABLET ORAL EVERY 4 HOURS PRN
Status: DISCONTINUED | OUTPATIENT
Start: 2024-04-09 | End: 2024-04-11 | Stop reason: HOSPADM

## 2024-04-09 RX ORDER — LANOLIN ALCOHOL/MO/W.PET/CERES
500 CREAM (GRAM) TOPICAL DAILY
Status: DISCONTINUED | OUTPATIENT
Start: 2024-04-10 | End: 2024-04-11 | Stop reason: HOSPADM

## 2024-04-09 RX ORDER — MORPHINE SULFATE 4 MG/ML
4 INJECTION, SOLUTION INTRAMUSCULAR; INTRAVENOUS ONCE
Status: COMPLETED | OUTPATIENT
Start: 2024-04-09 | End: 2024-04-09

## 2024-04-09 RX ORDER — ONDANSETRON 2 MG/ML
4 INJECTION INTRAMUSCULAR; INTRAVENOUS EVERY 6 HOURS PRN
Status: DISCONTINUED | OUTPATIENT
Start: 2024-04-09 | End: 2024-04-11 | Stop reason: HOSPADM

## 2024-04-09 RX ORDER — CLONIDINE HYDROCHLORIDE 0.1 MG/1
0.1 TABLET ORAL 3 TIMES DAILY
Status: DISCONTINUED | OUTPATIENT
Start: 2024-04-09 | End: 2024-04-11 | Stop reason: HOSPADM

## 2024-04-09 RX ORDER — TRAZODONE HYDROCHLORIDE 50 MG/1
100 TABLET ORAL NIGHTLY PRN
COMMUNITY

## 2024-04-09 RX ORDER — HYDROXYZINE PAMOATE 25 MG/1
25 CAPSULE ORAL 3 TIMES DAILY PRN
Status: DISCONTINUED | OUTPATIENT
Start: 2024-04-09 | End: 2024-04-11 | Stop reason: HOSPADM

## 2024-04-09 RX ORDER — SPIRONOLACTONE 100 MG/1
100 TABLET, FILM COATED ORAL DAILY
COMMUNITY

## 2024-04-09 RX ORDER — KETOROLAC TROMETHAMINE 15 MG/ML
15 INJECTION, SOLUTION INTRAMUSCULAR; INTRAVENOUS ONCE
Status: COMPLETED | OUTPATIENT
Start: 2024-04-09 | End: 2024-04-09

## 2024-04-09 RX ORDER — SODIUM CHLORIDE 0.9 % (FLUSH) 0.9 %
5-40 SYRINGE (ML) INJECTION PRN
Status: DISCONTINUED | OUTPATIENT
Start: 2024-04-09 | End: 2024-04-11 | Stop reason: HOSPADM

## 2024-04-09 RX ORDER — IBUPROFEN 800 MG/1
800 TABLET ORAL EVERY 8 HOURS PRN
COMMUNITY

## 2024-04-09 RX ORDER — PHENOL 1.4 %
10 AEROSOL, SPRAY (ML) MUCOUS MEMBRANE NIGHTLY PRN
COMMUNITY

## 2024-04-09 RX ORDER — 0.9 % SODIUM CHLORIDE 0.9 %
1000 INTRAVENOUS SOLUTION INTRAVENOUS ONCE
Status: COMPLETED | OUTPATIENT
Start: 2024-04-09 | End: 2024-04-09

## 2024-04-09 RX ORDER — MORPHINE SULFATE 4 MG/ML
4 INJECTION, SOLUTION INTRAMUSCULAR; INTRAVENOUS
Status: DISCONTINUED | OUTPATIENT
Start: 2024-04-09 | End: 2024-04-11 | Stop reason: HOSPADM

## 2024-04-09 RX ORDER — DOXYCYCLINE HYCLATE 100 MG/1
100 CAPSULE ORAL 2 TIMES DAILY
COMMUNITY

## 2024-04-09 RX ORDER — ARIPIPRAZOLE 5 MG/1
5 TABLET ORAL DAILY
Status: DISCONTINUED | OUTPATIENT
Start: 2024-04-10 | End: 2024-04-10

## 2024-04-09 RX ORDER — MAGNESIUM SULFATE IN WATER 40 MG/ML
2000 INJECTION, SOLUTION INTRAVENOUS PRN
Status: DISCONTINUED | OUTPATIENT
Start: 2024-04-09 | End: 2024-04-11 | Stop reason: HOSPADM

## 2024-04-09 RX ORDER — SODIUM CHLORIDE 9 MG/ML
INJECTION, SOLUTION INTRAVENOUS CONTINUOUS
Status: DISCONTINUED | OUTPATIENT
Start: 2024-04-09 | End: 2024-04-11 | Stop reason: HOSPADM

## 2024-04-09 RX ADMIN — WATER 1000 MG: 1 INJECTION INTRAMUSCULAR; INTRAVENOUS; SUBCUTANEOUS at 21:05

## 2024-04-09 RX ADMIN — ONDANSETRON 4 MG: 2 INJECTION INTRAMUSCULAR; INTRAVENOUS at 23:18

## 2024-04-09 RX ADMIN — SODIUM CHLORIDE: 9 INJECTION, SOLUTION INTRAVENOUS at 23:13

## 2024-04-09 RX ADMIN — HYDROXYZINE PAMOATE 25 MG: 25 CAPSULE ORAL at 23:11

## 2024-04-09 RX ADMIN — MORPHINE SULFATE 4 MG: 4 INJECTION, SOLUTION INTRAMUSCULAR; INTRAVENOUS at 21:05

## 2024-04-09 RX ADMIN — KETOROLAC TROMETHAMINE 15 MG: 15 INJECTION, SOLUTION INTRAMUSCULAR; INTRAVENOUS at 19:51

## 2024-04-09 RX ADMIN — SODIUM CHLORIDE, PRESERVATIVE FREE 10 ML: 5 INJECTION INTRAVENOUS at 23:15

## 2024-04-09 RX ADMIN — OXYCODONE 10 MG: 5 TABLET ORAL at 23:11

## 2024-04-09 RX ADMIN — METRONIDAZOLE 500 MG: 500 INJECTION, SOLUTION INTRAVENOUS at 21:12

## 2024-04-09 RX ADMIN — SODIUM CHLORIDE 1000 ML: 9 INJECTION, SOLUTION INTRAVENOUS at 19:50

## 2024-04-09 RX ADMIN — CLONIDINE HYDROCHLORIDE 0.1 MG: 0.1 TABLET ORAL at 23:11

## 2024-04-09 RX ADMIN — QUETIAPINE 100 MG: 100 TABLET ORAL at 23:11

## 2024-04-09 ASSESSMENT — PAIN DESCRIPTION - FREQUENCY
FREQUENCY: CONTINUOUS
FREQUENCY: INTERMITTENT

## 2024-04-09 ASSESSMENT — PAIN DESCRIPTION - LOCATION
LOCATION: ABDOMEN
LOCATION: ABDOMEN

## 2024-04-09 ASSESSMENT — PAIN SCALES - GENERAL
PAINLEVEL_OUTOF10: 3
PAINLEVEL_OUTOF10: 4
PAINLEVEL_OUTOF10: 9

## 2024-04-09 ASSESSMENT — PAIN DESCRIPTION - ORIENTATION: ORIENTATION: RIGHT

## 2024-04-09 ASSESSMENT — PAIN - FUNCTIONAL ASSESSMENT
PAIN_FUNCTIONAL_ASSESSMENT: 0-10
PAIN_FUNCTIONAL_ASSESSMENT: ACTIVITIES ARE NOT PREVENTED

## 2024-04-09 ASSESSMENT — PAIN DESCRIPTION - DESCRIPTORS: DESCRIPTORS: PRESSURE;SHARP

## 2024-04-09 ASSESSMENT — PAIN DESCRIPTION - ONSET: ONSET: ON-GOING

## 2024-04-09 ASSESSMENT — PAIN DESCRIPTION - PAIN TYPE
TYPE: ACUTE PAIN
TYPE: ACUTE PAIN

## 2024-04-09 NOTE — ED PROVIDER NOTES
ED PROVIDER NOTE    Chief Complaint   Patient presents with    Chest Pain     Right sided chest pain that radiates to back.        HPI:  4/9/24,   Time: 7:42 PM EDT       Justo Chester is a 29 y.o. male presenting to the ED for right-sided chest and abdominal pain.  Ongoing intermittently for several months, was seen recently in the ED and told he has cholelithiasis.  Was referred for outpatient surgery follow-up.  Today after eating dinner at [facility] where he is currently recovering from alcohol use disorder, he had sudden onset recurrence of the pain in the right side of his chest, radiates to the right scapula and right upper quadrant.  Associated nausea.  Associated chills.  No fever or vomiting.  Received Zofran from EMS.  No history of abdominal surgery.  Does have history of cirrhosis and HCV.  No associated shortness of breath.  No known history of cardiac disease. No hx of VTE, recent surgery/immobilization, leg swelling, hemoptysis, syncope, or hormonal medication use.     Chart review: hx of anxiety, depression, HCV, alcoholic hepatitis with ascites, bipolar disorder    Reviewed general surgery consult note from 4/3/2024 by Dr. Davis:  Dx cholelithiasis, alcoholic cirrhosis.  No current abdominal symptoms.  Cleared for discharge to rehab.      Review of Systems:     Review of Systems  Pertinent positives and negatives as stated in HPI     --------------------------------------------- PAST HISTORY ---------------------------------------------  Past Medical History:   Past Medical History:   Diagnosis Date    Anxiety     Depression        Past Surgical History:   Past Surgical History:   Procedure Laterality Date    SKIN BIOPSY         Social History:   Social History     Socioeconomic History    Marital status: Single   Tobacco Use    Smoking status: Every Day     Current packs/day: 0.50     Types: Cigarettes    Smokeless tobacco: Never   Vaping Use    Vaping Use: Every day    Substances: Nicotine  abdominal pain.  On arrival patient is afebrile and hemodynamically stable.  Does have focal right upper quadrant tenderness to palpation on exam.  Lab workup notable for transaminitis, stable from prior baseline values in the setting of alcoholic cirrhosis and hepatitis.  Right upper quadrant ultrasound shows gallbladder wall thickening and positive sonographic Messer sign consistent with cholecystitis.  Started IV antibiotics.  Treated with pain medication and IV fluids.  Consulted general surgery who will admit the patient to their service for further management.       --------------------------------- IMPRESSION AND DISPOSITION ---------------------------------    IMPRESSION  1. Acute cholecystitis        DISPOSITION  Disposition: Admit to med/surg floor  Patient condition is stable    NOTE: This report was transcribed using voice recognition software. Every effort was made to ensure accuracy; however, inadvertent computerized transcription errors may be present    Kwame Mathew MD  Attending Emergency Physician         Kwame Mathew MD  04/09/24 0496

## 2024-04-10 LAB
ALBUMIN SERPL-MCNC: 3.4 G/DL (ref 3.5–5.2)
ALP SERPL-CCNC: 120 U/L (ref 40–129)
ALT SERPL-CCNC: 44 U/L (ref 0–40)
ANION GAP SERPL CALCULATED.3IONS-SCNC: 11 MMOL/L (ref 7–16)
AST SERPL-CCNC: 44 U/L (ref 0–39)
BASOPHILS # BLD: 0 K/UL (ref 0–0.2)
BASOPHILS NFR BLD: 0 % (ref 0–2)
BILIRUB SERPL-MCNC: 1 MG/DL (ref 0–1.2)
BUN SERPL-MCNC: 17 MG/DL (ref 6–20)
CALCIUM SERPL-MCNC: 8.4 MG/DL (ref 8.6–10.2)
CHLORIDE SERPL-SCNC: 103 MMOL/L (ref 98–107)
CO2 SERPL-SCNC: 22 MMOL/L (ref 22–29)
CREAT SERPL-MCNC: 1.2 MG/DL (ref 0.7–1.2)
EKG ATRIAL RATE: 69 BPM
EKG P AXIS: 5 DEGREES
EKG P-R INTERVAL: 144 MS
EKG Q-T INTERVAL: 422 MS
EKG QRS DURATION: 86 MS
EKG QTC CALCULATION (BAZETT): 452 MS
EKG R AXIS: 22 DEGREES
EKG T AXIS: 22 DEGREES
EKG VENTRICULAR RATE: 69 BPM
EOSINOPHIL # BLD: 0.34 K/UL (ref 0.05–0.5)
EOSINOPHILS RELATIVE PERCENT: 5 % (ref 0–6)
ERYTHROCYTE [DISTWIDTH] IN BLOOD BY AUTOMATED COUNT: 14.6 % (ref 11.5–15)
GFR SERPL CREATININE-BSD FRML MDRD: 84 ML/MIN/1.73M2
GLUCOSE SERPL-MCNC: 100 MG/DL (ref 74–99)
HCT VFR BLD AUTO: 33.3 % (ref 37–54)
HGB BLD-MCNC: 12 G/DL (ref 12.5–16.5)
INR PPP: 1.4
LYMPHOCYTES NFR BLD: 2.02 K/UL (ref 1.5–4)
LYMPHOCYTES RELATIVE PERCENT: 32 % (ref 20–42)
MCH RBC QN AUTO: 33.9 PG (ref 26–35)
MCHC RBC AUTO-ENTMCNC: 36 G/DL (ref 32–34.5)
MCV RBC AUTO: 94.1 FL (ref 80–99.9)
MONOCYTES NFR BLD: 0.11 K/UL (ref 0.1–0.95)
MONOCYTES NFR BLD: 2 % (ref 2–12)
NEUTROPHILS NFR BLD: 61 % (ref 43–80)
NEUTS SEG NFR BLD: 3.93 K/UL (ref 1.8–7.3)
PLATELET CONFIRMATION: NORMAL
PLATELET, FLUORESCENCE: 95 K/UL (ref 130–450)
PMV BLD AUTO: 13.5 FL (ref 7–12)
POTASSIUM SERPL-SCNC: 3.9 MMOL/L (ref 3.5–5)
PROT SERPL-MCNC: 6.4 G/DL (ref 6.4–8.3)
PROTHROMBIN TIME: 15.7 SEC (ref 9.3–12.4)
RBC # BLD AUTO: 3.54 M/UL (ref 3.8–5.8)
RBC # BLD: ABNORMAL 10*6/UL
SODIUM SERPL-SCNC: 136 MMOL/L (ref 132–146)
WBC OTHER # BLD: 6.4 K/UL (ref 4.5–11.5)

## 2024-04-10 PROCEDURE — 6370000000 HC RX 637 (ALT 250 FOR IP): Performed by: SURGERY

## 2024-04-10 PROCEDURE — 96372 THER/PROPH/DIAG INJ SC/IM: CPT

## 2024-04-10 PROCEDURE — 6360000002 HC RX W HCPCS: Performed by: SURGERY

## 2024-04-10 PROCEDURE — 96376 TX/PRO/DX INJ SAME DRUG ADON: CPT

## 2024-04-10 PROCEDURE — 85025 COMPLETE CBC W/AUTO DIFF WBC: CPT

## 2024-04-10 PROCEDURE — 93010 ELECTROCARDIOGRAM REPORT: CPT | Performed by: INTERNAL MEDICINE

## 2024-04-10 PROCEDURE — 96361 HYDRATE IV INFUSION ADD-ON: CPT

## 2024-04-10 PROCEDURE — A4216 STERILE WATER/SALINE, 10 ML: HCPCS | Performed by: SURGERY

## 2024-04-10 PROCEDURE — 2580000003 HC RX 258: Performed by: SURGERY

## 2024-04-10 PROCEDURE — 2580000003 HC RX 258

## 2024-04-10 PROCEDURE — 36415 COLL VENOUS BLD VENIPUNCTURE: CPT

## 2024-04-10 PROCEDURE — 96375 TX/PRO/DX INJ NEW DRUG ADDON: CPT

## 2024-04-10 PROCEDURE — G0378 HOSPITAL OBSERVATION PER HR: HCPCS

## 2024-04-10 PROCEDURE — 1200000000 HC SEMI PRIVATE

## 2024-04-10 PROCEDURE — 99223 1ST HOSP IP/OBS HIGH 75: CPT | Performed by: SURGERY

## 2024-04-10 PROCEDURE — 80053 COMPREHEN METABOLIC PANEL: CPT

## 2024-04-10 PROCEDURE — C9113 INJ PANTOPRAZOLE SODIUM, VIA: HCPCS | Performed by: SURGERY

## 2024-04-10 PROCEDURE — 85610 PROTHROMBIN TIME: CPT

## 2024-04-10 RX ORDER — IBUPROFEN 800 MG/1
800 TABLET ORAL EVERY 8 HOURS PRN
Qty: 30 TABLET | Refills: 0 | Status: SHIPPED | OUTPATIENT
Start: 2024-04-10 | End: 2024-04-20

## 2024-04-10 RX ADMIN — ONDANSETRON 4 MG: 2 INJECTION INTRAMUSCULAR; INTRAVENOUS at 06:38

## 2024-04-10 RX ADMIN — CYANOCOBALAMIN TAB 1000 MCG 500 MCG: 1000 TAB at 08:11

## 2024-04-10 RX ADMIN — CLONIDINE HYDROCHLORIDE 0.1 MG: 0.1 TABLET ORAL at 20:35

## 2024-04-10 RX ADMIN — MORPHINE SULFATE 4 MG: 4 INJECTION, SOLUTION INTRAMUSCULAR; INTRAVENOUS at 15:27

## 2024-04-10 RX ADMIN — OXYCODONE 10 MG: 5 TABLET ORAL at 20:33

## 2024-04-10 RX ADMIN — ONDANSETRON 4 MG: 2 INJECTION INTRAMUSCULAR; INTRAVENOUS at 15:27

## 2024-04-10 RX ADMIN — CLONIDINE HYDROCHLORIDE 0.1 MG: 0.1 TABLET ORAL at 15:39

## 2024-04-10 RX ADMIN — MORPHINE SULFATE 4 MG: 4 INJECTION, SOLUTION INTRAMUSCULAR; INTRAVENOUS at 01:09

## 2024-04-10 RX ADMIN — OXYCODONE 10 MG: 5 TABLET ORAL at 03:03

## 2024-04-10 RX ADMIN — MORPHINE SULFATE 4 MG: 4 INJECTION, SOLUTION INTRAMUSCULAR; INTRAVENOUS at 10:29

## 2024-04-10 RX ADMIN — WATER 1000 MG: 1 INJECTION INTRAMUSCULAR; INTRAVENOUS; SUBCUTANEOUS at 20:37

## 2024-04-10 RX ADMIN — QUETIAPINE 100 MG: 100 TABLET ORAL at 20:35

## 2024-04-10 RX ADMIN — HYDROXYZINE PAMOATE 25 MG: 25 CAPSULE ORAL at 08:20

## 2024-04-10 RX ADMIN — ENOXAPARIN SODIUM 40 MG: 100 INJECTION SUBCUTANEOUS at 10:29

## 2024-04-10 RX ADMIN — METRONIDAZOLE 500 MG: 500 INJECTION, SOLUTION INTRAVENOUS at 06:43

## 2024-04-10 RX ADMIN — OXYCODONE 10 MG: 5 TABLET ORAL at 08:11

## 2024-04-10 RX ADMIN — SODIUM CHLORIDE, PRESERVATIVE FREE 40 MG: 5 INJECTION INTRAVENOUS at 08:11

## 2024-04-10 RX ADMIN — METRONIDAZOLE 500 MG: 500 INJECTION, SOLUTION INTRAVENOUS at 20:33

## 2024-04-10 RX ADMIN — FOLIC ACID 1 MG: 1 TABLET ORAL at 08:11

## 2024-04-10 RX ADMIN — Medication 300 MG: at 08:10

## 2024-04-10 RX ADMIN — Medication 1 TABLET: at 08:11

## 2024-04-10 RX ADMIN — HYDROXYZINE PAMOATE 25 MG: 25 CAPSULE ORAL at 15:39

## 2024-04-10 RX ADMIN — MORPHINE SULFATE 4 MG: 4 INJECTION, SOLUTION INTRAMUSCULAR; INTRAVENOUS at 06:38

## 2024-04-10 RX ADMIN — MORPHINE SULFATE 4 MG: 4 INJECTION, SOLUTION INTRAMUSCULAR; INTRAVENOUS at 22:06

## 2024-04-10 RX ADMIN — SODIUM CHLORIDE: 9 INJECTION, SOLUTION INTRAVENOUS at 20:31

## 2024-04-10 RX ADMIN — METRONIDAZOLE 500 MG: 500 INJECTION, SOLUTION INTRAVENOUS at 13:06

## 2024-04-10 ASSESSMENT — PAIN DESCRIPTION - LOCATION
LOCATION: ABDOMEN

## 2024-04-10 ASSESSMENT — PAIN SCALES - WONG BAKER
WONGBAKER_NUMERICALRESPONSE: HURTS A LITTLE BIT
WONGBAKER_NUMERICALRESPONSE: HURTS A LITTLE BIT

## 2024-04-10 ASSESSMENT — PAIN DESCRIPTION - ORIENTATION
ORIENTATION: UPPER
ORIENTATION: RIGHT
ORIENTATION: RIGHT

## 2024-04-10 ASSESSMENT — PAIN SCALES - GENERAL
PAINLEVEL_OUTOF10: 5
PAINLEVEL_OUTOF10: 8
PAINLEVEL_OUTOF10: 7
PAINLEVEL_OUTOF10: 8
PAINLEVEL_OUTOF10: 3
PAINLEVEL_OUTOF10: 9
PAINLEVEL_OUTOF10: 8
PAINLEVEL_OUTOF10: 5

## 2024-04-10 ASSESSMENT — PAIN DESCRIPTION - FREQUENCY
FREQUENCY: CONTINUOUS
FREQUENCY: CONTINUOUS

## 2024-04-10 ASSESSMENT — PAIN - FUNCTIONAL ASSESSMENT
PAIN_FUNCTIONAL_ASSESSMENT: ACTIVITIES ARE NOT PREVENTED
PAIN_FUNCTIONAL_ASSESSMENT: ACTIVITIES ARE NOT PREVENTED

## 2024-04-10 ASSESSMENT — PAIN DESCRIPTION - ONSET
ONSET: ON-GOING
ONSET: ON-GOING

## 2024-04-10 ASSESSMENT — PAIN DESCRIPTION - PAIN TYPE
TYPE: ACUTE PAIN
TYPE: ACUTE PAIN

## 2024-04-10 ASSESSMENT — PAIN DESCRIPTION - DESCRIPTORS
DESCRIPTORS: ACHING;DISCOMFORT;TEARING
DESCRIPTORS: ACHING;DISCOMFORT;TEARING
DESCRIPTORS: PRESSURE;SHARP
DESCRIPTORS: PRESSURE;SHARP

## 2024-04-10 NOTE — PLAN OF CARE
Problem: Discharge Planning  Goal: Discharge to home or other facility with appropriate resources  4/10/2024 1908 by Garth Catalan, RN  Outcome: Progressing     Problem: Pain  Goal: Verbalizes/displays adequate comfort level or baseline comfort level  4/10/2024 1908 by Garth Catalan, RN  Outcome: Progressing     Problem: Gastrointestinal - Adult  Goal: Minimal or absence of nausea and vomiting  Outcome: Progressing  Goal: Maintains or returns to baseline bowel function  Outcome: Progressing

## 2024-04-10 NOTE — PROGRESS NOTES
4 Eyes Skin Assessment     NAME:  Justo Chester  YOB: 1995  MEDICAL RECORD NUMBER:  [id number]    The patient is being assessed for  Admission    I agree that at least one RN has performed a thorough Head to Toe Skin Assessment on the patient. ALL assessment sites listed below have been assessed.      Areas assessed by both nurses:    Head, Face, Ears, Shoulders, Back, Chest, Arms, Elbows, Hands, Sacrum. Buttock, Coccyx, Ischium, and Legs. Feet and Heels        Does the Patient have a Wound? No noted wound(s)       Tomi Prevention initiated by RN: No  Wound Care Orders initiated by RN: No    Pressure Injury (Stage 3,4, Unstageable, DTI, NWPT, and Complex wounds) if present, place Wound referral order by RN under : No    New Ostomies, if present place, Ostomy referral order under : No     Nurse 1 eSignature: Electronically signed by Cami Oviedo RN on 4/10/24 at 6:59 AM EDT    **SHARE this note so that the co-signing nurse can place an eSignature**    Nurse 2 eSignature: {Esignature:[id number]}

## 2024-04-10 NOTE — H&P
General Surgery   History and Physical Exam      Patient's Name/Date of Birth: Justo Chester / 1995    Date: April 10, 2024     PCP: No primary care provider on file.     Chief Complaint: Abdominal pain    HPI:   Justo Chester is a 29 y.o. male who presents for evaluation of abdominal pain and pain in his right shoulder. Timing is constant, radiation to none, alleviated by pain medicine and decreased p.o. intake and started yesterday but has had pain attacks in the past, he describes it as crampy and severity is moderate to severe.  Patient's history is pertinent for alcohol abuse and mood disorder.  He states he has not had a drink in several days.  He was recently seen by our department approximately week and a half ago for right upper quadrant epigastric pain.  Per chart review patient apparently has had banding in the past for esophageal varices in the setting of portal hypertension, hepatitis C and alcohol abuse.    Of note there is a progress note from the hepatology group on 2/27 they reported he finished a hepatitis C treatment, at that time he was still drinking alcohol.  He has had episodes of decompensated cirrhosis with ascites.    Patient Active Problem List   Diagnosis    Cholecystitis    Marijuana use    Alcohol use    Acute cholecystitis       No Known Allergies    Past Medical History:   Diagnosis Date    Anxiety     Depression        Past Surgical History:   Procedure Laterality Date    SKIN BIOPSY         Social History     Socioeconomic History    Marital status: Single     Spouse name: Not on file    Number of children: Not on file    Years of education: Not on file    Highest education level: Not on file   Occupational History    Not on file   Tobacco Use    Smoking status: Every Day     Current packs/day: 0.50     Types: Cigarettes    Smokeless tobacco: Never   Vaping Use    Vaping Use: Every day    Substances: Nicotine   Substance and Sexual Activity    Alcohol use: Yes     Alcohol/week: 5.0

## 2024-04-10 NOTE — CARE COORDINATION
Case Management Assessment  Initial Evaluation    Date/Time of Evaluation: 4/10/2024 3:06 PM  Assessment Completed by: Yuki Ballard RN    If patient is discharged prior to next notation, then this note serves as note for discharge by case management.    Patient Name: Justo Chester                   YOB: 1995  Diagnosis: Acute cholecystitis [K81.0]                   Date / Time: 4/9/2024  7:29 PM    Patient Admission Status: Inpatient   Readmission Risk (Low < 19, Mod (19-27), High > 27): Readmission Risk Score: 14.3    Current PCP: No primary care provider on file.  PCP verified by CM? No (pt lives in [place], no PCP)    Chart Reviewed: Yes      History Provided by: Patient  Patient Orientation: Alert and Oriented    Patient Cognition: Alert    Hospitalization in the last 30 days (Readmission):  No    If yes, Readmission Assessment in  Navigator will be completed.    Advance Directives:      Code Status: Full Code   Patient's Primary Decision Maker is: Legal Next of Kin      Discharge Planning:    Patient lives with: Alone Type of Home: House  Primary Care Giver: Self  Patient Support Systems include: Family Members     ADLS  Prior functional level: Independent in ADLs/IADLs  Current functional level: Independent in ADLs/IADLs      Family can provide assistance at DC: Yes  Would you like Case Management to discuss the discharge plan with any other family members/significant others, and if so, who? No  Plans to Return to Present Housing: Yes  Other Identified Issues/Barriers to RETURNING to current housing: none     Financial    Payor: [employer] [place] / Plan: [employer] [place] [place] / Product Type: *No Product type* /       No Pharmacies Listed    Notes:    Factors facilitating achievement of predicted outcomes: Family support    Barriers to discharge: none     Additional Case Management Notes: 4-10-Cm note: met with pt for transition of care, pt lives in [place] but he is here for rehab at [facility]

## 2024-04-11 ENCOUNTER — APPOINTMENT (OUTPATIENT)
Dept: GENERAL RADIOLOGY | Age: 29
DRG: 263 | End: 2024-04-11
Payer: MEDICAID

## 2024-04-11 ENCOUNTER — ANESTHESIA (OUTPATIENT)
Dept: OPERATING ROOM | Age: 29
DRG: 263 | End: 2024-04-11
Payer: MEDICAID

## 2024-04-11 ENCOUNTER — ANESTHESIA EVENT (OUTPATIENT)
Dept: OPERATING ROOM | Age: 29
DRG: 263 | End: 2024-04-11
Payer: MEDICAID

## 2024-04-11 VITALS
HEIGHT: 72 IN | WEIGHT: 173 LBS | HEART RATE: 94 BPM | SYSTOLIC BLOOD PRESSURE: 131 MMHG | DIASTOLIC BLOOD PRESSURE: 86 MMHG | RESPIRATION RATE: 18 BRPM | OXYGEN SATURATION: 97 % | TEMPERATURE: 98.2 F | BODY MASS INDEX: 23.43 KG/M2

## 2024-04-11 LAB
ABO + RH BLD: NORMAL
ALBUMIN SERPL-MCNC: 3.7 G/DL (ref 3.5–5.2)
ALP SERPL-CCNC: 83 U/L (ref 40–129)
ALT SERPL-CCNC: 52 U/L (ref 0–40)
ANION GAP SERPL CALCULATED.3IONS-SCNC: 10 MMOL/L (ref 7–16)
ARM BAND NUMBER: NORMAL
AST SERPL-CCNC: 55 U/L (ref 0–39)
BASOPHILS # BLD: 0.04 K/UL (ref 0–0.2)
BASOPHILS NFR BLD: 1 % (ref 0–2)
BILIRUB SERPL-MCNC: 1.6 MG/DL (ref 0–1.2)
BLOOD BANK SAMPLE EXPIRATION: NORMAL
BLOOD GROUP ANTIBODIES SERPL: NEGATIVE
BUN SERPL-MCNC: 9 MG/DL (ref 6–20)
CALCIUM SERPL-MCNC: 8.9 MG/DL (ref 8.6–10.2)
CHLORIDE SERPL-SCNC: 107 MMOL/L (ref 98–107)
CO2 SERPL-SCNC: 23 MMOL/L (ref 22–29)
CREAT SERPL-MCNC: 1 MG/DL (ref 0.7–1.2)
EOSINOPHIL # BLD: 0.18 K/UL (ref 0.05–0.5)
EOSINOPHILS RELATIVE PERCENT: 4 % (ref 0–6)
ERYTHROCYTE [DISTWIDTH] IN BLOOD BY AUTOMATED COUNT: 14.8 % (ref 11.5–15)
GFR SERPL CREATININE-BSD FRML MDRD: >90 ML/MIN/1.73M2
GLUCOSE SERPL-MCNC: 94 MG/DL (ref 74–99)
HCT VFR BLD AUTO: 34.9 % (ref 37–54)
HGB BLD-MCNC: 12.4 G/DL (ref 12.5–16.5)
LIPASE SERPL-CCNC: 49 U/L (ref 13–60)
LYMPHOCYTES NFR BLD: 0.61 K/UL (ref 1.5–4)
LYMPHOCYTES RELATIVE PERCENT: 12 % (ref 20–42)
MCH RBC QN AUTO: 33.8 PG (ref 26–35)
MCHC RBC AUTO-ENTMCNC: 35.5 G/DL (ref 32–34.5)
MCV RBC AUTO: 95.1 FL (ref 80–99.9)
MONOCYTES NFR BLD: 0.44 K/UL (ref 0.1–0.95)
MONOCYTES NFR BLD: 9 % (ref 2–12)
NEUTROPHILS NFR BLD: 75 % (ref 43–80)
NEUTS SEG NFR BLD: 3.73 K/UL (ref 1.8–7.3)
NUCLEATED RED BLOOD CELLS: 2 PER 100 WBC
PLATELET # BLD AUTO: 79 K/UL (ref 130–450)
PLATELET CONFIRMATION: NORMAL
PMV BLD AUTO: 12.5 FL (ref 7–12)
POTASSIUM SERPL-SCNC: 4.1 MMOL/L (ref 3.5–5)
PROT SERPL-MCNC: 6.7 G/DL (ref 6.4–8.3)
RBC # BLD AUTO: 3.67 M/UL (ref 3.8–5.8)
RBC # BLD: ABNORMAL 10*6/UL
RBC # BLD: ABNORMAL 10*6/UL
SODIUM SERPL-SCNC: 140 MMOL/L (ref 132–146)
WBC OTHER # BLD: 5 K/UL (ref 4.5–11.5)

## 2024-04-11 PROCEDURE — 3600000004 HC SURGERY LEVEL 4 BASE: Performed by: SURGERY

## 2024-04-11 PROCEDURE — 2709999900 HC NON-CHARGEABLE SUPPLY: Performed by: SURGERY

## 2024-04-11 PROCEDURE — 2580000003 HC RX 258: Performed by: SURGERY

## 2024-04-11 PROCEDURE — G0378 HOSPITAL OBSERVATION PER HR: HCPCS

## 2024-04-11 PROCEDURE — 47563 LAPARO CHOLECYSTECTOMY/GRAPH: CPT | Performed by: SURGERY

## 2024-04-11 PROCEDURE — C9113 INJ PANTOPRAZOLE SODIUM, VIA: HCPCS | Performed by: SURGERY

## 2024-04-11 PROCEDURE — 80053 COMPREHEN METABOLIC PANEL: CPT

## 2024-04-11 PROCEDURE — 86900 BLOOD TYPING SEROLOGIC ABO: CPT

## 2024-04-11 PROCEDURE — 3700000001 HC ADD 15 MINUTES (ANESTHESIA): Performed by: SURGERY

## 2024-04-11 PROCEDURE — 6360000002 HC RX W HCPCS: Performed by: SURGERY

## 2024-04-11 PROCEDURE — 7100000000 HC PACU RECOVERY - FIRST 15 MIN: Performed by: SURGERY

## 2024-04-11 PROCEDURE — 6360000004 HC RX CONTRAST MEDICATION: Performed by: SURGERY

## 2024-04-11 PROCEDURE — 6360000002 HC RX W HCPCS

## 2024-04-11 PROCEDURE — 6370000000 HC RX 637 (ALT 250 FOR IP): Performed by: SURGERY

## 2024-04-11 PROCEDURE — 2580000003 HC RX 258

## 2024-04-11 PROCEDURE — 6360000002 HC RX W HCPCS: Performed by: ANESTHESIOLOGY

## 2024-04-11 PROCEDURE — 2500000003 HC RX 250 WO HCPCS: Performed by: SURGERY

## 2024-04-11 PROCEDURE — 86901 BLOOD TYPING SEROLOGIC RH(D): CPT

## 2024-04-11 PROCEDURE — 7100000001 HC PACU RECOVERY - ADDTL 15 MIN: Performed by: SURGERY

## 2024-04-11 PROCEDURE — 85025 COMPLETE CBC W/AUTO DIFF WBC: CPT

## 2024-04-11 PROCEDURE — 88304 TISSUE EXAM BY PATHOLOGIST: CPT

## 2024-04-11 PROCEDURE — 2500000003 HC RX 250 WO HCPCS

## 2024-04-11 PROCEDURE — 74300 X-RAY BILE DUCTS/PANCREAS: CPT

## 2024-04-11 PROCEDURE — 0FT44ZZ RESECTION OF GALLBLADDER, PERCUTANEOUS ENDOSCOPIC APPROACH: ICD-10-PCS | Performed by: SURGERY

## 2024-04-11 PROCEDURE — 3700000000 HC ANESTHESIA ATTENDED CARE: Performed by: SURGERY

## 2024-04-11 PROCEDURE — BF131ZZ FLUOROSCOPY OF GALLBLADDER AND BILE DUCTS USING LOW OSMOLAR CONTRAST: ICD-10-PCS | Performed by: SURGERY

## 2024-04-11 PROCEDURE — 96376 TX/PRO/DX INJ SAME DRUG ADON: CPT

## 2024-04-11 PROCEDURE — 83690 ASSAY OF LIPASE: CPT

## 2024-04-11 PROCEDURE — 86850 RBC ANTIBODY SCREEN: CPT

## 2024-04-11 PROCEDURE — 36415 COLL VENOUS BLD VENIPUNCTURE: CPT

## 2024-04-11 PROCEDURE — 3600000014 HC SURGERY LEVEL 4 ADDTL 15MIN: Performed by: SURGERY

## 2024-04-11 PROCEDURE — C1894 INTRO/SHEATH, NON-LASER: HCPCS | Performed by: SURGERY

## 2024-04-11 RX ORDER — SODIUM CHLORIDE, SODIUM LACTATE, POTASSIUM CHLORIDE, CALCIUM CHLORIDE 600; 310; 30; 20 MG/100ML; MG/100ML; MG/100ML; MG/100ML
INJECTION, SOLUTION INTRAVENOUS CONTINUOUS PRN
Status: DISCONTINUED | OUTPATIENT
Start: 2024-04-11 | End: 2024-04-11 | Stop reason: SDUPTHER

## 2024-04-11 RX ORDER — MIDAZOLAM HYDROCHLORIDE 1 MG/ML
INJECTION INTRAMUSCULAR; INTRAVENOUS PRN
Status: DISCONTINUED | OUTPATIENT
Start: 2024-04-11 | End: 2024-04-11 | Stop reason: SDUPTHER

## 2024-04-11 RX ORDER — DIPHENHYDRAMINE HYDROCHLORIDE 50 MG/ML
12.5 INJECTION INTRAMUSCULAR; INTRAVENOUS
Status: DISCONTINUED | OUTPATIENT
Start: 2024-04-11 | End: 2024-04-11 | Stop reason: HOSPADM

## 2024-04-11 RX ORDER — LORAZEPAM 2 MG/ML
0.5 INJECTION INTRAMUSCULAR
Status: DISCONTINUED | OUTPATIENT
Start: 2024-04-11 | End: 2024-04-11 | Stop reason: HOSPADM

## 2024-04-11 RX ORDER — PROPOFOL 10 MG/ML
INJECTION, EMULSION INTRAVENOUS PRN
Status: DISCONTINUED | OUTPATIENT
Start: 2024-04-11 | End: 2024-04-11 | Stop reason: SDUPTHER

## 2024-04-11 RX ORDER — NALOXONE HYDROCHLORIDE 0.4 MG/ML
INJECTION, SOLUTION INTRAMUSCULAR; INTRAVENOUS; SUBCUTANEOUS PRN
Status: DISCONTINUED | OUTPATIENT
Start: 2024-04-11 | End: 2024-04-11 | Stop reason: HOSPADM

## 2024-04-11 RX ORDER — OXYCODONE HYDROCHLORIDE AND ACETAMINOPHEN 5; 325 MG/1; MG/1
1 TABLET ORAL EVERY 6 HOURS PRN
Qty: 28 TABLET | Refills: 0 | Status: SHIPPED | OUTPATIENT
Start: 2024-04-11 | End: 2024-04-18

## 2024-04-11 RX ORDER — LIDOCAINE HYDROCHLORIDE 20 MG/ML
INJECTION, SOLUTION INTRAVENOUS PRN
Status: DISCONTINUED | OUTPATIENT
Start: 2024-04-11 | End: 2024-04-11 | Stop reason: SDUPTHER

## 2024-04-11 RX ORDER — DEXAMETHASONE SODIUM PHOSPHATE 10 MG/ML
INJECTION, SOLUTION INTRAMUSCULAR; INTRAVENOUS PRN
Status: DISCONTINUED | OUTPATIENT
Start: 2024-04-11 | End: 2024-04-11 | Stop reason: SDUPTHER

## 2024-04-11 RX ORDER — KETOROLAC TROMETHAMINE 30 MG/ML
INJECTION, SOLUTION INTRAMUSCULAR; INTRAVENOUS PRN
Status: DISCONTINUED | OUTPATIENT
Start: 2024-04-11 | End: 2024-04-11 | Stop reason: SDUPTHER

## 2024-04-11 RX ORDER — BUPIVACAINE HYDROCHLORIDE AND EPINEPHRINE 2.5; 5 MG/ML; UG/ML
INJECTION, SOLUTION EPIDURAL; INFILTRATION; INTRACAUDAL; PERINEURAL PRN
Status: DISCONTINUED | OUTPATIENT
Start: 2024-04-11 | End: 2024-04-11 | Stop reason: ALTCHOICE

## 2024-04-11 RX ORDER — METHOCARBAMOL 100 MG/ML
INJECTION, SOLUTION INTRAMUSCULAR; INTRAVENOUS
Status: COMPLETED
Start: 2024-04-11 | End: 2024-04-11

## 2024-04-11 RX ORDER — METHOCARBAMOL 100 MG/ML
1000 INJECTION, SOLUTION INTRAMUSCULAR; INTRAVENOUS ONCE
Status: COMPLETED | OUTPATIENT
Start: 2024-04-11 | End: 2024-04-11

## 2024-04-11 RX ORDER — SODIUM CHLORIDE 0.9 % (FLUSH) 0.9 %
5-40 SYRINGE (ML) INJECTION PRN
Status: DISCONTINUED | OUTPATIENT
Start: 2024-04-11 | End: 2024-04-11 | Stop reason: HOSPADM

## 2024-04-11 RX ORDER — SUCCINYLCHOLINE CHLORIDE 20 MG/ML
INJECTION INTRAMUSCULAR; INTRAVENOUS PRN
Status: DISCONTINUED | OUTPATIENT
Start: 2024-04-11 | End: 2024-04-11 | Stop reason: SDUPTHER

## 2024-04-11 RX ORDER — ONDANSETRON 2 MG/ML
INJECTION INTRAMUSCULAR; INTRAVENOUS PRN
Status: DISCONTINUED | OUTPATIENT
Start: 2024-04-11 | End: 2024-04-11 | Stop reason: SDUPTHER

## 2024-04-11 RX ORDER — SODIUM CHLORIDE 0.9 % (FLUSH) 0.9 %
5-40 SYRINGE (ML) INJECTION EVERY 12 HOURS SCHEDULED
Status: DISCONTINUED | OUTPATIENT
Start: 2024-04-11 | End: 2024-04-11 | Stop reason: HOSPADM

## 2024-04-11 RX ORDER — ROCURONIUM BROMIDE 10 MG/ML
INJECTION, SOLUTION INTRAVENOUS PRN
Status: DISCONTINUED | OUTPATIENT
Start: 2024-04-11 | End: 2024-04-11 | Stop reason: SDUPTHER

## 2024-04-11 RX ORDER — SODIUM CHLORIDE 9 MG/ML
INJECTION, SOLUTION INTRAVENOUS PRN
Status: DISCONTINUED | OUTPATIENT
Start: 2024-04-11 | End: 2024-04-11 | Stop reason: HOSPADM

## 2024-04-11 RX ORDER — MEPERIDINE HYDROCHLORIDE 25 MG/ML
12.5 INJECTION INTRAMUSCULAR; INTRAVENOUS; SUBCUTANEOUS EVERY 5 MIN PRN
Status: DISCONTINUED | OUTPATIENT
Start: 2024-04-11 | End: 2024-04-11 | Stop reason: HOSPADM

## 2024-04-11 RX ORDER — FENTANYL CITRATE 50 UG/ML
INJECTION, SOLUTION INTRAMUSCULAR; INTRAVENOUS PRN
Status: DISCONTINUED | OUTPATIENT
Start: 2024-04-11 | End: 2024-04-11 | Stop reason: SDUPTHER

## 2024-04-11 RX ORDER — ONDANSETRON 2 MG/ML
4 INJECTION INTRAMUSCULAR; INTRAVENOUS
Status: DISCONTINUED | OUTPATIENT
Start: 2024-04-11 | End: 2024-04-11 | Stop reason: HOSPADM

## 2024-04-11 RX ORDER — KETOROLAC TROMETHAMINE 30 MG/ML
30 INJECTION, SOLUTION INTRAMUSCULAR; INTRAVENOUS ONCE
Status: DISCONTINUED | OUTPATIENT
Start: 2024-04-11 | End: 2024-04-11 | Stop reason: HOSPADM

## 2024-04-11 RX ORDER — FENTANYL CITRATE 0.05 MG/ML
50 INJECTION, SOLUTION INTRAMUSCULAR; INTRAVENOUS EVERY 5 MIN PRN
Status: DISCONTINUED | OUTPATIENT
Start: 2024-04-11 | End: 2024-04-11 | Stop reason: HOSPADM

## 2024-04-11 RX ADMIN — KETOROLAC TROMETHAMINE 30 MG: 30 INJECTION, SOLUTION INTRAMUSCULAR at 11:42

## 2024-04-11 RX ADMIN — ROCURONIUM BROMIDE 20 MG: 10 INJECTION, SOLUTION INTRAVENOUS at 11:17

## 2024-04-11 RX ADMIN — FENTANYL CITRATE 100 MCG: 50 INJECTION, SOLUTION INTRAMUSCULAR; INTRAVENOUS at 11:05

## 2024-04-11 RX ADMIN — HYDROXYZINE PAMOATE 25 MG: 25 CAPSULE ORAL at 14:43

## 2024-04-11 RX ADMIN — MIDAZOLAM 1 MG: 1 INJECTION INTRAMUSCULAR; INTRAVENOUS at 11:05

## 2024-04-11 RX ADMIN — MORPHINE SULFATE 2 MG: 2 INJECTION, SOLUTION INTRAMUSCULAR; INTRAVENOUS at 03:06

## 2024-04-11 RX ADMIN — FENTANYL CITRATE 50 MCG: 50 INJECTION, SOLUTION INTRAMUSCULAR; INTRAVENOUS at 11:26

## 2024-04-11 RX ADMIN — ROCURONIUM BROMIDE 10 MG: 10 INJECTION, SOLUTION INTRAVENOUS at 11:05

## 2024-04-11 RX ADMIN — CLONIDINE HYDROCHLORIDE 0.1 MG: 0.1 TABLET ORAL at 13:53

## 2024-04-11 RX ADMIN — METHOCARBAMOL 1000 MG: 100 INJECTION, SOLUTION INTRAMUSCULAR; INTRAVENOUS at 11:55

## 2024-04-11 RX ADMIN — HYDROMORPHONE HYDROCHLORIDE 0.5 MG: 0.5 INJECTION, SOLUTION INTRAMUSCULAR; INTRAVENOUS; SUBCUTANEOUS at 11:55

## 2024-04-11 RX ADMIN — LIDOCAINE HYDROCHLORIDE 100 MG: 20 INJECTION, SOLUTION INTRAVENOUS at 11:05

## 2024-04-11 RX ADMIN — HYDROXYZINE PAMOATE 25 MG: 25 CAPSULE ORAL at 01:18

## 2024-04-11 RX ADMIN — SUGAMMADEX 200 MG: 100 INJECTION, SOLUTION INTRAVENOUS at 11:42

## 2024-04-11 RX ADMIN — OXYCODONE 5 MG: 5 TABLET ORAL at 13:53

## 2024-04-11 RX ADMIN — SODIUM CHLORIDE, PRESERVATIVE FREE 40 MG: 5 INJECTION INTRAVENOUS at 09:00

## 2024-04-11 RX ADMIN — HYDROMORPHONE HYDROCHLORIDE 0.5 MG: 0.5 INJECTION, SOLUTION INTRAMUSCULAR; INTRAVENOUS; SUBCUTANEOUS at 12:06

## 2024-04-11 RX ADMIN — SUCCINYLCHOLINE CHLORIDE 160 MG: 20 INJECTION, SOLUTION INTRAMUSCULAR; INTRAVENOUS at 11:06

## 2024-04-11 RX ADMIN — MORPHINE SULFATE 4 MG: 4 INJECTION, SOLUTION INTRAMUSCULAR; INTRAVENOUS at 08:59

## 2024-04-11 RX ADMIN — DEXAMETHASONE SODIUM PHOSPHATE 10 MG: 10 INJECTION, SOLUTION INTRAMUSCULAR; INTRAVENOUS at 11:08

## 2024-04-11 RX ADMIN — MIDAZOLAM 1 MG: 1 INJECTION INTRAMUSCULAR; INTRAVENOUS at 10:57

## 2024-04-11 RX ADMIN — PROPOFOL 200 MG: 10 INJECTION, EMULSION INTRAVENOUS at 11:05

## 2024-04-11 RX ADMIN — METRONIDAZOLE 500 MG: 500 INJECTION, SOLUTION INTRAVENOUS at 05:35

## 2024-04-11 RX ADMIN — FENTANYL CITRATE 50 MCG: 50 INJECTION, SOLUTION INTRAMUSCULAR; INTRAVENOUS at 11:21

## 2024-04-11 RX ADMIN — METHOCARBAMOL 1000 MG: 100 INJECTION INTRAMUSCULAR; INTRAVENOUS at 11:55

## 2024-04-11 RX ADMIN — ONDANSETRON 4 MG: 2 INJECTION INTRAMUSCULAR; INTRAVENOUS at 01:16

## 2024-04-11 RX ADMIN — METRONIDAZOLE 500 MG: 500 INJECTION, SOLUTION INTRAVENOUS at 13:55

## 2024-04-11 RX ADMIN — SODIUM CHLORIDE, POTASSIUM CHLORIDE, SODIUM LACTATE AND CALCIUM CHLORIDE: 600; 310; 30; 20 INJECTION, SOLUTION INTRAVENOUS at 10:57

## 2024-04-11 RX ADMIN — ONDANSETRON 4 MG: 2 INJECTION INTRAMUSCULAR; INTRAVENOUS at 11:36

## 2024-04-11 ASSESSMENT — PAIN DESCRIPTION - DESCRIPTORS
DESCRIPTORS: ACHING;DISCOMFORT
DESCRIPTORS: ACHING;THROBBING;SHARP;SHOOTING
DESCRIPTORS: ACHING;DISCOMFORT
DESCRIPTORS: ACHING;SORE;TENDER
DESCRIPTORS: ACHING;DISCOMFORT
DESCRIPTORS: ACHING;SORE;TENDER

## 2024-04-11 ASSESSMENT — PAIN SCALES - GENERAL
PAINLEVEL_OUTOF10: 5
PAINLEVEL_OUTOF10: 6
PAINLEVEL_OUTOF10: 7
PAINLEVEL_OUTOF10: 6
PAINLEVEL_OUTOF10: 10
PAINLEVEL_OUTOF10: 7

## 2024-04-11 ASSESSMENT — PAIN DESCRIPTION - FREQUENCY: FREQUENCY: CONTINUOUS

## 2024-04-11 ASSESSMENT — PAIN DESCRIPTION - PAIN TYPE
TYPE: SURGICAL PAIN

## 2024-04-11 ASSESSMENT — PAIN DESCRIPTION - ORIENTATION
ORIENTATION: MID
ORIENTATION: UPPER;RIGHT;MID
ORIENTATION: MID
ORIENTATION: MID
ORIENTATION: MID;UPPER
ORIENTATION: UPPER;MID

## 2024-04-11 ASSESSMENT — PAIN DESCRIPTION - LOCATION
LOCATION: ABDOMEN
LOCATION: ABDOMEN;HEAD
LOCATION: ABDOMEN
LOCATION: ABDOMEN

## 2024-04-11 ASSESSMENT — LIFESTYLE VARIABLES: SMOKING_STATUS: 1

## 2024-04-11 ASSESSMENT — PAIN DESCRIPTION - ONSET: ONSET: ON-GOING

## 2024-04-11 ASSESSMENT — PAIN - FUNCTIONAL ASSESSMENT: PAIN_FUNCTIONAL_ASSESSMENT: ACTIVITIES ARE NOT PREVENTED

## 2024-04-11 NOTE — OP NOTE
Operative Note      Patient: Justo Chester  YOB: 1995  MRN: [id number]    Date of Procedure: 4/11/2024    Pre-Op Diagnosis Codes:     * Cholecystitis [K81.9]    Post-Op Diagnosis: Same       Procedure(s):  LAPAROSCOPIC CHOLECYSTECTOMY WITH INTRAOPERATIVE CHOLANGIOGRAM    Surgeon(s):  Gladis Garcia MD    Assistant:   First Assistant: Mahogany Tomlin    Anesthesia: General    Estimated Blood Loss (mL): less than 50     Complications: None    Specimens:   ID Type Source Tests Collected by Time Destination   A : GALLBLADDER AND CONTENTS Tissue Gallbladder SURGICAL PATHOLOGY Gladis Garcia MD 4/11/2024 1138        Implants:  * No implants in log *      Drains:   [REMOVED] NG/OG/NJ/NE Tube Orogastric 18 fr Right mouth (Removed)       Findings:  Infection Present At Time Of Surgery (PATOS) (choose all levels that have infection present):  No infection present  Other Findings: see below    Detailed Description of Procedure:       INDICATION:  Justo Chester is a 29 y.o. male who presented     for evaluation of right upper quadrant abdominal pain consistent with  Acute cholecystitis. We explained the risks, benefits, potential outcomes, and   alternatives of treatment to the aforementioned procedure, including risk of   potential biliary leak or bile duct injury requiring further surgeries, infection or   bleeding requiring procedure or surgeries.  He agreed to proceed   understanding those risks and potential outcomes.      PROCEDURE:  The patient was brought into the operative suite and was given   preoperative antibiotics 30 minutes before incision, was placed under general   anesthesia, and then was prepped and draped in normal sterile condition.      Once this was done, a 5-mm incision was made supraumbilically and a Veress   needle was passed through this incision into the peritoneum.  Once this was done, CO2 was used to insufflate the abdomen to a pressure of 15 mmHg.  At that time, the Veress needle

## 2024-04-11 NOTE — PLAN OF CARE
Problem: Discharge Planning  Goal: Discharge to home or other facility with appropriate resources  4/11/2024 1040 by Korin Santoyo RN  Outcome: Progressing  4/11/2024 0200 by Tonya Clark RN  Outcome: Progressing     Problem: Pain  Goal: Verbalizes/displays adequate comfort level or baseline comfort level  4/11/2024 1040 by Korin Santoyo RN  Outcome: Progressing  4/11/2024 0200 by Tonya Clark RN  Outcome: Progressing     Problem: Gastrointestinal - Adult  Goal: Minimal or absence of nausea and vomiting  4/11/2024 1040 by Korin Santoyo, RN  Outcome: Progressing  4/11/2024 0200 by Tonya Clark RN  Outcome: Progressing  Goal: Maintains or returns to baseline bowel function  4/11/2024 1040 by Korin Santoyo RN  Outcome: Progressing  4/11/2024 0200 by Tonya Clark RN  Outcome: Progressing

## 2024-04-11 NOTE — DISCHARGE INSTRUCTIONS
Your information:  Name: Justo Chester  : 1995      POST-OPERATIVE INSTRUCTIONS FOR GALLBLADDER SURGERY    Call the office at  to schedule your post-operative appointment with Dr. Garcia for two (2) weeks.     You will have surgical glue closing your incisions, you may shower 24hours after your surgery but do not rub off glue, it will come off on its own over time.    Do not bathe for 3 days after your surgery, shower only and pat incisions dry after shower.    General guidelines for activity:   Avoid strenuous activity or lifting anything heavier than 15 pounds for 4 weeks.    It is OK to be up, walking around, and walking up and down stairs.     Do what is comfortable: stop and rest when you feel tired.   Call 911 anytime you think you may need emergency care. For example, call if:    You passed out (lost consciousness).     You are short of breath.   Call your doctor now or seek immediate medical care if:    You are sick to your stomach and cannot drink fluids.     You have pain that does not get better when you take your pain medicine.     You cannot pass stools or gas.     You have signs of infection, such as:  Increased pain, swelling, warmth, or redness.  Red streaks leading from the incision.  Pus draining from the incision.  A fever.     Bright red blood has soaked through the bandage over your incision.     You have loose stitches, or your incision comes open.     You have signs of a blood clot in your leg (called a deep vein thrombosis), such as:  Pain in your calf, back of knee, thigh, or groin.  Redness and swelling in your leg or groin.   Watch closely for any changes in your health, and be sure to contact your doctor if you have any problems.    Drink plenty of fluids and stay on a bland diet for 2-3 days after surgery.     Do NOT drive for one week and while taking your narcotic pain medicine.     Watch for signs of infection:  Excessive warmth or bright redness around your  incisions  Leakage of bloody or cloudy fluid from you incisions  Fever over 100.5  Call 911 anytime you think you may need emergency care. For example, call if:    You passed out (lost consciousness).     You are short of breath.   Call your doctor now or seek immediate medical care if:    You are sick to your stomach and cannot drink fluids.     You have pain that does not get better when you take your pain medicine.     You cannot pass stools or gas.     You have signs of infection, such as:  Increased pain, swelling, warmth, or redness.  Red streaks leading from the incision.  Pus draining from the incision.  A fever.     Bright red blood has soaked through the bandage over your incision.     You have loose stitches, or your incision comes open.     You have signs of a blood clot in your leg (called a deep vein thrombosis), such as:  Pain in your calf, back of knee, thigh, or groin.  Redness and swelling in your leg or groin.   Watch closely for any changes in your health, and be sure to contact your doctor if you have any problems.  During the laparoscopic procedure that you had, gas is pumped into the abdominal cavity.  You may feel abdominal, shoulder, or rib pain for a few days due to this gas.    You will have pain medicine ordered. Take as directed.     BOWELS: constipation is a side effect of your pain meds, take a daily laxative (miralax, dulcolax, etc.) as needed to keep your bowels moving as they normally do, do not go 2-3 days without having a bowel movement.      Pain medications;   Percocet- take at least 1/2 pill every 6 hours the first 36 hours after surgery, and may take as many as 2 pills every 4 hours. After the first 36hours only take the pills as needed and stop them as soon as possible. Pain meds cause constipation       The following personal items were collected during your admission and were returned to you:    Belongings  Dental Appliances: None  Vision - Corrective Lenses: None  Hearing

## 2024-04-11 NOTE — ANESTHESIA POSTPROCEDURE EVALUATION
Department of Anesthesiology  Postprocedure Note    Patient: Justo Chester  MRN: [id number]  YOB: 1995  Date of evaluation: 4/11/2024    Procedure Summary       Date: 04/11/24 Room / Location: [address]    Anesthesia Start: 1057 Anesthesia Stop: 1152    Procedure: LAPAROSCOPIC CHOLECYSTECTOMY WITH INTRAOPERATIVE CHOLANGIOGRAM (Abdomen) Diagnosis:       Cholecystitis      (Cholecystitis [K81.9])    Surgeons: Gladis Garcia MD Responsible Provider: Chuy Agustin MD    Anesthesia Type: General ASA Status: 2            Anesthesia Type: General    Vinay Phase I: Vinay Score: 10    Vinay Phase II:      Anesthesia Post Evaluation    Patient location during evaluation: PACU  Patient participation: complete - patient participated  Level of consciousness: awake and alert  Pain score: 3  Airway patency: patent  Nausea & Vomiting: no nausea  Cardiovascular status: blood pressure returned to baseline  Respiratory status: acceptable  Hydration status: euvolemic  Pain management: adequate    No notable events documented.

## 2024-04-11 NOTE — DISCHARGE SUMMARY
Physician Discharge Summary     Patient ID:  Justo Chester  [id number]  29 y.o.  1995    Admit date: 4/9/2024    Discharge date and time: 4/11/2024    Admitting Physician: Gladis Garcia MD     Admission Diagnoses: Acute cholecystitis [K81.0]    Discharge Diagnoses: Principal Problem:    Acute cholecystitis  Resolved Problems:    * No resolved hospital problems. *      Admission Condition: stable    Discharged Condition: stable    Indication for Admission: acute cholecystitis     Hospital Course/Procedures/Operation/treatments:     Admitted with acute cholecystitis.  Placed on IV antibiotics.  Underwent laparoscopic cholecystectomy with normal intraoperative cholangiogram on 4/11/2024.  Discharge postop.  Follow-up 2 weeks          Consults:   IP CONSULT TO GENERAL SURGERY    Significant Diagnostic Studies:   US GALLBLADDER RUQ    Result Date: 4/9/2024  EXAMINATION: RIGHT UPPER QUADRANT ULTRASOUND 4/9/2024 8:16 pm COMPARISON: None. HISTORY: ORDERING SYSTEM PROVIDED HISTORY: RUQ pain/ttp, r/o cholecystitis TECHNOLOGIST PROVIDED HISTORY: Reason for exam:->RUQ pain/ttp, r/o cholecystitis What reading provider will be dictating this exam?->CRC FINDINGS: LIVER:  The liver demonstrates diffusely increased echogenicity without evidence of intrahepatic biliary ductal dilatation. BILIARY SYSTEM:  Gallbladder wall thickening associated with echogenic foci and shadowing of cholelithiasis and gallbladder wall thickening of inflammation positive sonographic Messer's sign. Common bile duct is within normal limits measuring 5 mm. RIGHT KIDNEY: The right kidney is grossly unremarkable without evidence of hydronephrosis. PANCREAS:  Visualized portions of the pancreas are unremarkable. OTHER: No evidence of right upper quadrant ascites.     1. Cholelithiasis with gallbladder wall thickening and positive sonographic Messer's sign concerning for acute cholecystitis. 2. Fatty infiltration of the liver.     XR CHEST PORTABLE    Result  Date: 4/9/2024  EXAMINATION: ONE XRAY VIEW OF THE CHEST 4/9/2024 8:04 pm COMPARISON: None. HISTORY: ORDERING SYSTEM PROVIDED HISTORY: chest pain TECHNOLOGIST PROVIDED HISTORY: Reason for exam:->chest pain FINDINGS: Cardiac size within normal limits.  Mild prominence of perihilar lung markings could represent mild central congestion versus peribronchial inflammatory process without separate consolidation.  No overt edema or effusion.  No pneumothorax     Mild prominence of perihilar lung markings could represent mild central congestion versus peribronchial inflammatory process without separate consolidation.       General appearance:  lying in bed, NAD  Head: NCAT, PERRL, EOMI, red conjunctiva  Neck: supple, no masses, trachea midline  Lungs: symmetric chest rise, no audible wheezes  Heart: warm throughout  Abdomen: soft, non-distended, mild to moderate epigastric tenderness  Skin: warm and dry, no cyanosis  Gu: no cva tenderness  Extremities: atraumatic, no focal motor deficits, no open wounds  Psych: no tremor, visual hallucinations       Disposition: home    In process/preliminary results:  Outstanding Order Results       No orders found from 3/11/2024 to 4/10/2024.            Patient Instructions:   Current Discharge Medication List             Details   oxyCODONE-acetaminophen (PERCOCET) 5-325 MG per tablet Take 1 tablet by mouth every 6 hours as needed for Pain for up to 7 days. Intended supply: 7 days. Take lowest dose possible to manage pain Max Daily Amount: 4 tablets  Qty: 28 tablet, Refills: 0    Comments: Reduce doses taken as pain becomes manageable  Associated Diagnoses: Acute cholecystitis      !! ibuprofen (ADVIL;MOTRIN) 800 MG tablet Take 1 tablet by mouth every 8 hours as needed for Pain  Qty: 30 tablet, Refills: 0       !! - Potential duplicate medications found. Please discuss with provider.             Details   doxycycline hyclate (VIBRAMYCIN) 100 MG capsule Take 1 capsule by mouth 2 times daily

## 2024-04-11 NOTE — ANESTHESIA PRE PROCEDURE
Department of Anesthesiology  Preprocedure Note       Name:  Justo Chester   Age:  29 y.o.  :  1995                                          MRN:  [id number]         Date:  2024      Surgeon: Surgeon(s):  Gladis Garcia MD    Procedure: Procedure(s):  LAPAROSCOPIC CHOLECYSTECTOMY WITH INTRAOPERATIVE CHOLANGIOGRAM    Medications prior to admission:   Prior to Admission medications    Medication Sig Start Date End Date Taking? Authorizing Provider   ibuprofen (ADVIL;MOTRIN) 800 MG tablet Take 1 tablet by mouth every 8 hours as needed for Pain 4/10/24 4/20/24 Yes Gladis Garcia MD   doxycycline hyclate (VIBRAMYCIN) 100 MG capsule Take 1 capsule by mouth 2 times daily   Yes ProviderArya MD   cetirizine (ZYRTEC) 10 MG tablet Take 1 tablet by mouth daily as needed for Allergies   Yes ProviderArya MD   guaiFENesin (MUCINEX) 600 MG extended release tablet Take 2 tablets by mouth 2 times daily as needed for Congestion   Yes ProviderArya MD   ibuprofen (ADVIL;MOTRIN) 800 MG tablet Take 1 tablet by mouth every 8 hours as needed for Pain   Yes Provider, MD Arya   Melatonin 10 MG TABS Take 10 mg by mouth nightly as needed (sleep)   Yes ProviderArya MD   traZODone (DESYREL) 50 MG tablet Take 2 tablets by mouth nightly as needed for Sleep   Yes ProviderArya MD   furosemide (LASIX) 40 MG tablet Take 1 tablet by mouth daily   Yes ProviderArya MD   spironolactone (ALDACTONE) 100 MG tablet Take 1 tablet by mouth daily   Yes Arya Silva MD   ARIPiprazole (ABILIFY) 5 MG tablet Take 1 tablet by mouth daily    Arya Silva MD   cloNIDine (CATAPRES) 0.1 MG tablet Take 1 tablet by mouth in the morning, at noon, and at bedtime    ProviderArya MD   hydrOXYzine pamoate (VISTARIL) 25 MG capsule Take 1 capsule by mouth every 6 hours as needed for Anxiety    ProviderArya MD   QUEtiapine (SEROQUEL) 100 MG tablet Take 1 tablet by mouth

## 2024-04-11 NOTE — PROGRESS NOTES
Discharge order noted. Call placed to [facility] to arrange transport for patient to return to facility. Per facility, patient may need readmitted therefore may not be able to be accepted back tonight. Admissions nurse to call floor back.

## 2024-04-11 NOTE — PLAN OF CARE
Problem: Discharge Planning  Goal: Discharge to home or other facility with appropriate resources  4/11/2024 0200 by Tonya Clark RN  Outcome: Progressing  4/10/2024 1908 by Garth Catalan RN  Outcome: Progressing     Problem: Pain  Goal: Verbalizes/displays adequate comfort level or baseline comfort level  4/11/2024 0200 by Tonya Clark RN  Outcome: Progressing  4/10/2024 1908 by Garth Catalan RN  Outcome: Progressing     Problem: Gastrointestinal - Adult  Goal: Minimal or absence of nausea and vomiting  4/11/2024 0200 by Tonya Clark RN  Outcome: Progressing  4/10/2024 1908 by Garth Catalan RN  Outcome: Progressing  Goal: Maintains or returns to baseline bowel function  4/11/2024 0200 by Tonya Clark RN  Outcome: Progressing  4/10/2024 1908 by Garth Catalan RN  Outcome: Progressing

## 2024-04-12 ENCOUNTER — HOSPITAL ENCOUNTER (EMERGENCY)
Age: 29
Discharge: HOME | End: 2024-04-12
Payer: MEDICAID

## 2024-04-12 ENCOUNTER — APPOINTMENT (OUTPATIENT)
Dept: CT IMAGING | Age: 29
End: 2024-04-12
Payer: MEDICAID

## 2024-04-12 VITALS
SYSTOLIC BLOOD PRESSURE: 149 MMHG | TEMPERATURE: 97.6 F | RESPIRATION RATE: 16 BRPM | DIASTOLIC BLOOD PRESSURE: 91 MMHG | OXYGEN SATURATION: 98 % | HEART RATE: 74 BPM

## 2024-04-12 DIAGNOSIS — Z48.89 ENCOUNTER FOR EXAMINATION OF SURGICAL SITE: Primary | ICD-10-CM

## 2024-04-12 LAB
ANION GAP SERPL CALCULATED.3IONS-SCNC: 13 MMOL/L (ref 7–16)
BASOPHILS # BLD: 0 K/UL (ref 0–0.2)
BASOPHILS NFR BLD: 0 % (ref 0–2)
BUN SERPL-MCNC: 14 MG/DL (ref 6–20)
CALCIUM SERPL-MCNC: 9.2 MG/DL (ref 8.6–10.2)
CHLORIDE SERPL-SCNC: 103 MMOL/L (ref 98–107)
CO2 SERPL-SCNC: 21 MMOL/L (ref 22–29)
CREAT SERPL-MCNC: 0.8 MG/DL (ref 0.7–1.2)
EOSINOPHIL # BLD: 0 K/UL (ref 0.05–0.5)
EOSINOPHILS RELATIVE PERCENT: 0 % (ref 0–6)
ERYTHROCYTE [DISTWIDTH] IN BLOOD BY AUTOMATED COUNT: 14.5 % (ref 11.5–15)
GFR SERPL CREATININE-BSD FRML MDRD: >90 ML/MIN/1.73M2
GLUCOSE SERPL-MCNC: 124 MG/DL (ref 74–99)
HCT VFR BLD AUTO: 35.6 % (ref 37–54)
HGB BLD-MCNC: 13 G/DL (ref 12.5–16.5)
LYMPHOCYTES NFR BLD: 1.1 K/UL (ref 1.5–4)
LYMPHOCYTES RELATIVE PERCENT: 8 % (ref 20–42)
MCH RBC QN AUTO: 34 PG (ref 26–35)
MCHC RBC AUTO-ENTMCNC: 36.5 G/DL (ref 32–34.5)
MCV RBC AUTO: 93.2 FL (ref 80–99.9)
MONOCYTES NFR BLD: 1.34 K/UL (ref 0.1–0.95)
MONOCYTES NFR BLD: 10 % (ref 2–12)
NEUTROPHILS NFR BLD: 83 % (ref 43–80)
NEUTS SEG NFR BLD: 11.57 K/UL (ref 1.8–7.3)
PLATELET # BLD AUTO: 71 K/UL (ref 130–450)
PLATELET CONFIRMATION: NORMAL
PMV BLD AUTO: 12.4 FL (ref 7–12)
POTASSIUM SERPL-SCNC: 4.5 MMOL/L (ref 3.5–5)
RBC # BLD AUTO: 3.82 M/UL (ref 3.8–5.8)
RBC # BLD: NORMAL 10*6/UL
SODIUM SERPL-SCNC: 137 MMOL/L (ref 132–146)
WBC OTHER # BLD: 14 K/UL (ref 4.5–11.5)

## 2024-04-12 PROCEDURE — 6370000000 HC RX 637 (ALT 250 FOR IP): Performed by: PHYSICIAN ASSISTANT

## 2024-04-12 PROCEDURE — 74177 CT ABD & PELVIS W/CONTRAST: CPT

## 2024-04-12 PROCEDURE — 6360000004 HC RX CONTRAST MEDICATION: Performed by: RADIOLOGY

## 2024-04-12 PROCEDURE — 80048 BASIC METABOLIC PNL TOTAL CA: CPT

## 2024-04-12 PROCEDURE — 99285 EMERGENCY DEPT VISIT HI MDM: CPT

## 2024-04-12 PROCEDURE — 85025 COMPLETE CBC W/AUTO DIFF WBC: CPT

## 2024-04-12 RX ORDER — ACETAMINOPHEN 325 MG/1
650 TABLET ORAL ONCE
Status: COMPLETED | OUTPATIENT
Start: 2024-04-12 | End: 2024-04-12

## 2024-04-12 RX ADMIN — IOPAMIDOL 75 ML: 755 INJECTION, SOLUTION INTRAVENOUS at 18:07

## 2024-04-12 RX ADMIN — ACETAMINOPHEN 650 MG: 500 TABLET ORAL at 16:32

## 2024-04-12 ASSESSMENT — PAIN SCALES - GENERAL: PAINLEVEL_OUTOF10: 8

## 2024-04-12 NOTE — PROGRESS NOTES
CLINICAL PHARMACY NOTE: MEDS TO BEDS    Total # of Prescriptions Filled: 2   The following medications were delivered to the patient:  Percocet 5-325 mg  Ibuprofen 800 mg    Additional Documentation:

## 2024-04-12 NOTE — ED PROVIDER NOTES
Independent JOHNSON Visit.     [facility]  Department of Emergency Medicine   ED  Encounter Note  Admit Date/RoomTime: 2024  3:23 PM  ED Room:   NAME: Justo Chester  : 1995  MRN: [id number]     Chief Complaint:  Abdominal Pain (Reports he had gallbladder removed here yesterday, bleeding from site started today, [facility])    HISTORY OF PRESENT ILLNESS        Justo Chester is a 29 y.o. male who presents to the ED with bleeding from his surgical site.  Patient had laparoscopic cholecystectomy performed just yesterday  by Dr. Garcia.  Presents to the ER today because 1 incision by his bellybutton is bleeding.  He denies any fevers or chills.  Denies nausea or vomiting.  States he had a good bowel movement this morning.  He did strain however on the toilet.  Denies dysuria.  Patient states he noted blood on the dressing and was sent in for reevaluation.  Symptoms are mild in severity.      ROS   Pertinent positives and negatives are stated within HPI, all other systems reviewed and are negative.    Past Medical History:  has a past medical history of Anxiety and Depression.    Surgical History:  has a past surgical history that includes skin biopsy and Cholecystectomy, laparoscopic (N/A, 2024).    Social History:  reports that he has been smoking cigarettes. He has never used smokeless tobacco. He reports current alcohol use of about 5.0 standard drinks of alcohol per week. He reports current drug use. Drug: Marijuana (Weed).    Family History: family history is not on file.     Allergies: Patient has no known allergies.    PHYSICAL EXAM   Oxygen Saturation Interpretation: Normal on room air analysis.        ED Triage Vitals   BP Temp Temp src Pulse Respirations SpO2 Height Weight   24 1416 24 1334 -- 24 1334 24 1416 24 1334 -- --   (!) 149/91 97.6 °F (36.4 °C)  90 16 99 %           General:  NAD.  Alert and Oriented.  Well-appearing.  Skin:

## 2024-04-15 ENCOUNTER — OFFICE VISIT (OUTPATIENT)
Dept: SURGERY | Age: 29
End: 2024-04-15

## 2024-04-15 VITALS
HEART RATE: 92 BPM | WEIGHT: 173 LBS | BODY MASS INDEX: 23.43 KG/M2 | TEMPERATURE: 98.4 F | SYSTOLIC BLOOD PRESSURE: 130 MMHG | HEIGHT: 72 IN | DIASTOLIC BLOOD PRESSURE: 77 MMHG

## 2024-04-15 DIAGNOSIS — R18.8 OTHER ASCITES: Primary | ICD-10-CM

## 2024-04-15 PROCEDURE — 99024 POSTOP FOLLOW-UP VISIT: CPT | Performed by: SURGERY

## 2024-04-15 RX ORDER — SULFAMETHOXAZOLE AND TRIMETHOPRIM 800; 160 MG/1; MG/1
1 TABLET ORAL 2 TIMES DAILY
Qty: 20 TABLET | Refills: 0 | Status: SHIPPED | OUTPATIENT
Start: 2024-04-15 | End: 2024-04-25

## 2024-04-15 NOTE — PROGRESS NOTES
Surgery Progress Note            Chief complaint:   Patient Active Problem List   Diagnosis    Cholecystitis    Marijuana use    Alcohol use    Acute cholecystitis       S: doing well    O:   Vitals:    04/15/24 1403   BP: 130/77   Pulse: 92   Temp: 98.4 °F (36.9 °C)     No intake or output data in the 24 hours ending 04/15/24 1418        Labs:  Recent Labs     04/12/24  1600   WBC 14.0*   HGB 13.0   HCT 35.6*     Lab Results   Component Value Date    CREATININE 0.8 04/12/2024    BUN 14 04/12/2024     04/12/2024    K 4.5 04/12/2024     04/12/2024    CO2 21 (L) 04/12/2024     No results for input(s): \"LIPASE\", \"AMYLASE\" in the last 72 hours.    Physical exam:   /77 (Site: Left Upper Arm, Position: Sitting, Cuff Size: Medium Adult)   Pulse 92   Temp 98.4 °F (36.9 °C)   Ht 1.829 m (6')   Wt 78.5 kg (173 lb)   BMI 23.46 kg/m²   General appearance: NAD  Head: NCAT  Neck: supple, no masses  Lungs: equal chest rise bilateral  Heart: S1S2 present  Abdomen: soft, nontender, nondistended  Skin; no new lesions, incisions clean and intact  Gu: no cva tenderness  Extremities: extremities normal, atraumatic, no cyanosis or edema    A:  Post op lap chin with ascites drainage from periumbilical incision    P: will get paracentesis and give heavy drainage pads and  follow up after    Jorge Currie MD, MD  4/15/2024

## 2024-04-16 LAB — SURGICAL PATHOLOGY REPORT: NORMAL

## 2024-05-23 ENCOUNTER — APPOINTMENT (OUTPATIENT)
Dept: GENERAL RADIOLOGY | Age: 29
End: 2024-05-23
Payer: MEDICAID

## 2024-05-23 ENCOUNTER — HOSPITAL ENCOUNTER (EMERGENCY)
Age: 29
Discharge: PSYCHIATRIC OR SUBSTANCE USE FACILITY | End: 2024-05-24
Attending: EMERGENCY MEDICINE
Payer: MEDICAID

## 2024-05-23 ENCOUNTER — APPOINTMENT (OUTPATIENT)
Dept: CT IMAGING | Age: 29
End: 2024-05-23
Payer: MEDICAID

## 2024-05-23 DIAGNOSIS — F39 MOOD DISORDER (HCC): ICD-10-CM

## 2024-05-23 DIAGNOSIS — E87.6 HYPOKALEMIA: ICD-10-CM

## 2024-05-23 DIAGNOSIS — E87.20 LACTIC ACIDOSIS: ICD-10-CM

## 2024-05-23 DIAGNOSIS — Z59.00 HOMELESS: ICD-10-CM

## 2024-05-23 DIAGNOSIS — R45.851 SUICIDAL IDEATIONS: Primary | ICD-10-CM

## 2024-05-23 DIAGNOSIS — R18.8 OTHER ASCITES: ICD-10-CM

## 2024-05-23 LAB
ALBUMIN SERPL-MCNC: 3.6 G/DL (ref 3.5–5.2)
ALP SERPL-CCNC: 183 U/L (ref 40–129)
ALT SERPL-CCNC: 98 U/L (ref 0–40)
AMMONIA PLAS-SCNC: 82 UMOL/L (ref 16–60)
AMPHET UR QL SCN: NEGATIVE
ANION GAP SERPL CALCULATED.3IONS-SCNC: 17 MMOL/L (ref 7–16)
APAP SERPL-MCNC: <5 UG/ML (ref 10–30)
AST SERPL-CCNC: 114 U/L (ref 0–39)
BACTERIA URNS QL MICRO: ABNORMAL
BARBITURATES UR QL SCN: POSITIVE
BASOPHILS # BLD: 0.06 K/UL (ref 0–0.2)
BASOPHILS NFR BLD: 1 % (ref 0–2)
BENZODIAZ UR QL: POSITIVE
BILIRUB SERPL-MCNC: 0.6 MG/DL (ref 0–1.2)
BILIRUB UR QL STRIP: ABNORMAL
BNP SERPL-MCNC: <36 PG/ML (ref 0–125)
BUN SERPL-MCNC: 13 MG/DL (ref 6–20)
BUPRENORPHINE UR QL: NEGATIVE
CALCIUM SERPL-MCNC: 8.8 MG/DL (ref 8.6–10.2)
CANNABINOIDS UR QL SCN: POSITIVE
CASTS #/AREA URNS LPF: ABNORMAL /LPF
CHLORIDE SERPL-SCNC: 102 MMOL/L (ref 98–107)
CLARITY UR: CLEAR
CO2 SERPL-SCNC: 18 MMOL/L (ref 22–29)
COCAINE UR QL SCN: NEGATIVE
COLOR UR: YELLOW
CREAT SERPL-MCNC: 1.1 MG/DL (ref 0.7–1.2)
EOSINOPHIL # BLD: 0.15 K/UL (ref 0.05–0.5)
EOSINOPHILS RELATIVE PERCENT: 2 % (ref 0–6)
ERYTHROCYTE [DISTWIDTH] IN BLOOD BY AUTOMATED COUNT: 13.7 % (ref 11.5–15)
ETHANOLAMINE SERPL-MCNC: <10 MG/DL (ref 0–0.08)
FENTANYL UR QL: NEGATIVE
GFR, ESTIMATED: >90 ML/MIN/1.73M2
GLUCOSE SERPL-MCNC: 100 MG/DL (ref 74–99)
GLUCOSE UR STRIP-MCNC: NEGATIVE MG/DL
HCT VFR BLD AUTO: 42 % (ref 37–54)
HGB BLD-MCNC: 14.4 G/DL (ref 12.5–16.5)
HGB UR QL STRIP.AUTO: ABNORMAL
IMM GRANULOCYTES # BLD AUTO: 0.04 K/UL (ref 0–0.58)
IMM GRANULOCYTES NFR BLD: 0 % (ref 0–5)
INR PPP: 1.2
KETONES UR STRIP-MCNC: 15 MG/DL
LACTATE BLDV-SCNC: 1.5 MMOL/L (ref 0.5–2.2)
LACTATE BLDV-SCNC: 3.5 MMOL/L (ref 0.5–2.2)
LEUKOCYTE ESTERASE UR QL STRIP: ABNORMAL
LIPASE SERPL-CCNC: 147 U/L (ref 13–60)
LYMPHOCYTES NFR BLD: 1.86 K/UL (ref 1.5–4)
LYMPHOCYTES RELATIVE PERCENT: 18 % (ref 20–42)
MAGNESIUM SERPL-MCNC: 2.1 MG/DL (ref 1.6–2.6)
MCH RBC QN AUTO: 32.2 PG (ref 26–35)
MCHC RBC AUTO-ENTMCNC: 34.3 G/DL (ref 32–34.5)
MCV RBC AUTO: 94 FL (ref 80–99.9)
METHADONE UR QL: NEGATIVE
MONOCYTES NFR BLD: 1.5 K/UL (ref 0.1–0.95)
MONOCYTES NFR BLD: 15 % (ref 2–12)
NEUTROPHILS NFR BLD: 65 % (ref 43–80)
NEUTS SEG NFR BLD: 6.68 K/UL (ref 1.8–7.3)
NITRITE UR QL STRIP: NEGATIVE
OPIATES UR QL SCN: NEGATIVE
OXYCODONE UR QL SCN: NEGATIVE
PARTIAL THROMBOPLASTIN TIME: 33.7 SEC (ref 24.5–35.1)
PCP UR QL SCN: NEGATIVE
PH UR STRIP: 6 [PH] (ref 5–9)
PLATELET # BLD AUTO: 169 K/UL (ref 130–450)
PMV BLD AUTO: 11.6 FL (ref 7–12)
POTASSIUM SERPL-SCNC: 3.1 MMOL/L (ref 3.5–5)
PROT SERPL-MCNC: 6.8 G/DL (ref 6.4–8.3)
PROT UR STRIP-MCNC: ABNORMAL MG/DL
PROTHROMBIN TIME: 12.7 SEC (ref 9.3–12.4)
RBC # BLD AUTO: 4.47 M/UL (ref 3.8–5.8)
RBC #/AREA URNS HPF: ABNORMAL /HPF
SALICYLATES SERPL-MCNC: <0.3 MG/DL (ref 0–30)
SODIUM SERPL-SCNC: 137 MMOL/L (ref 132–146)
SP GR UR STRIP: >1.03 (ref 1–1.03)
TEST INFORMATION: ABNORMAL
TOXIC TRICYCLIC SC,BLOOD: NEGATIVE
TROPONIN I SERPL HS-MCNC: 7 NG/L (ref 0–11)
UROBILINOGEN UR STRIP-ACNC: 0.2 EU/DL (ref 0–1)
WBC #/AREA URNS HPF: ABNORMAL /HPF
WBC OTHER # BLD: 10.3 K/UL (ref 4.5–11.5)

## 2024-05-23 PROCEDURE — 84484 ASSAY OF TROPONIN QUANT: CPT

## 2024-05-23 PROCEDURE — 83690 ASSAY OF LIPASE: CPT

## 2024-05-23 PROCEDURE — 85610 PROTHROMBIN TIME: CPT

## 2024-05-23 PROCEDURE — 85025 COMPLETE CBC W/AUTO DIFF WBC: CPT

## 2024-05-23 PROCEDURE — 80143 DRUG ASSAY ACETAMINOPHEN: CPT

## 2024-05-23 PROCEDURE — 6360000004 HC RX CONTRAST MEDICATION: Performed by: RADIOLOGY

## 2024-05-23 PROCEDURE — 93005 ELECTROCARDIOGRAM TRACING: CPT

## 2024-05-23 PROCEDURE — G0480 DRUG TEST DEF 1-7 CLASSES: HCPCS

## 2024-05-23 PROCEDURE — 83735 ASSAY OF MAGNESIUM: CPT

## 2024-05-23 PROCEDURE — 82140 ASSAY OF AMMONIA: CPT

## 2024-05-23 PROCEDURE — 83880 ASSAY OF NATRIURETIC PEPTIDE: CPT

## 2024-05-23 PROCEDURE — 71045 X-RAY EXAM CHEST 1 VIEW: CPT

## 2024-05-23 PROCEDURE — 74177 CT ABD & PELVIS W/CONTRAST: CPT

## 2024-05-23 PROCEDURE — 81001 URINALYSIS AUTO W/SCOPE: CPT

## 2024-05-23 PROCEDURE — 80053 COMPREHEN METABOLIC PANEL: CPT

## 2024-05-23 PROCEDURE — 80179 DRUG ASSAY SALICYLATE: CPT

## 2024-05-23 PROCEDURE — 99285 EMERGENCY DEPT VISIT HI MDM: CPT

## 2024-05-23 PROCEDURE — 85730 THROMBOPLASTIN TIME PARTIAL: CPT

## 2024-05-23 PROCEDURE — 96361 HYDRATE IV INFUSION ADD-ON: CPT

## 2024-05-23 PROCEDURE — 2580000003 HC RX 258: Performed by: NURSE PRACTITIONER

## 2024-05-23 PROCEDURE — 71275 CT ANGIOGRAPHY CHEST: CPT

## 2024-05-23 PROCEDURE — 6360000002 HC RX W HCPCS: Performed by: EMERGENCY MEDICINE

## 2024-05-23 PROCEDURE — 80307 DRUG TEST PRSMV CHEM ANLYZR: CPT

## 2024-05-23 PROCEDURE — 83605 ASSAY OF LACTIC ACID: CPT

## 2024-05-23 PROCEDURE — 96375 TX/PRO/DX INJ NEW DRUG ADDON: CPT

## 2024-05-23 PROCEDURE — 96374 THER/PROPH/DIAG INJ IV PUSH: CPT

## 2024-05-23 PROCEDURE — 6370000000 HC RX 637 (ALT 250 FOR IP): Performed by: NURSE PRACTITIONER

## 2024-05-23 RX ORDER — LACTULOSE 10 G/15ML
30 SOLUTION ORAL ONCE
Status: COMPLETED | OUTPATIENT
Start: 2024-05-23 | End: 2024-05-23

## 2024-05-23 RX ORDER — 0.9 % SODIUM CHLORIDE 0.9 %
1000 INTRAVENOUS SOLUTION INTRAVENOUS ONCE
Status: COMPLETED | OUTPATIENT
Start: 2024-05-23 | End: 2024-05-23

## 2024-05-23 RX ORDER — MORPHINE SULFATE 2 MG/ML
2 INJECTION, SOLUTION INTRAMUSCULAR; INTRAVENOUS ONCE
Status: COMPLETED | OUTPATIENT
Start: 2024-05-23 | End: 2024-05-23

## 2024-05-23 RX ORDER — ONDANSETRON 2 MG/ML
4 INJECTION INTRAMUSCULAR; INTRAVENOUS EVERY 6 HOURS PRN
Status: DISCONTINUED | OUTPATIENT
Start: 2024-05-23 | End: 2024-05-24

## 2024-05-23 RX ADMIN — POTASSIUM BICARBONATE 40 MEQ: 782 TABLET, EFFERVESCENT ORAL at 21:20

## 2024-05-23 RX ADMIN — LACTULOSE 30 G: 20 SOLUTION ORAL at 21:20

## 2024-05-23 RX ADMIN — MORPHINE SULFATE 2 MG: 2 INJECTION, SOLUTION INTRAMUSCULAR; INTRAVENOUS at 21:20

## 2024-05-23 RX ADMIN — ONDANSETRON 4 MG: 2 INJECTION INTRAMUSCULAR; INTRAVENOUS at 21:21

## 2024-05-23 RX ADMIN — IOPAMIDOL 75 ML: 755 INJECTION, SOLUTION INTRAVENOUS at 21:44

## 2024-05-23 RX ADMIN — SODIUM CHLORIDE 1000 ML: 9 INJECTION, SOLUTION INTRAVENOUS at 20:05

## 2024-05-23 SDOH — ECONOMIC STABILITY - HOUSING INSECURITY: HOMELESSNESS UNSPECIFIED: Z59.00

## 2024-05-23 ASSESSMENT — PAIN SCALES - GENERAL
PAINLEVEL_OUTOF10: 7
PAINLEVEL_OUTOF10: 4

## 2024-05-23 ASSESSMENT — PAIN - FUNCTIONAL ASSESSMENT: PAIN_FUNCTIONAL_ASSESSMENT: 0-10

## 2024-05-23 ASSESSMENT — PAIN DESCRIPTION - LOCATION: LOCATION: ABDOMEN

## 2024-05-23 ASSESSMENT — LIFESTYLE VARIABLES
HOW MANY STANDARD DRINKS CONTAINING ALCOHOL DO YOU HAVE ON A TYPICAL DAY: PATIENT DOES NOT DRINK
HOW OFTEN DO YOU HAVE A DRINK CONTAINING ALCOHOL: NEVER

## 2024-05-23 NOTE — ED PROVIDER NOTES
depression noted.  Normal axis deviation.       Medical Decision Making:  Justo Chester is a 29 y.o. male presenting to the ED for drug rehab.  Patient presents to the emergency department states he was accepted at [facility] because he has liver cirrhosis.  Patient with a abdomen that is just slightly distended he did report that he was just drained yesterday at hospital in [place] for 4 L.  Patient reports that he did drink a tall boys today and prior to that he would drink a bottle of whiskey but the last time he really drink was 1 month ago.  Patient does admit to being homeless.  Patient was asked if he was able [place] go [place] rescue [place] he says he does not know this area.  Patient reports that he does not have any where to stay and he reports that when he was in [place] he was at [facility] and prior to that he has been at [facility] as well as [facility] and other drug rehab centers all recently.  And when he is not in a rehab center he is homeless.  I did make patient aware that we were going to do a medical workup on him and I will have the  see him but if he does not meet medical criteria we will look for rehab center to see if he met criteria if not we will look at the rescue mission patient then stated he is suicidal.  Patient states he has to say this order to get treatment.  Patient states that his plan will be to drink himself to death.  Patient states he does not have any family.  He denies homicidal ideations he denies any hallucinations.  He does report being short of breath.  Patient admits to drug use but when questioned further he states that it is the opiates that he is prescribed in the ER but no other opiates.  Patient does report past history of suicide attempt by alcohol as well as cutting.  Patient denies any chest pain.  Does report that his abdomen does still feel distended.  He denies any unusual lower extremity swelling as well as no fevers.  Differential includes mood

## 2024-05-23 NOTE — PROGRESS NOTES
Pt belongings ( 2 bags ) shirt shoes pants hoddie home meds wallet locked labeled and placed in locker 27

## 2024-05-24 VITALS
SYSTOLIC BLOOD PRESSURE: 155 MMHG | OXYGEN SATURATION: 95 % | TEMPERATURE: 97.7 F | RESPIRATION RATE: 16 BRPM | DIASTOLIC BLOOD PRESSURE: 86 MMHG | HEART RATE: 80 BPM

## 2024-05-24 LAB
EKG ATRIAL RATE: 118 BPM
EKG P AXIS: 6 DEGREES
EKG P-R INTERVAL: 148 MS
EKG Q-T INTERVAL: 354 MS
EKG QRS DURATION: 86 MS
EKG QTC CALCULATION (BAZETT): 496 MS
EKG R AXIS: 10 DEGREES
EKG T AXIS: 6 DEGREES
EKG VENTRICULAR RATE: 118 BPM

## 2024-05-24 PROCEDURE — 93010 ELECTROCARDIOGRAM REPORT: CPT | Performed by: INTERNAL MEDICINE

## 2024-05-24 RX ORDER — LORAZEPAM 2 MG/ML
1 INJECTION INTRAMUSCULAR ONCE
Status: DISCONTINUED | OUTPATIENT
Start: 2024-05-24 | End: 2024-05-24

## 2024-05-24 RX ORDER — ONDANSETRON 4 MG/1
4 TABLET, ORALLY DISINTEGRATING ORAL ONCE
Status: DISCONTINUED | OUTPATIENT
Start: 2024-05-24 | End: 2024-05-24 | Stop reason: HOSPADM

## 2024-05-24 NOTE — ED NOTES
Department of Emergency Medicine  FIRST PROVIDER TRIAGE NOTE             Independent MLP           5/23/24  5:54 PM EDT    Date of Encounter: 5/23/24   MRN: [id number]      HPI: Justo Chester is a 29 y.o. male who presents to the ED for No chief complaint on file.  Ascites, cirrhosis of the liver.  PTSD, manic depressive, anxiety.  Needs placement for detox-alcohol.  Patient did have alcoholic beverage today.  Patient states his abdomen is distended.  Patient states he has been denied at detox facilities due to his health issues.    ROS: Negative for cp or sob.    PE: Gen Appearance/Constitutional: alert  Musculoskeletal: moves all extremities x 4     Initial Plan of Care: All treatment areas with department are currently occupied. Plan to order/Initiate the following while awaiting opening in ED: EKG.  Initiate Treatment-Testing, Proceed toTreatment Area When Bed Available for ED Attending/MLP to Continue Care    Electronically signed by MERCEDES Groves NP   DD: 5/23/24       Luis Miguel Chester APRN - NP  05/23/24 1301    
Pt placed in psych gown, pants and socks, belongings into locker and garbage bags of clothes placed in soiled utility  
Pt stated \" I wish I did not [place] slip myself so I can go to my cousins\". Pt stated he wants us to transfer him to a Hospital in [facility] so he can be by cousin, I explained we do not transfer unless he needs a higher level of care  
Report called to [facility]  
SOPHIA received a call from [facility] ([facility]) with Physician's ETA of \"4-5 hours.\" SW asked if trip can be outsourced. [facility] to call back if earlier time is provided.  
SW contacted admitting (Charly), report given for referral out. No male beds at [facility], patient needing dual treatment.   
SW received a call from [facility] ([facility]) who said [facility] will accept, requesting involuntary hold slip and EKG. Patient to be accepted by Dr. Taylor to dual unit 110B. RN report number: [phone]. Patient is not to arrive until after 7am. [facility] to call back with ambulance ETA.  
Transfer/ambulance packet completed and ready for transfer.   
threatened anyone?  [x]  No  []  Yes (Ask the questions listed below)   When and what happened?    Have you ever threatened someone with a gun, knife or other weapon?   [x]  No  []  Yes - When and what happened?  2. Have you ever had an order of protection taken out against you? []  Yes [x]  No  3. Have you ever been arrested due to violence? []  Yes [x]  No  4. Have you ever been cruel to animals? []  Yes [x]  No    After consideration of C-SSRS screening results, C-SSRS assessments, and this professional's assessment the patient's overall suicide risk assessed to be:  [] No Risk  [] Low   [x] Moderate   [] High     [x] Discussed current suicide risk, protective and risk factors with RN and ED Physician.    Consulted with ED Physician. Disposition/level of care recommended at this time:  [] Home:   [] Outpatient Provider:   [] Crisis Unit:   [x] Inpatient Psychiatric Unit:  [] Other:     SOPHIA spoke to Dr. Darden who endorses hospitalization for safety concerns.

## 2024-05-27 ENCOUNTER — HOSPITAL ENCOUNTER (EMERGENCY)
Age: 29
Discharge: HOME | End: 2024-05-27
Attending: EMERGENCY MEDICINE
Payer: MEDICAID

## 2024-05-27 VITALS
HEART RATE: 89 BPM | OXYGEN SATURATION: 98 % | SYSTOLIC BLOOD PRESSURE: 130 MMHG | DIASTOLIC BLOOD PRESSURE: 80 MMHG | TEMPERATURE: 98 F | BODY MASS INDEX: 25.77 KG/M2 | RESPIRATION RATE: 11 BRPM | WEIGHT: 190 LBS

## 2024-05-27 DIAGNOSIS — R74.01 TRANSAMINITIS: ICD-10-CM

## 2024-05-27 DIAGNOSIS — R18.8 CIRRHOSIS OF LIVER WITH ASCITES, UNSPECIFIED HEPATIC CIRRHOSIS TYPE (HCC): Primary | ICD-10-CM

## 2024-05-27 DIAGNOSIS — D69.6 THROMBOCYTOPENIA (HCC): ICD-10-CM

## 2024-05-27 DIAGNOSIS — K74.60 CIRRHOSIS OF LIVER WITH ASCITES, UNSPECIFIED HEPATIC CIRRHOSIS TYPE (HCC): Primary | ICD-10-CM

## 2024-05-27 LAB
ALBUMIN SERPL-MCNC: 3.3 G/DL (ref 3.5–5.2)
ALP SERPL-CCNC: 149 U/L (ref 40–129)
ALT SERPL-CCNC: 75 U/L (ref 0–40)
AMMONIA PLAS-SCNC: 52 UMOL/L (ref 16–60)
ANION GAP SERPL CALCULATED.3IONS-SCNC: 14 MMOL/L (ref 7–16)
AST SERPL-CCNC: 83 U/L (ref 0–39)
BASOPHILS # BLD: 0.03 K/UL (ref 0–0.2)
BASOPHILS NFR BLD: 0 % (ref 0–2)
BILIRUB DIRECT SERPL-MCNC: 0.4 MG/DL (ref 0–0.3)
BILIRUB INDIRECT SERPL-MCNC: 0.6 MG/DL (ref 0–1)
BILIRUB SERPL-MCNC: 1 MG/DL (ref 0–1.2)
BILIRUB UR QL STRIP: NEGATIVE
BUN SERPL-MCNC: 20 MG/DL (ref 6–20)
CALCIUM SERPL-MCNC: 8.9 MG/DL (ref 8.6–10.2)
CHLORIDE SERPL-SCNC: 102 MMOL/L (ref 98–107)
CLARITY UR: CLEAR
CO2 SERPL-SCNC: 24 MMOL/L (ref 22–29)
COLOR UR: YELLOW
CREAT SERPL-MCNC: 1.4 MG/DL (ref 0.7–1.2)
EOSINOPHIL # BLD: 0.24 K/UL (ref 0.05–0.5)
EOSINOPHILS RELATIVE PERCENT: 3 % (ref 0–6)
ERYTHROCYTE [DISTWIDTH] IN BLOOD BY AUTOMATED COUNT: 13.9 % (ref 11.5–15)
GFR, ESTIMATED: 69 ML/MIN/1.73M2
GLUCOSE SERPL-MCNC: 96 MG/DL (ref 74–99)
GLUCOSE UR STRIP-MCNC: NEGATIVE MG/DL
HCT VFR BLD AUTO: 41.9 % (ref 37–54)
HGB BLD-MCNC: 14 G/DL (ref 12.5–16.5)
HGB UR QL STRIP.AUTO: ABNORMAL
IMM GRANULOCYTES # BLD AUTO: <0.03 K/UL (ref 0–0.58)
IMM GRANULOCYTES NFR BLD: 0 % (ref 0–5)
INR PPP: 1.2
KETONES UR STRIP-MCNC: ABNORMAL MG/DL
LACTATE BLDV-SCNC: 1.5 MMOL/L (ref 0.5–2.2)
LEUKOCYTE ESTERASE UR QL STRIP: ABNORMAL
LIPASE SERPL-CCNC: 93 U/L (ref 13–60)
LYMPHOCYTES NFR BLD: 1.07 K/UL (ref 1.5–4)
LYMPHOCYTES RELATIVE PERCENT: 14 % (ref 20–42)
MCH RBC QN AUTO: 32.3 PG (ref 26–35)
MCHC RBC AUTO-ENTMCNC: 33.4 G/DL (ref 32–34.5)
MCV RBC AUTO: 96.8 FL (ref 80–99.9)
MONOCYTES NFR BLD: 0.88 K/UL (ref 0.1–0.95)
MONOCYTES NFR BLD: 12 % (ref 2–12)
NEUTROPHILS NFR BLD: 70 % (ref 43–80)
NEUTS SEG NFR BLD: 5.31 K/UL (ref 1.8–7.3)
NITRITE UR QL STRIP: NEGATIVE
PH UR STRIP: 5.5 [PH] (ref 5–9)
PLATELET # BLD AUTO: 91 K/UL (ref 130–450)
PLATELET CONFIRMATION: NORMAL
PMV BLD AUTO: 12.7 FL (ref 7–12)
POTASSIUM SERPL-SCNC: 3.9 MMOL/L (ref 3.5–5)
PROT SERPL-MCNC: 6.6 G/DL (ref 6.4–8.3)
PROT UR STRIP-MCNC: NEGATIVE MG/DL
PROTHROMBIN TIME: 12.9 SEC (ref 9.3–12.4)
RBC # BLD AUTO: 4.33 M/UL (ref 3.8–5.8)
RBC #/AREA URNS HPF: ABNORMAL /HPF
SODIUM SERPL-SCNC: 140 MMOL/L (ref 132–146)
SP GR UR STRIP: 1.02 (ref 1–1.03)
UROBILINOGEN UR STRIP-ACNC: 0.2 EU/DL (ref 0–1)
WBC #/AREA URNS HPF: ABNORMAL /HPF
WBC OTHER # BLD: 7.5 K/UL (ref 4.5–11.5)

## 2024-05-27 PROCEDURE — 87070 CULTURE OTHR SPECIMN AEROBIC: CPT

## 2024-05-27 PROCEDURE — 85610 PROTHROMBIN TIME: CPT

## 2024-05-27 PROCEDURE — 87086 URINE CULTURE/COLONY COUNT: CPT

## 2024-05-27 PROCEDURE — 2580000003 HC RX 258: Performed by: STUDENT IN AN ORGANIZED HEALTH CARE EDUCATION/TRAINING PROGRAM

## 2024-05-27 PROCEDURE — 6370000000 HC RX 637 (ALT 250 FOR IP): Performed by: STUDENT IN AN ORGANIZED HEALTH CARE EDUCATION/TRAINING PROGRAM

## 2024-05-27 PROCEDURE — 96375 TX/PRO/DX INJ NEW DRUG ADDON: CPT

## 2024-05-27 PROCEDURE — 82248 BILIRUBIN DIRECT: CPT

## 2024-05-27 PROCEDURE — 82945 GLUCOSE OTHER FLUID: CPT

## 2024-05-27 PROCEDURE — 81001 URINALYSIS AUTO W/SCOPE: CPT

## 2024-05-27 PROCEDURE — 85025 COMPLETE CBC W/AUTO DIFF WBC: CPT

## 2024-05-27 PROCEDURE — 6360000002 HC RX W HCPCS: Performed by: STUDENT IN AN ORGANIZED HEALTH CARE EDUCATION/TRAINING PROGRAM

## 2024-05-27 PROCEDURE — 89051 BODY FLUID CELL COUNT: CPT

## 2024-05-27 PROCEDURE — 82140 ASSAY OF AMMONIA: CPT

## 2024-05-27 PROCEDURE — 49083 ABD PARACENTESIS W/IMAGING: CPT

## 2024-05-27 PROCEDURE — 83690 ASSAY OF LIPASE: CPT

## 2024-05-27 PROCEDURE — 83605 ASSAY OF LACTIC ACID: CPT

## 2024-05-27 PROCEDURE — 96374 THER/PROPH/DIAG INJ IV PUSH: CPT

## 2024-05-27 PROCEDURE — 84157 ASSAY OF PROTEIN OTHER: CPT

## 2024-05-27 PROCEDURE — 87205 SMEAR GRAM STAIN: CPT

## 2024-05-27 PROCEDURE — 93005 ELECTROCARDIOGRAM TRACING: CPT | Performed by: STUDENT IN AN ORGANIZED HEALTH CARE EDUCATION/TRAINING PROGRAM

## 2024-05-27 PROCEDURE — 82042 OTHER SOURCE ALBUMIN QUAN EA: CPT

## 2024-05-27 PROCEDURE — 83615 LACTATE (LD) (LDH) ENZYME: CPT

## 2024-05-27 PROCEDURE — 80053 COMPREHEN METABOLIC PANEL: CPT

## 2024-05-27 PROCEDURE — 99284 EMERGENCY DEPT VISIT MOD MDM: CPT

## 2024-05-27 RX ORDER — ONDANSETRON 2 MG/ML
4 INJECTION INTRAMUSCULAR; INTRAVENOUS ONCE
Status: COMPLETED | OUTPATIENT
Start: 2024-05-27 | End: 2024-05-27

## 2024-05-27 RX ORDER — OXYCODONE HYDROCHLORIDE AND ACETAMINOPHEN 5; 325 MG/1; MG/1
1 TABLET ORAL ONCE
Status: COMPLETED | OUTPATIENT
Start: 2024-05-27 | End: 2024-05-27

## 2024-05-27 RX ORDER — FENTANYL CITRATE 50 UG/ML
50 INJECTION, SOLUTION INTRAMUSCULAR; INTRAVENOUS ONCE
Status: COMPLETED | OUTPATIENT
Start: 2024-05-27 | End: 2024-05-27

## 2024-05-27 RX ORDER — 0.9 % SODIUM CHLORIDE 0.9 %
500 INTRAVENOUS SOLUTION INTRAVENOUS ONCE
Status: COMPLETED | OUTPATIENT
Start: 2024-05-27 | End: 2024-05-27

## 2024-05-27 RX ADMIN — OXYCODONE HYDROCHLORIDE AND ACETAMINOPHEN 1 TABLET: 5; 325 TABLET ORAL at 21:22

## 2024-05-27 RX ADMIN — ONDANSETRON 4 MG: 2 INJECTION INTRAMUSCULAR; INTRAVENOUS at 15:20

## 2024-05-27 RX ADMIN — SODIUM CHLORIDE 500 ML: 9 INJECTION, SOLUTION INTRAVENOUS at 20:53

## 2024-05-27 RX ADMIN — FENTANYL CITRATE 50 MCG: 50 INJECTION INTRAMUSCULAR; INTRAVENOUS at 15:20

## 2024-05-27 ASSESSMENT — PAIN DESCRIPTION - ORIENTATION: ORIENTATION: RIGHT;LEFT

## 2024-05-27 ASSESSMENT — LIFESTYLE VARIABLES
HOW OFTEN DO YOU HAVE A DRINK CONTAINING ALCOHOL: NEVER
HOW MANY STANDARD DRINKS CONTAINING ALCOHOL DO YOU HAVE ON A TYPICAL DAY: PATIENT DOES NOT DRINK

## 2024-05-27 ASSESSMENT — PAIN DESCRIPTION - DESCRIPTORS: DESCRIPTORS: SHARP;SPASM

## 2024-05-27 ASSESSMENT — PAIN DESCRIPTION - LOCATION: LOCATION: ABDOMEN

## 2024-05-27 ASSESSMENT — PAIN - FUNCTIONAL ASSESSMENT: PAIN_FUNCTIONAL_ASSESSMENT: NONE - DENIES PAIN

## 2024-05-27 NOTE — ED PROVIDER NOTES
ED Course User Index  [PW] Ino Brown, DO  [SS] Kwame Mathew MD          Medications given include:  Medications   oxyCODONE-acetaminophen (PERCOCET) 5-325 MG per tablet 1 tablet (has no administration in time range)   sodium chloride 0.9 % bolus 500 mL (has no administration in time range)   ondansetron (ZOFRAN) injection 4 mg (4 mg IntraVENous Given 5/27/24 1520)   fentaNYL (SUBLIMAZE) injection 50 mcg (50 mcg IntraVENous Given 5/27/24 1520)       See ED course above for MDM unless otherwise stated below.       Fentanyl, Zofran given    Lab work remarkable for mild BRITTANI with creatinine 1.4, BUN of 20.  Hepatic function panel similar to baseline with AST 83, ALT 75, alk phos 149, mild hypoalbuminemia 3.3.  No leukocytosis present.  No anemia.  Platelets 91.  Lactate within normal limits.  INR 1.2.  Ammonia 52.  UA with trace leuks, WBCs, felt less likely to be infection however, patient denied any urinary symptoms.  Urine cultures were sent.  EKG was reassuring.     Patient is nonseptic, afebrile, with expected abdominal discomfort from his distention, abdomen nonacute on exam.  SBP felt unlikely.     Discussed option of bedside therapeutic paracentesis in the ED which patient was agreeable to.  Patient was consented and abdominal paracentesis was performed and producing 4050 mL of clear straw-colored fluid.  Procedure was well-tolerated.  Vitals remained stable.  Fluid was sent to lab for analysis.     Ascitic cell count with 274 WBCs, 9% neutrophils.  No evidence of SBP.     Percocet was given, patient is feeling improved post procedure, tolerated meal.     Discussed results, recommended follow-up with primary care doctor and return precautions were given.  Patient is feeling improved after treatment today. Pt was discharged back to [facility] facility.       -------------------- Consults --------------------  (Unless stated prior)  IP CONSULT TO INTERNAL MEDICINE    -------------------- RESULTS

## 2024-05-28 LAB
ALBUMIN FLD-MCNC: 0.8 G/DL
APPEARANCE FLD: NORMAL
BODY FLD TYPE: NORMAL
CLOT CHECK: NORMAL
COLOR FLD: NORMAL
EKG ATRIAL RATE: 95 BPM
EKG P AXIS: 33 DEGREES
EKG P-R INTERVAL: 136 MS
EKG Q-T INTERVAL: 384 MS
EKG QRS DURATION: 78 MS
EKG QTC CALCULATION (BAZETT): 482 MS
EKG R AXIS: 8 DEGREES
EKG T AXIS: 15 DEGREES
EKG VENTRICULAR RATE: 95 BPM
GLUCOSE FLD-MCNC: 112 MG/DL
LDH FLD L TO P-CCNC: 42 U/L
MONOCYTES NFR FLD: 91 %
NEUTROPHILS NFR FLD: 9 %
PROT FLD-MCNC: 1 G/DL
RBC # FLD: <2000 CELLS/UL
SPECIMEN TYPE: NORMAL
WBC # FLD: 274 CELLS/UL

## 2024-05-28 PROCEDURE — 93010 ELECTROCARDIOGRAM REPORT: CPT | Performed by: INTERNAL MEDICINE

## 2024-05-28 NOTE — DISCHARGE INSTRUCTIONS
Please follow-up with your primary care doctor and return to the hospital for any new or worsening symptoms.    Return to ED for any fever, worsening abdominal pain or persistent nausea vomiting

## 2024-05-29 ENCOUNTER — APPOINTMENT (OUTPATIENT)
Dept: CT IMAGING | Age: 29
End: 2024-05-29
Attending: STUDENT IN AN ORGANIZED HEALTH CARE EDUCATION/TRAINING PROGRAM
Payer: MEDICAID

## 2024-05-29 ENCOUNTER — HOSPITAL ENCOUNTER (OUTPATIENT)
Age: 29
Setting detail: OBSERVATION
Discharge: OTHER INSTITUTION | End: 2024-05-31
Attending: STUDENT IN AN ORGANIZED HEALTH CARE EDUCATION/TRAINING PROGRAM | Admitting: FAMILY MEDICINE
Payer: MEDICAID

## 2024-05-29 ENCOUNTER — APPOINTMENT (OUTPATIENT)
Dept: GENERAL RADIOLOGY | Age: 29
End: 2024-05-29
Payer: MEDICAID

## 2024-05-29 DIAGNOSIS — K70.31 ASCITES DUE TO ALCOHOLIC CIRRHOSIS (HCC): Primary | ICD-10-CM

## 2024-05-29 DIAGNOSIS — N18.9 CHRONIC KIDNEY DISEASE, UNSPECIFIED CKD STAGE: ICD-10-CM

## 2024-05-29 DIAGNOSIS — E87.70 HYPERVOLEMIA, UNSPECIFIED HYPERVOLEMIA TYPE: ICD-10-CM

## 2024-05-29 PROBLEM — R10.9 ABDOMINAL PAIN: Status: ACTIVE | Noted: 2024-05-29

## 2024-05-29 LAB
ALBUMIN SERPL-MCNC: 3.4 G/DL (ref 3.5–5.2)
ALP SERPL-CCNC: 161 U/L (ref 40–129)
ALT SERPL-CCNC: 69 U/L (ref 0–40)
ANION GAP SERPL CALCULATED.3IONS-SCNC: 11 MMOL/L (ref 7–16)
AST SERPL-CCNC: 79 U/L (ref 0–39)
BACTERIA URNS QL MICRO: ABNORMAL
BASOPHILS # BLD: 0.04 K/UL (ref 0–0.2)
BASOPHILS NFR BLD: 1 % (ref 0–2)
BILIRUB DIRECT SERPL-MCNC: 0.4 MG/DL (ref 0–0.3)
BILIRUB INDIRECT SERPL-MCNC: 0.6 MG/DL (ref 0–1)
BILIRUB SERPL-MCNC: 1 MG/DL (ref 0–1.2)
BILIRUB UR QL STRIP: NEGATIVE
BUN SERPL-MCNC: 21 MG/DL (ref 6–20)
CALCIUM SERPL-MCNC: 8.8 MG/DL (ref 8.6–10.2)
CASTS #/AREA URNS LPF: ABNORMAL /LPF
CASTS #/AREA URNS LPF: ABNORMAL /LPF
CHLORIDE SERPL-SCNC: 102 MMOL/L (ref 98–107)
CLARITY UR: CLEAR
CO2 SERPL-SCNC: 26 MMOL/L (ref 22–29)
COLOR UR: YELLOW
CREAT SERPL-MCNC: 1.3 MG/DL (ref 0.7–1.2)
CRYSTALS URNS MICRO: ABNORMAL /HPF
EOSINOPHIL # BLD: 0.27 K/UL (ref 0.05–0.5)
EOSINOPHILS RELATIVE PERCENT: 4 % (ref 0–6)
ERYTHROCYTE [DISTWIDTH] IN BLOOD BY AUTOMATED COUNT: 14 % (ref 11.5–15)
GFR, ESTIMATED: 77 ML/MIN/1.73M2
GLUCOSE SERPL-MCNC: 94 MG/DL (ref 74–99)
GLUCOSE UR STRIP-MCNC: NEGATIVE MG/DL
HCT VFR BLD AUTO: 40.5 % (ref 37–54)
HGB BLD-MCNC: 13.9 G/DL (ref 12.5–16.5)
HGB UR QL STRIP.AUTO: ABNORMAL
IMM GRANULOCYTES # BLD AUTO: 0.03 K/UL (ref 0–0.58)
IMM GRANULOCYTES NFR BLD: 1 % (ref 0–5)
INR PPP: 1.2
KETONES UR STRIP-MCNC: ABNORMAL MG/DL
LACTATE BLDV-SCNC: 1.4 MMOL/L (ref 0.5–2.2)
LEUKOCYTE ESTERASE UR QL STRIP: ABNORMAL
LIPASE SERPL-CCNC: 66 U/L (ref 13–60)
LYMPHOCYTES NFR BLD: 0.94 K/UL (ref 1.5–4)
LYMPHOCYTES RELATIVE PERCENT: 15 % (ref 20–42)
MCH RBC QN AUTO: 33 PG (ref 26–35)
MCHC RBC AUTO-ENTMCNC: 34.3 G/DL (ref 32–34.5)
MCV RBC AUTO: 96.2 FL (ref 80–99.9)
MICROORGANISM SPEC CULT: ABNORMAL
MICROORGANISM SPEC CULT: NO GROWTH
MICROORGANISM/AGENT SPEC: NORMAL
MONOCYTES NFR BLD: 0.76 K/UL (ref 0.1–0.95)
MONOCYTES NFR BLD: 12 % (ref 2–12)
NEUTROPHILS NFR BLD: 68 % (ref 43–80)
NEUTS SEG NFR BLD: 4.42 K/UL (ref 1.8–7.3)
NITRITE UR QL STRIP: NEGATIVE
PARTIAL THROMBOPLASTIN TIME: 35.6 SEC (ref 24.5–35.1)
PH UR STRIP: 6 [PH] (ref 5–9)
PLATELET, FLUORESCENCE: 133 K/UL (ref 130–450)
PMV BLD AUTO: 12 FL (ref 7–12)
POTASSIUM SERPL-SCNC: 4.1 MMOL/L (ref 3.5–5)
PROT SERPL-MCNC: 6.7 G/DL (ref 6.4–8.3)
PROT UR STRIP-MCNC: NEGATIVE MG/DL
PROTHROMBIN TIME: 13.2 SEC (ref 9.3–12.4)
RBC # BLD AUTO: 4.21 M/UL (ref 3.8–5.8)
RBC #/AREA URNS HPF: ABNORMAL /HPF
RENAL EPITHELIAL, UA: PRESENT /HPF
SODIUM SERPL-SCNC: 139 MMOL/L (ref 132–146)
SP GR UR STRIP: >1.03 (ref 1–1.03)
SPECIMEN DESCRIPTION: ABNORMAL
SPECIMEN DESCRIPTION: NORMAL
TROPONIN I SERPL HS-MCNC: 9 NG/L (ref 0–11)
UROBILINOGEN UR STRIP-ACNC: 0.2 EU/DL (ref 0–1)
WBC #/AREA URNS HPF: ABNORMAL /HPF
WBC OTHER # BLD: 6.5 K/UL (ref 4.5–11.5)

## 2024-05-29 PROCEDURE — G0378 HOSPITAL OBSERVATION PER HR: HCPCS

## 2024-05-29 PROCEDURE — 93005 ELECTROCARDIOGRAM TRACING: CPT | Performed by: STUDENT IN AN ORGANIZED HEALTH CARE EDUCATION/TRAINING PROGRAM

## 2024-05-29 PROCEDURE — 82248 BILIRUBIN DIRECT: CPT

## 2024-05-29 PROCEDURE — 81001 URINALYSIS AUTO W/SCOPE: CPT

## 2024-05-29 PROCEDURE — 6360000002 HC RX W HCPCS: Performed by: STUDENT IN AN ORGANIZED HEALTH CARE EDUCATION/TRAINING PROGRAM

## 2024-05-29 PROCEDURE — 96375 TX/PRO/DX INJ NEW DRUG ADDON: CPT

## 2024-05-29 PROCEDURE — 2580000003 HC RX 258: Performed by: FAMILY MEDICINE

## 2024-05-29 PROCEDURE — 85730 THROMBOPLASTIN TIME PARTIAL: CPT

## 2024-05-29 PROCEDURE — 84484 ASSAY OF TROPONIN QUANT: CPT

## 2024-05-29 PROCEDURE — 99222 1ST HOSP IP/OBS MODERATE 55: CPT | Performed by: FAMILY MEDICINE

## 2024-05-29 PROCEDURE — 96376 TX/PRO/DX INJ SAME DRUG ADON: CPT

## 2024-05-29 PROCEDURE — 71045 X-RAY EXAM CHEST 1 VIEW: CPT

## 2024-05-29 PROCEDURE — 83605 ASSAY OF LACTIC ACID: CPT

## 2024-05-29 PROCEDURE — 80053 COMPREHEN METABOLIC PANEL: CPT

## 2024-05-29 PROCEDURE — 74177 CT ABD & PELVIS W/CONTRAST: CPT

## 2024-05-29 PROCEDURE — 6360000004 HC RX CONTRAST MEDICATION: Performed by: RADIOLOGY

## 2024-05-29 PROCEDURE — 99285 EMERGENCY DEPT VISIT HI MDM: CPT

## 2024-05-29 PROCEDURE — 85025 COMPLETE CBC W/AUTO DIFF WBC: CPT

## 2024-05-29 PROCEDURE — 83690 ASSAY OF LIPASE: CPT

## 2024-05-29 PROCEDURE — 96374 THER/PROPH/DIAG INJ IV PUSH: CPT

## 2024-05-29 PROCEDURE — 85610 PROTHROMBIN TIME: CPT

## 2024-05-29 RX ORDER — FENTANYL CITRATE 50 UG/ML
25 INJECTION, SOLUTION INTRAMUSCULAR; INTRAVENOUS ONCE
Status: COMPLETED | OUTPATIENT
Start: 2024-05-29 | End: 2024-05-29

## 2024-05-29 RX ORDER — IBUPROFEN 400 MG/1
400 TABLET ORAL EVERY 6 HOURS PRN
Status: DISCONTINUED | OUTPATIENT
Start: 2024-05-29 | End: 2024-05-31 | Stop reason: HOSPADM

## 2024-05-29 RX ORDER — FENTANYL CITRATE 50 UG/ML
50 INJECTION, SOLUTION INTRAMUSCULAR; INTRAVENOUS ONCE
Status: COMPLETED | OUTPATIENT
Start: 2024-05-29 | End: 2024-05-29

## 2024-05-29 RX ORDER — ONDANSETRON 2 MG/ML
4 INJECTION INTRAMUSCULAR; INTRAVENOUS ONCE
Status: COMPLETED | OUTPATIENT
Start: 2024-05-29 | End: 2024-05-29

## 2024-05-29 RX ORDER — SODIUM CHLORIDE 0.9 % (FLUSH) 0.9 %
5-40 SYRINGE (ML) INJECTION EVERY 12 HOURS SCHEDULED
Status: DISCONTINUED | OUTPATIENT
Start: 2024-05-29 | End: 2024-05-31 | Stop reason: HOSPADM

## 2024-05-29 RX ORDER — SODIUM CHLORIDE 9 MG/ML
INJECTION, SOLUTION INTRAVENOUS PRN
Status: DISCONTINUED | OUTPATIENT
Start: 2024-05-29 | End: 2024-05-31 | Stop reason: HOSPADM

## 2024-05-29 RX ORDER — SODIUM CHLORIDE 0.9 % (FLUSH) 0.9 %
5-40 SYRINGE (ML) INJECTION PRN
Status: DISCONTINUED | OUTPATIENT
Start: 2024-05-29 | End: 2024-05-31 | Stop reason: HOSPADM

## 2024-05-29 RX ADMIN — IOPAMIDOL 75 ML: 755 INJECTION, SOLUTION INTRAVENOUS at 19:12

## 2024-05-29 RX ADMIN — FENTANYL CITRATE 50 MCG: 50 INJECTION INTRAMUSCULAR; INTRAVENOUS at 20:49

## 2024-05-29 RX ADMIN — ONDANSETRON 4 MG: 2 INJECTION INTRAMUSCULAR; INTRAVENOUS at 20:48

## 2024-05-29 RX ADMIN — ONDANSETRON 4 MG: 2 INJECTION INTRAMUSCULAR; INTRAVENOUS at 17:44

## 2024-05-29 RX ADMIN — SODIUM CHLORIDE, PRESERVATIVE FREE 10 ML: 5 INJECTION INTRAVENOUS at 22:57

## 2024-05-29 RX ADMIN — FENTANYL CITRATE 25 MCG: 50 INJECTION INTRAMUSCULAR; INTRAVENOUS at 17:45

## 2024-05-29 ASSESSMENT — PAIN DESCRIPTION - DESCRIPTORS: DESCRIPTORS: ACHING;SHARP;DISCOMFORT

## 2024-05-29 ASSESSMENT — PAIN DESCRIPTION - ORIENTATION
ORIENTATION: LEFT
ORIENTATION: RIGHT;LEFT

## 2024-05-29 ASSESSMENT — PAIN SCALES - GENERAL
PAINLEVEL_OUTOF10: 7
PAINLEVEL_OUTOF10: 7
PAINLEVEL_OUTOF10: 8

## 2024-05-29 ASSESSMENT — PAIN - FUNCTIONAL ASSESSMENT: PAIN_FUNCTIONAL_ASSESSMENT: 0-10

## 2024-05-29 ASSESSMENT — PAIN DESCRIPTION - LOCATION
LOCATION: ABDOMEN
LOCATION: ABDOMEN
LOCATION: ABDOMEN;BACK

## 2024-05-29 ASSESSMENT — PAIN DESCRIPTION - PAIN TYPE: TYPE: CHRONIC PAIN;ACUTE PAIN

## 2024-05-29 NOTE — ED PROVIDER NOTES
[facility] EMERGENCY DEPARTMENT  EMERGENCY DEPARTMENT ENCOUNTER        Pt Name: Justo Chester  MRN: [id number]  Birthdate 1995  Date of evaluation: 5/29/2024  Provider: Edyta Gimenez DO  PCP: No primary care provider on file.  Note Started: 4:54 PM EDT 5/29/24    CHIEF COMPLAINT       Chief Complaint   Patient presents with    Abdominal Pain     Patient sent in from [facility] for distended abdomen. Patient recently transferred to [facility] after paracentesis.       HISTORY OF PRESENT ILLNESS: 1 or more Elements   History From: patient    Limitations to history : None    Justo Chester is a 29 y.o. male with history of alcohol cirrhosis, bipolar, anxiety, depression, decompensated liver disease presenting to the emergency department via EMS from [facility] for distended abdomen.  Patient was recently transferred back to [facility] after he had paracentesis performed in the emergency department.  Patient had a paracentesis performed at [facility] on 5-.  He was admitted for suicidal ideations 5- and then transferred to [facility] on the 24th.  He came back-year-old 3- due to ascites and had a bedside paracentesis performed by the emergency department.  He states that he thinks that they got 4 L off, but he is having increased pain today which brought him back into the department.  Patient states that he feels distended and like he needs more fluid taken off and then immediately fell back up.  He has complained of some shortness of breath intermittently due to the increased ascites.  Patient denies any fevers, chills, lightheadedness, dizziness, syncope, chest pain,  nausea, vomiting, diarrhea, numbness, tingling, lateral weakness, recent illness, or other acute symptoms or concerns.  Patient states that he was admitted for acute cholecystitis in April and ever since then he feels like the ascites has been worsening.    Nursing Notes  no large pleural effusion present.    Patient would benefit from another paracentesis.  Discussed with hospitalist who accepted for admission.  Patient agreeable to plan and admission.    While in the emergency department the patient was treated with IV fentanyl in addition to IV Zofran.      CONSULTS: (Who and What was discussed)  IP CONSULT TO HOSPITALIST  IP CONSULT TO GI    Spoke with Dr. Delgado (Medicine).  Discussed case.  They will admit this patient.        I am the Primary Clinician of Record.    FINAL IMPRESSION      1. Ascites due to alcoholic cirrhosis (HCC)    2. Hypervolemia, unspecified hypervolemia type    3. Chronic kidney disease, unspecified CKD stage          DISPOSITION/PLAN     DISPOSITION Admitted 05/29/2024 10:21:10 PM      PATIENT REFERRED TO:  No follow-up provider specified.    DISCHARGE MEDICATIONS:  New Prescriptions    No medications on file       DISCONTINUED MEDICATIONS:  Discontinued Medications    No medications on file              (Please note that portions of this note were completed with a voice recognition program.  Efforts were made to edit the dictations but occasionally words are mis-transcribed.)    Edyta Gimenez DO (electronically signed)           Edyta Gimenez DO  05/30/24 0129

## 2024-05-30 ENCOUNTER — APPOINTMENT (OUTPATIENT)
Dept: INTERVENTIONAL RADIOLOGY/VASCULAR | Age: 29
End: 2024-05-30
Payer: MEDICAID

## 2024-05-30 PROBLEM — Z59.00 HOMELESS: Status: ACTIVE | Noted: 2023-09-13

## 2024-05-30 PROBLEM — F41.1 GENERALIZED ANXIETY DISORDER: Status: ACTIVE | Noted: 2021-06-25

## 2024-05-30 PROBLEM — K70.31 ASCITES DUE TO ALCOHOLIC CIRRHOSIS (HCC): Status: ACTIVE | Noted: 2024-05-30

## 2024-05-30 PROBLEM — R10.84 GENERALIZED ABDOMINAL PAIN: Status: ACTIVE | Noted: 2023-08-27

## 2024-05-30 PROBLEM — F31.81 BIPOLAR II DISORDER, MOST RECENT EPISODE MAJOR DEPRESSIVE (HCC): Status: ACTIVE | Noted: 2021-06-25

## 2024-05-30 PROBLEM — F17.200 NICOTINE USE DISORDER: Status: ACTIVE | Noted: 2023-08-23

## 2024-05-30 PROBLEM — K74.69 DECOMPENSATED LIVER DISEASE (HCC): Status: ACTIVE | Noted: 2024-05-16

## 2024-05-30 LAB
ALBUMIN FLD-MCNC: 0.8 G/DL
AMYLASE FLD-CCNC: 39 U/L
ANION GAP SERPL CALCULATED.3IONS-SCNC: 14 MMOL/L (ref 7–16)
APPEARANCE FLD: NORMAL
BASOPHILS # BLD: 0.05 K/UL (ref 0–0.2)
BASOPHILS NFR BLD: 1 % (ref 0–2)
BODY FLD TYPE: NORMAL
BUN SERPL-MCNC: 21 MG/DL (ref 6–20)
CALCIUM SERPL-MCNC: 8.5 MG/DL (ref 8.6–10.2)
CHLORIDE SERPL-SCNC: 104 MMOL/L (ref 98–107)
CLOT CHECK: NORMAL
CO2 SERPL-SCNC: 20 MMOL/L (ref 22–29)
COLOR FLD: NORMAL
CREAT SERPL-MCNC: 1.3 MG/DL (ref 0.7–1.2)
EOSINOPHIL # BLD: 0.3 K/UL (ref 0.05–0.5)
EOSINOPHILS RELATIVE PERCENT: 4 % (ref 0–6)
ERYTHROCYTE [DISTWIDTH] IN BLOOD BY AUTOMATED COUNT: 14 % (ref 11.5–15)
GFR, ESTIMATED: 74 ML/MIN/1.73M2
GLUCOSE SERPL-MCNC: 78 MG/DL (ref 74–99)
HCT VFR BLD AUTO: 37.1 % (ref 37–54)
HGB BLD-MCNC: 12.4 G/DL (ref 12.5–16.5)
IMM GRANULOCYTES # BLD AUTO: <0.03 K/UL (ref 0–0.58)
IMM GRANULOCYTES NFR BLD: 0 % (ref 0–5)
INR PPP: 1.3
LYMPHOCYTES NFR BLD: 1.28 K/UL (ref 1.5–4)
LYMPHOCYTES RELATIVE PERCENT: 18 % (ref 20–42)
MCH RBC QN AUTO: 32.4 PG (ref 26–35)
MCHC RBC AUTO-ENTMCNC: 33.4 G/DL (ref 32–34.5)
MCV RBC AUTO: 96.9 FL (ref 80–99.9)
MONOCYTES NFR BLD: 1.15 K/UL (ref 0.1–0.95)
MONOCYTES NFR BLD: 16 % (ref 2–12)
MONOCYTES NFR FLD: 93 %
NEUTROPHILS NFR BLD: 61 % (ref 43–80)
NEUTROPHILS NFR FLD: 7 %
NEUTS SEG NFR BLD: 4.32 K/UL (ref 1.8–7.3)
PLATELET # BLD AUTO: 119 K/UL (ref 130–450)
PMV BLD AUTO: 12.2 FL (ref 7–12)
POTASSIUM SERPL-SCNC: 4 MMOL/L (ref 3.5–5)
PROTHROMBIN TIME: 14 SEC (ref 9.3–12.4)
RBC # BLD AUTO: 3.83 M/UL (ref 3.8–5.8)
RBC # FLD: <2000 CELLS/UL
SODIUM SERPL-SCNC: 138 MMOL/L (ref 132–146)
SPECIMEN TYPE: NORMAL
SPECIMEN TYPE: NORMAL
WBC # FLD: 293 CELLS/UL
WBC OTHER # BLD: 7.1 K/UL (ref 4.5–11.5)

## 2024-05-30 PROCEDURE — 99254 IP/OBS CNSLTJ NEW/EST MOD 60: CPT | Performed by: NURSE PRACTITIONER

## 2024-05-30 PROCEDURE — 6370000000 HC RX 637 (ALT 250 FOR IP): Performed by: FAMILY MEDICINE

## 2024-05-30 PROCEDURE — 85610 PROTHROMBIN TIME: CPT

## 2024-05-30 PROCEDURE — 49083 ABD PARACENTESIS W/IMAGING: CPT

## 2024-05-30 PROCEDURE — 89051 BODY FLUID CELL COUNT: CPT

## 2024-05-30 PROCEDURE — G0378 HOSPITAL OBSERVATION PER HR: HCPCS

## 2024-05-30 PROCEDURE — 96376 TX/PRO/DX INJ SAME DRUG ADON: CPT

## 2024-05-30 PROCEDURE — 82042 OTHER SOURCE ALBUMIN QUAN EA: CPT

## 2024-05-30 PROCEDURE — 85025 COMPLETE CBC W/AUTO DIFF WBC: CPT

## 2024-05-30 PROCEDURE — 6370000000 HC RX 637 (ALT 250 FOR IP): Performed by: STUDENT IN AN ORGANIZED HEALTH CARE EDUCATION/TRAINING PROGRAM

## 2024-05-30 PROCEDURE — 2580000003 HC RX 258: Performed by: FAMILY MEDICINE

## 2024-05-30 PROCEDURE — C1729 CATH, DRAINAGE: HCPCS

## 2024-05-30 PROCEDURE — 82150 ASSAY OF AMYLASE: CPT

## 2024-05-30 PROCEDURE — 2500000003 HC RX 250 WO HCPCS: Performed by: PHYSICIAN ASSISTANT

## 2024-05-30 PROCEDURE — 36415 COLL VENOUS BLD VENIPUNCTURE: CPT

## 2024-05-30 PROCEDURE — 99232 SBSQ HOSP IP/OBS MODERATE 35: CPT | Performed by: STUDENT IN AN ORGANIZED HEALTH CARE EDUCATION/TRAINING PROGRAM

## 2024-05-30 PROCEDURE — 6360000002 HC RX W HCPCS: Performed by: STUDENT IN AN ORGANIZED HEALTH CARE EDUCATION/TRAINING PROGRAM

## 2024-05-30 PROCEDURE — 80048 BASIC METABOLIC PNL TOTAL CA: CPT

## 2024-05-30 RX ORDER — FOLIC ACID 1 MG/1
1 TABLET ORAL DAILY
Status: DISCONTINUED | OUTPATIENT
Start: 2024-05-30 | End: 2024-05-31 | Stop reason: HOSPADM

## 2024-05-30 RX ORDER — OXYCODONE HYDROCHLORIDE 5 MG/1
5 TABLET ORAL EVERY 4 HOURS PRN
Status: DISCONTINUED | OUTPATIENT
Start: 2024-05-30 | End: 2024-05-31 | Stop reason: HOSPADM

## 2024-05-30 RX ORDER — LIDOCAINE HYDROCHLORIDE 20 MG/ML
INJECTION, SOLUTION INFILTRATION; PERINEURAL PRN
Status: COMPLETED | OUTPATIENT
Start: 2024-05-30 | End: 2024-05-30

## 2024-05-30 RX ORDER — MECOBALAMIN 5000 MCG
10 TABLET,DISINTEGRATING ORAL NIGHTLY PRN
Status: DISCONTINUED | OUTPATIENT
Start: 2024-05-30 | End: 2024-05-31 | Stop reason: HOSPADM

## 2024-05-30 RX ORDER — ONDANSETRON 2 MG/ML
4 INJECTION INTRAMUSCULAR; INTRAVENOUS EVERY 6 HOURS PRN
Status: DISCONTINUED | OUTPATIENT
Start: 2024-05-30 | End: 2024-05-31 | Stop reason: HOSPADM

## 2024-05-30 RX ORDER — HYDROXYZINE PAMOATE 25 MG/1
25 CAPSULE ORAL EVERY 6 HOURS PRN
Status: DISCONTINUED | OUTPATIENT
Start: 2024-05-30 | End: 2024-05-31 | Stop reason: HOSPADM

## 2024-05-30 RX ORDER — LANOLIN ALCOHOL/MO/W.PET/CERES
500 CREAM (GRAM) TOPICAL DAILY
Status: DISCONTINUED | OUTPATIENT
Start: 2024-05-30 | End: 2024-05-31 | Stop reason: HOSPADM

## 2024-05-30 RX ORDER — SODIUM CHLORIDE 0.9 % (FLUSH) 0.9 %
5-40 SYRINGE (ML) INJECTION EVERY 12 HOURS SCHEDULED
Status: DISCONTINUED | OUTPATIENT
Start: 2024-05-30 | End: 2024-05-31 | Stop reason: HOSPADM

## 2024-05-30 RX ORDER — SODIUM CHLORIDE 0.9 % (FLUSH) 0.9 %
5-40 SYRINGE (ML) INJECTION PRN
Status: DISCONTINUED | OUTPATIENT
Start: 2024-05-30 | End: 2024-05-31 | Stop reason: HOSPADM

## 2024-05-30 RX ORDER — QUETIAPINE FUMARATE 100 MG/1
100 TABLET, FILM COATED ORAL NIGHTLY
Status: DISCONTINUED | OUTPATIENT
Start: 2024-05-30 | End: 2024-05-31 | Stop reason: HOSPADM

## 2024-05-30 RX ORDER — SODIUM CHLORIDE 9 MG/ML
INJECTION, SOLUTION INTRAVENOUS PRN
Status: DISCONTINUED | OUTPATIENT
Start: 2024-05-30 | End: 2024-05-31 | Stop reason: HOSPADM

## 2024-05-30 RX ORDER — LANOLIN ALCOHOL/MO/W.PET/CERES
300 CREAM (GRAM) TOPICAL DAILY
Status: DISCONTINUED | OUTPATIENT
Start: 2024-05-30 | End: 2024-05-31 | Stop reason: HOSPADM

## 2024-05-30 RX ORDER — M-VIT,TX,IRON,MINS/CALC/FOLIC 27MG-0.4MG
1 TABLET ORAL DAILY
Status: DISCONTINUED | OUTPATIENT
Start: 2024-05-30 | End: 2024-05-31 | Stop reason: HOSPADM

## 2024-05-30 RX ORDER — SPIRONOLACTONE 25 MG/1
100 TABLET ORAL DAILY
Status: DISCONTINUED | OUTPATIENT
Start: 2024-05-30 | End: 2024-05-31 | Stop reason: HOSPADM

## 2024-05-30 RX ORDER — PANTOPRAZOLE SODIUM 40 MG/1
40 TABLET, DELAYED RELEASE ORAL
Status: DISCONTINUED | OUTPATIENT
Start: 2024-05-31 | End: 2024-05-31 | Stop reason: HOSPADM

## 2024-05-30 RX ORDER — FUROSEMIDE 20 MG/1
40 TABLET ORAL DAILY
Status: DISCONTINUED | OUTPATIENT
Start: 2024-05-30 | End: 2024-05-30

## 2024-05-30 RX ORDER — TRAZODONE HYDROCHLORIDE 50 MG/1
100 TABLET ORAL NIGHTLY PRN
Status: DISCONTINUED | OUTPATIENT
Start: 2024-05-30 | End: 2024-05-31 | Stop reason: HOSPADM

## 2024-05-30 RX ORDER — ERGOCALCIFEROL 1.25 MG/1
50000 CAPSULE ORAL WEEKLY
Status: DISCONTINUED | OUTPATIENT
Start: 2024-06-05 | End: 2024-05-31 | Stop reason: HOSPADM

## 2024-05-30 RX ADMIN — OXYCODONE 5 MG: 5 TABLET ORAL at 22:31

## 2024-05-30 RX ADMIN — SODIUM CHLORIDE, PRESERVATIVE FREE 10 ML: 5 INJECTION INTRAVENOUS at 22:31

## 2024-05-30 RX ADMIN — OXYCODONE 5 MG: 5 TABLET ORAL at 12:49

## 2024-05-30 RX ADMIN — FUROSEMIDE 40 MG: 20 TABLET ORAL at 08:27

## 2024-05-30 RX ADMIN — SODIUM CHLORIDE, PRESERVATIVE FREE 10 ML: 5 INJECTION INTRAVENOUS at 08:27

## 2024-05-30 RX ADMIN — FOLIC ACID 1 MG: 1 TABLET ORAL at 08:27

## 2024-05-30 RX ADMIN — OXYCODONE 5 MG: 5 TABLET ORAL at 18:11

## 2024-05-30 RX ADMIN — ONDANSETRON 4 MG: 2 INJECTION INTRAMUSCULAR; INTRAVENOUS at 08:27

## 2024-05-30 RX ADMIN — Medication 300 MG: at 08:26

## 2024-05-30 RX ADMIN — LIDOCAINE HYDROCHLORIDE 10 ML: 20 INJECTION, SOLUTION INFILTRATION; PERINEURAL at 11:45

## 2024-05-30 RX ADMIN — ONDANSETRON 4 MG: 2 INJECTION INTRAMUSCULAR; INTRAVENOUS at 18:11

## 2024-05-30 RX ADMIN — IBUPROFEN 400 MG: 400 TABLET, FILM COATED ORAL at 08:26

## 2024-05-30 RX ADMIN — SPIRONOLACTONE 100 MG: 25 TABLET ORAL at 08:26

## 2024-05-30 RX ADMIN — CYANOCOBALAMIN TAB 1000 MCG 500 MCG: 1000 TAB at 08:26

## 2024-05-30 RX ADMIN — IBUPROFEN 400 MG: 400 TABLET, FILM COATED ORAL at 03:39

## 2024-05-30 RX ADMIN — Medication 1 TABLET: at 08:27

## 2024-05-30 RX ADMIN — QUETIAPINE FUMARATE 100 MG: 100 TABLET ORAL at 22:31

## 2024-05-30 ASSESSMENT — PAIN SCALES - GENERAL
PAINLEVEL_OUTOF10: 5
PAINLEVEL_OUTOF10: 6
PAINLEVEL_OUTOF10: 6
PAINLEVEL_OUTOF10: 8
PAINLEVEL_OUTOF10: 6

## 2024-05-30 ASSESSMENT — PAIN DESCRIPTION - DESCRIPTORS
DESCRIPTORS: ACHING;DISCOMFORT;TIGHTNESS
DESCRIPTORS: ACHING;DISCOMFORT;TENDER
DESCRIPTORS: ACHING;DISCOMFORT;SHARP

## 2024-05-30 ASSESSMENT — PAIN - FUNCTIONAL ASSESSMENT
PAIN_FUNCTIONAL_ASSESSMENT: ACTIVITIES ARE NOT PREVENTED

## 2024-05-30 ASSESSMENT — PAIN DESCRIPTION - ONSET: ONSET: ON-GOING

## 2024-05-30 ASSESSMENT — PAIN DESCRIPTION - ORIENTATION
ORIENTATION: MID
ORIENTATION: MID
ORIENTATION: RIGHT;LEFT

## 2024-05-30 ASSESSMENT — PAIN DESCRIPTION - PAIN TYPE
TYPE: ACUTE PAIN;CHRONIC PAIN
TYPE: ACUTE PAIN;CHRONIC PAIN

## 2024-05-30 ASSESSMENT — PAIN DESCRIPTION - LOCATION
LOCATION: ABDOMEN

## 2024-05-30 ASSESSMENT — PAIN DESCRIPTION - FREQUENCY: FREQUENCY: CONTINUOUS

## 2024-05-30 NOTE — PROGRESS NOTES
Discussed patient and IR procedure with bedside RN, all questions answered. Will call when able to send for patient.

## 2024-05-30 NOTE — PLAN OF CARE
Problem: Pain  Goal: Verbalizes/displays adequate comfort level or baseline comfort level  5/30/2024 0754 by Chely Burks RN  Outcome: Progressing  5/30/2024 0349 by Gilda Ramos RN  Outcome: Progressing     Problem: ABCDS Injury Assessment  Goal: Absence of physical injury  5/30/2024 0754 by Chely Burks RN  Outcome: Progressing  5/30/2024 0349 by Gilda Ramos RN  Outcome: Progressing

## 2024-05-30 NOTE — CARE COORDINATION
Patient with a history of cirrhosis with portal HTN, esophageal varices, hepatitis C s/p sofosbuvir-velpatasvir, ETOH (last drink about a month ago), polysubstance (cocaine, meth, marijuana) use, PPD tobacco use, depression, anxiety, bipolar disorder is here under observation for Cirrhosis with ascites, Portal hypertension and Esophageal varices. Per internal med note today, Patient is currently on Lasix 40 mg, spironolactone 100 mg daily, plan to increase Lasix to 60 mg as tolerated by his blood pressure and BMP. IR consulted for therapeutic paracentesis, will give 25 g of albumin IV after paracentesis. GI consult for further recommendation. Per IR procedure note, Patient tolerated procedure well. 6700mL of fluid removed. Patient is from [facility]. I spoke with Ha from [facility]. She said patient can return without a new referral prior to June 1st @ 4:15 pm.   Tori Kelly RN   [phone]

## 2024-05-30 NOTE — BRIEF OP NOTE
Brief-Op Note  ______________________________________________________________      IR U/S GUIDED PARACENTESIS  SEYZ 8WE MED SURG ONC    Patient Name: Justo Chester   YOB: 1995  Medical Record Number: [id number]  Date of Procedure: 5/30/24  Room/Bed: [facility]/84-      Pre-operative Diagnosis: Ascites    Post-operative Diagnosis: Ascites    Consent: Informed consent was obtained from the patient prior to the procedure. The details of the procedure, as well is its risks, benefits, and alternatives, were explained.      Anesthesia: Local anesthesia.    Performed by: ANA MARIA Deal under on-site supervision by Radha Ponce MD.    Estimated blood loss: Minimal    Complications: None    Specimen Obtained: 6600mL of clear straw colored ascites fluid was withdrawn.    (See radiology dictation in PACs for image review and additional procedural information)    Electronically signed by ANA MARIA Deal   DD: 5/30/24  4:40 PM

## 2024-05-30 NOTE — PROGRESS NOTES
Headache, focal neurological deficits, weakness, numbness, paresthesia  Derm:  Rashes, ulcers, excoriations, bruising  Extremities:  Decreased ROM, peripheral edema, mottling      OBJECTIVE:    /73   Pulse 78   Temp 97 °F (36.1 °C) (Temporal)   Resp 18   Ht 1.829 m (6')   Wt 100.2 kg (221 lb)   SpO2 94%   BMI 29.97 kg/m²     General appearance:  awake, alert, and oriented to person, place, time, and purpose; appears stated age and cooperative; no apparent distress no labored breathing  HEENT:  Conjunctivae/corneas clear.   Neck: Supple. No jugular venous distention.   Respiratory: symmetrical; clear to auscultation bilaterally; no wheezes; no rhonchi; no rales  Cardiovascular: rhythm regular; rate controlled; no murmurs  Abdomen: Soft, distended, shifting dullness prsent  Extremities:  peripheral pulses present; no peripheral edema; no ulcers  Musculoskeletal: No clubbing, cyanosis, no bilateral lower extremity edema. Brisk capillary refill.   Skin:  No rashes  on visible skin  Neurologic: awake, alert and following commands     ASSESSMENT and PLAN:  Cirrhosis with portal hypertension  Ascites  Esophageal varices  Hepatitis C  Homeless  Bipolar disorder      Plan  Patient is currently on Lasix 40 mg, spironolactone 100 mg daily, plan to increase Lasix to 60 mg as tolerated by his blood pressure and BMP.  IR consulted for therapeutic paracentesis, will give 25 g of albumin IV after paracentesis  GI consult for further recommendation  Social service consult  Added Protonix 40 mg daily  Continue thiamine 100 mg daily      DVT Prophylaxis [] Lovenox, []  Heparin, [] SCDs, [] Ambulation   GI Prophylaxis [] PPI,  [] H2 Blocker,  [] Carafate,  [] Diet/Tube Feeds   Disposition Patient requires continued admission due to IR guided paracentesis, GI consult awaiting further recommendation   MDM [] Low, [] Moderate,[]  High  Patient's risk as above due to        Medications:  REVIEWED DAILY    Infusion Medications

## 2024-05-30 NOTE — PROGRESS NOTES
4 Eyes Skin Assessment     NAME:  Justo Chester  YOB: 1995  MEDICAL RECORD NUMBER:  [id number]    The patient is being assessed for  Admission    I agree that at least one RN has performed a thorough Head to Toe Skin Assessment on the patient. ALL assessment sites listed below have been assessed.      Areas assessed by both nurses:    Head, Face, Ears, Shoulders, Back, Chest, Arms, Elbows, Hands, Sacrum. Buttock, Coccyx, Ischium, and Legs. Feet and Heels    Bilateral heels dry, flaky, redness (blanchable)  Abdominal distention  Back redness (blanchable)        Does the Patient have a Wound? No noted wound(s)       Tomi Prevention initiated by RN: Yes  Wound Care Orders initiated by RN: No    Pressure Injury (Stage 3,4, Unstageable, DTI, NWPT, and Complex wounds) if present, place Wound referral order by RN under : No    New Ostomies, if present place, Ostomy referral order under : No     Nurse 1 eSignature: Electronically signed by Gilda Ramos RN on 5/30/24 at 3:45 AM EDT    **SHARE this note so that the co-signing nurse can place an eSignature**    Nurse 2 eSignature: {Esignature:[id number]}

## 2024-05-30 NOTE — H&P
[facility] Hospitalist Group   History and Physical      CHIEF COMPLAINT:  abdominal pain and distention    History of Present Illness:  29 y.o. male with a history of cirrhosis with portal HTN, esophageal varices, hepatitis C s/p sofosbuvir-velpatasvir, ETOH (last drink about a month ago), polysubstance (cocaine, meth, marijuana) use, PPD tobacco use, depression, anxiety, bipolar disorder presents with abdominal pain and distention.  Reports he is homeless, tries to get himself admitted to facilities so that he has a place to live until they kick him out.  [facility]ation.  States for the past 2 months since having cholecystectomy he has had recurrent abdominal distention requiring paracentesis every few days.  Doesn't think he has seen GI since moving to this area.  Asked the ED physician if she would do a TIPS for him in the ED.  Was in ED on 5/27 and paracentesis done in ED.  Workup in ED significant for creatinine 1.3, alk phos 161, ALT 69, AST 79, lipase 66.  CT abd/pelvis hepatic cirrhosis with portal HTN and large volume ascites.  Given fentanyl, zofran in ED.  Patient asking for ibuprofen for his abdominal pain.    Informant(s) for H&P: patient, chart    REVIEW OF SYSTEMS:  no fevers, chills, cp, sob, n/v, ha, vision/hearing changes, hot/cold flashes, other open skin lesions, diarrhea, constipation, dysuria/hematuria unless noted in HPI. Complete ROS performed with the patient and is otherwise negative.      PMH:  Past Medical History:   Diagnosis Date    Anxiety     Depression        Surgical History:  Past Surgical History:   Procedure Laterality Date    CHOLECYSTECTOMY, LAPAROSCOPIC N/A 4/11/2024    LAPAROSCOPIC CHOLECYSTECTOMY WITH INTRAOPERATIVE CHOLANGIOGRAM performed by Gladis Garcia MD at [facility] OR    SKIN BIOPSY         Medications Prior to Admission:    Prior to Admission medications    Medication Sig Start Date End Date  ORDERING SYSTEM PROVIDED HISTORY: short of breath TECHNOLOGIST PROVIDED HISTORY: Reason for exam:->short of breath FINDINGS: Minimal basilar atelectasis more to the left.  Stable appearance of the cardiomediastinal silhouette allowing for technical differences.   Mildly low lung volumes     Minimal hypoventilation with atelectasis, in conjunction with the large volume of ascites.     CT ABDOMEN PELVIS W IV CONTRAST Additional Contrast? None    Result Date: 5/29/2024  EXAMINATION: CT OF THE ABDOMEN AND PELVIS WITH CONTRAST 5/29/2024 7:01 pm TECHNIQUE: CT of the abdomen and pelvis was performed with the administration of intravenous contrast. Multiplanar reformatted images are provided for review. Automated exposure control, iterative reconstruction, and/or weight based adjustment of the mA/kV was utilized to reduce the radiation dose to as low as reasonably achievable. COMPARISON: 6 days prior HISTORY: ORDERING SYSTEM PROVIDED HISTORY: abdominal pain and distension TECHNOLOGIST PROVIDED HISTORY: Reason for exam:->abdominal pain and distension Additional Contrast?->None Decision Support Exception - unselect if not a suspected or confirmed emergency medical condition->Emergency Medical Condition (MA) What reading provider will be dictating this exam?->CRC FINDINGS: Lower Chest:   Minimal lung base atelectasis Organs: Hepatic cirrhosis with splenomegaly and varices redemonstrated. Cholecystectomy.  Tiny hypodensity too small to characterize image 27 at the hepatic dome.  Previous low-attenuation lesion described is not well seen at this time.  None the less, follow-up MRI is again recommended with patient being high risk small left renal calculus. GI/Bowel: Not dilated.  Possible portal colopathy proximally located. Indistinct appearance around the duodenum again seen uncertain if simply fluid related. Pelvis: No significant change Peritoneum/Retroperitoneum: Large volume of ascites.  Mild lymph node prominence there is

## 2024-05-30 NOTE — OP NOTE
Operative Note  ______________________________________________________________      IR U/S GUIDED PARACENTESIS  SEYZ 8WE MED SURG ONC    Patient Name: Justo Chester   YOB: 1995  Medical Record Number: [id number]  Date of Procedure: 5/30/24  Room/Bed: [facility]84[facility]    Pre-operative Diagnosis: Ascites    Post-operative Diagnosis: Ascites    Consent: Informed consent was obtained from the patient prior to the procedure. The details of the procedure, as well is its risks, benefits, and alternatives, were explained.      Anesthesia: Local anesthesia with approximately 10mL of 2% Lidocaine without epinephrine administered subcutaneously.    Performed by: ANA MARIA Deal under on-site supervision by Radha Ponce MD.    Estimated blood loss: Minimal    Complications: None    Specimens Obtained: Ascites Fluid    Procedure: Routine scanning of all four abdominal quadrants was performed using real-time ultrasound and revealed sufficient amount of ascites fluid present.  Decision was made to proceed with procedure.  After obtaining consent, a \"Time-Out\" was called to verify the correct patient, procedure/location, allergies, relevant medications held for procedure and that all equipment is functioning and available. The patient was then placed in the supine position with the head of the bed slightly elevated and the appropriate landmarks were identified. The skin over the puncture site in the right lower quadrant region was prepped with betadine and draped in a sterile fashion. Local anesthesia was obtained by infiltration using 2% Lidocaine without epinephrine. A 6 [place] safe-t-centesis catheter was then advanced into the abdominal cavity. Fluid return was clear straw colored.      A total volume of  6600mL was withdrawn. The catheter was then withdrawn and a sterile dressing was placed over the site. The patient tolerated the procedure well.     Complications: None.     Estimated Blood Loss: < 10cc.     End of  procedure note..       Electronically signed by ANA MARIA Deal   DD: 5/30/24  4:40 PM

## 2024-05-30 NOTE — CONSULTS
[facility]   Gastroenterology, Hepatology, &  Advanced Endoscopy    Consult     Recurrent Ascites  ASSESSMENT AND PLAN:  Polysubstance abuse, cirrhosis, recurrent ascites  - Paracentesis completed  - Fluids for analysis  - Lasix 60 mg qd w monitoring of BP and lytes.  - Aldactone 100 mg qd  -Protonix 40 mg qd  - thiamine 100 mg   - aggressive diuretic management.    drained 5/22/24 at hospital in [place] for 4 L   5/27/24 paracentesis done in ED     HISTORY OF PRESENT ILLNESS:    Patient admitted on 5/29/2024 for Abdominal pain   29 y.o. male with a history of cirrhosis with portal HTN, esophageal varices, hepatitis C s/p sofosbuvir-velpatasvir, ETOH, polysubstance (cocaine, meth, marijuana) use, PPD tobacco use, depression, anxiety, bipolar disorder presents with abdominal pain and distention.  Patient reports that he did drink a tall boys  and prior to that he would drink a bottle of whiskey but the last time he really drink was 1 month ago. Patient does admit to being homeless. Patient reports that he does not have any where to stay and he reports that when he was in [place] he was at [facility] and prior to that he has been at [facility] as well as [facility] and other drug rehab centers all recently. Patient stated that his plan will be to drink himself to death last ED visit. Patient admits to drug use but when questioned further he states that it is the opiates that he is prescribed in the ER but no other opiates     5/29/24 CT A/P w IV contrast  FINDINGS:  Lower Chest:   Minimal lung base atelectasis     Organs: Hepatic cirrhosis with splenomegaly and varices redemonstrated.  Cholecystectomy.  Tiny hypodensity too small to characterize image 27 at the  hepatic dome.  Previous low-attenuation lesion described is not well seen at this time.  None the less, follow-up MRI is again recommended with patient being high risk small left renal calculus.     GI/Bowel: Not dilated.  Possible portal colopathy proximally

## 2024-05-30 NOTE — OR NURSING
Patient tolerated procedure well. 6700mL of fluid removed. Glue, gauze, and tegaderm placed at site of PARA

## 2024-05-31 VITALS
TEMPERATURE: 97.9 F | WEIGHT: 221 LBS | HEART RATE: 73 BPM | SYSTOLIC BLOOD PRESSURE: 112 MMHG | OXYGEN SATURATION: 94 % | HEIGHT: 72 IN | DIASTOLIC BLOOD PRESSURE: 61 MMHG | BODY MASS INDEX: 29.93 KG/M2 | RESPIRATION RATE: 18 BRPM

## 2024-05-31 PROBLEM — R18.8 OTHER ASCITES: Status: ACTIVE | Noted: 2024-05-30

## 2024-05-31 LAB
ANION GAP SERPL CALCULATED.3IONS-SCNC: 11 MMOL/L (ref 7–16)
BASOPHILS # BLD: 0.04 K/UL (ref 0–0.2)
BASOPHILS NFR BLD: 1 % (ref 0–2)
BUN SERPL-MCNC: 19 MG/DL (ref 6–20)
CALCIUM SERPL-MCNC: 8.3 MG/DL (ref 8.6–10.2)
CHLORIDE SERPL-SCNC: 104 MMOL/L (ref 98–107)
CO2 SERPL-SCNC: 22 MMOL/L (ref 22–29)
CREAT SERPL-MCNC: 1 MG/DL (ref 0.7–1.2)
EOSINOPHIL # BLD: 0.33 K/UL (ref 0.05–0.5)
EOSINOPHILS RELATIVE PERCENT: 6 % (ref 0–6)
ERYTHROCYTE [DISTWIDTH] IN BLOOD BY AUTOMATED COUNT: 13.6 % (ref 11.5–15)
GFR, ESTIMATED: >90 ML/MIN/1.73M2
GLUCOSE SERPL-MCNC: 88 MG/DL (ref 74–99)
HCT VFR BLD AUTO: 35.2 % (ref 37–54)
HGB BLD-MCNC: 12.2 G/DL (ref 12.5–16.5)
IMM GRANULOCYTES # BLD AUTO: <0.03 K/UL (ref 0–0.58)
IMM GRANULOCYTES NFR BLD: 0 % (ref 0–5)
LYMPHOCYTES NFR BLD: 1.16 K/UL (ref 1.5–4)
LYMPHOCYTES RELATIVE PERCENT: 23 % (ref 20–42)
MAGNESIUM SERPL-MCNC: 1.8 MG/DL (ref 1.6–2.6)
MCH RBC QN AUTO: 33.4 PG (ref 26–35)
MCHC RBC AUTO-ENTMCNC: 34.7 G/DL (ref 32–34.5)
MCV RBC AUTO: 96.4 FL (ref 80–99.9)
MONOCYTES NFR BLD: 0.72 K/UL (ref 0.1–0.95)
MONOCYTES NFR BLD: 14 % (ref 2–12)
NEUTROPHILS NFR BLD: 56 % (ref 43–80)
NEUTS SEG NFR BLD: 2.86 K/UL (ref 1.8–7.3)
PLATELET, FLUORESCENCE: 121 K/UL (ref 130–450)
PMV BLD AUTO: 12.2 FL (ref 7–12)
POTASSIUM SERPL-SCNC: 3.8 MMOL/L (ref 3.5–5)
RBC # BLD AUTO: 3.65 M/UL (ref 3.8–5.8)
SODIUM SERPL-SCNC: 137 MMOL/L (ref 132–146)
WBC OTHER # BLD: 5.1 K/UL (ref 4.5–11.5)

## 2024-05-31 PROCEDURE — 36415 COLL VENOUS BLD VENIPUNCTURE: CPT

## 2024-05-31 PROCEDURE — G0378 HOSPITAL OBSERVATION PER HR: HCPCS

## 2024-05-31 PROCEDURE — 83735 ASSAY OF MAGNESIUM: CPT

## 2024-05-31 PROCEDURE — 6370000000 HC RX 637 (ALT 250 FOR IP): Performed by: FAMILY MEDICINE

## 2024-05-31 PROCEDURE — 99239 HOSP IP/OBS DSCHRG MGMT >30: CPT | Performed by: STUDENT IN AN ORGANIZED HEALTH CARE EDUCATION/TRAINING PROGRAM

## 2024-05-31 PROCEDURE — 96376 TX/PRO/DX INJ SAME DRUG ADON: CPT

## 2024-05-31 PROCEDURE — 80048 BASIC METABOLIC PNL TOTAL CA: CPT

## 2024-05-31 PROCEDURE — 99232 SBSQ HOSP IP/OBS MODERATE 35: CPT | Performed by: NURSE PRACTITIONER

## 2024-05-31 PROCEDURE — 6360000002 HC RX W HCPCS: Performed by: STUDENT IN AN ORGANIZED HEALTH CARE EDUCATION/TRAINING PROGRAM

## 2024-05-31 PROCEDURE — 96365 THER/PROPH/DIAG IV INF INIT: CPT

## 2024-05-31 PROCEDURE — 2580000003 HC RX 258: Performed by: FAMILY MEDICINE

## 2024-05-31 PROCEDURE — 85025 COMPLETE CBC W/AUTO DIFF WBC: CPT

## 2024-05-31 PROCEDURE — 96366 THER/PROPH/DIAG IV INF ADDON: CPT

## 2024-05-31 PROCEDURE — P9047 ALBUMIN (HUMAN), 25%, 50ML: HCPCS | Performed by: STUDENT IN AN ORGANIZED HEALTH CARE EDUCATION/TRAINING PROGRAM

## 2024-05-31 PROCEDURE — 6370000000 HC RX 637 (ALT 250 FOR IP): Performed by: STUDENT IN AN ORGANIZED HEALTH CARE EDUCATION/TRAINING PROGRAM

## 2024-05-31 RX ORDER — PANTOPRAZOLE SODIUM 40 MG/1
40 TABLET, DELAYED RELEASE ORAL
Qty: 7 TABLET | Refills: 0 | Status: SHIPPED | OUTPATIENT
Start: 2024-06-01 | End: 2024-06-08

## 2024-05-31 RX ORDER — FUROSEMIDE 40 MG/1
60 TABLET ORAL DAILY
Qty: 60 TABLET | Refills: 3 | Status: SHIPPED | OUTPATIENT
Start: 2024-05-31

## 2024-05-31 RX ORDER — SPIRONOLACTONE 100 MG/1
150 TABLET, FILM COATED ORAL DAILY
Qty: 30 TABLET | Refills: 3 | Status: SHIPPED | OUTPATIENT
Start: 2024-05-31

## 2024-05-31 RX ORDER — ALBUMIN (HUMAN) 12.5 G/50ML
25 SOLUTION INTRAVENOUS ONCE
Status: COMPLETED | OUTPATIENT
Start: 2024-05-31 | End: 2024-05-31

## 2024-05-31 RX ADMIN — FUROSEMIDE 60 MG: 40 TABLET ORAL at 08:29

## 2024-05-31 RX ADMIN — SODIUM CHLORIDE, PRESERVATIVE FREE 10 ML: 5 INJECTION INTRAVENOUS at 08:29

## 2024-05-31 RX ADMIN — FOLIC ACID 1 MG: 1 TABLET ORAL at 08:30

## 2024-05-31 RX ADMIN — CYANOCOBALAMIN TAB 1000 MCG 500 MCG: 1000 TAB at 08:30

## 2024-05-31 RX ADMIN — OXYCODONE 5 MG: 5 TABLET ORAL at 09:54

## 2024-05-31 RX ADMIN — SPIRONOLACTONE 100 MG: 25 TABLET ORAL at 08:30

## 2024-05-31 RX ADMIN — ALBUMIN (HUMAN) 25 G: 0.25 INJECTION, SOLUTION INTRAVENOUS at 08:40

## 2024-05-31 RX ADMIN — Medication 300 MG: at 08:30

## 2024-05-31 RX ADMIN — ONDANSETRON 4 MG: 2 INJECTION INTRAMUSCULAR; INTRAVENOUS at 05:14

## 2024-05-31 RX ADMIN — Medication 1 TABLET: at 08:30

## 2024-05-31 RX ADMIN — PANTOPRAZOLE SODIUM 40 MG: 40 TABLET, DELAYED RELEASE ORAL at 05:15

## 2024-05-31 RX ADMIN — OXYCODONE 5 MG: 5 TABLET ORAL at 05:15

## 2024-05-31 ASSESSMENT — PAIN DESCRIPTION - PAIN TYPE: TYPE: ACUTE PAIN

## 2024-05-31 ASSESSMENT — PAIN DESCRIPTION - ONSET: ONSET: ON-GOING

## 2024-05-31 ASSESSMENT — PAIN SCALES - GENERAL
PAINLEVEL_OUTOF10: 4
PAINLEVEL_OUTOF10: 6
PAINLEVEL_OUTOF10: 6

## 2024-05-31 ASSESSMENT — PAIN DESCRIPTION - FREQUENCY: FREQUENCY: INTERMITTENT

## 2024-05-31 ASSESSMENT — PAIN DESCRIPTION - LOCATION: LOCATION: ABDOMEN

## 2024-05-31 ASSESSMENT — PAIN DESCRIPTION - ORIENTATION: ORIENTATION: RIGHT;LEFT;LOWER

## 2024-05-31 ASSESSMENT — PAIN DESCRIPTION - DESCRIPTORS: DESCRIPTORS: DISCOMFORT;PRESSURE;CRAMPING

## 2024-05-31 NOTE — PROGRESS NOTES
[facility]   Gastroenterology, Hepatology, &  Advanced Endoscopy      Recurrent Ascites  ASSESSMENT AND PLAN:    Pt wishes to be established with a PCP and GI in the [place] of [place] as this is where his children are, as well as his support system.    OK for discharge from GI POV.  -Feels well today with no complaints.    Polysubstance abuse, cirrhosis, recurrent ascites  - Paracentesis completed -  total volume of  6600mL was withdrawn   - SPA  + SAAG  - Fluids for analysis  - Lasix 60 mg qd w monitoring of BP and lytes.  - Aldactone 100 mg qd  -Protonix 40 mg qd  - thiamine 100 mg   - aggressive diuretic management.    drained 5/22/24 at hospital in [place] for 4 L   5/27/24 paracentesis done in ED     HISTORY OF PRESENT ILLNESS:    Patient admitted on 5/29/2024 for Abdominal pain   29 y.o. male with a history of cirrhosis with portal HTN, esophageal varices, hepatitis C s/p sofosbuvir-velpatasvir, ETOH, polysubstance (cocaine, meth, marijuana) use, PPD tobacco use, depression, anxiety, bipolar disorder presents with abdominal pain and distention.  Patient reports that he did drink a tall boys  and prior to that he would drink a bottle of whiskey but the last time he really drink was 1 month ago. Patient does admit to being homeless. Patient reports that he does not have any where to stay and he reports that when he was in [place] he was at [facility] and prior to that he has been at [facility] as well as [facility] and other drug rehab centers all recently. Patient stated that his plan will be to drink himself to death last ED visit. Patient admits to drug use but when questioned further he states that it is the opiates that he is prescribed in the ER but no other opiates     5/29/24 CT A/P w IV contrast  FINDINGS:  Lower Chest:   Minimal lung base atelectasis     Organs: Hepatic cirrhosis with splenomegaly and varices redemonstrated.  Cholecystectomy.  Tiny hypodensity too small to characterize image 27 at  [acetaminophen]    ROS:  Aside from what was mentioned in the past medical history and history of present illness, essentially unremarkable, all others are negative.      Recent Labs     05/28/24  0923 05/29/24  1752 05/30/24  0429   INR  --  1.2 1.3   ALT 62* 69*  --    AST 70* 79*  --    ALKPHOS 132* 161*  --    BILITOT 0.8 1.0  --    BILIDIR  --  0.4*  --        Lab Results   Component Value Date    WBC 5.1 05/31/2024    HGB 12.2 (L) 05/31/2024    HCT 35.2 (L) 05/31/2024     (L) 05/30/2024     05/31/2024    K 3.8 05/31/2024     05/31/2024    CREATININE 1.0 05/31/2024    BUN 19 05/31/2024    CO2 22 05/31/2024    AMMONIA 44 05/28/2024    GLUCOSE 88 05/31/2024    INR 1.3 05/30/2024    APTT 35.6 (H) 05/29/2024         PHYSICAL EXAM:  /61   Pulse 73   Temp 97.9 °F (36.6 °C) (Temporal)   Resp 18   Ht 1.829 m (6')   Wt 100.2 kg (221 lb)   SpO2 94%   BMI 29.97 kg/m²   Physical Exam:  General: Overall well-appearing, NAD  HEENT: PERRLA, EOMI, Anicteric sclera, MMM, no rhinorrhea  Cards: RRR, no LE edema  Resp: Breathing comfortably on room air, good air movement, no use of accessory muscles, no audible wheezing  Abdomen: mild distention  Extremities: Moves all extremities, no effusions or bruising.  Skin: No rashes or jaundice, multiple tattoos  Neuro: A&O x 3, CN grossly intact, non-focal exam     Greater than or equal to 35 minutes was spent providing face-to-face patient care, including:  and coordinating care, reviewing the chart, labs, and diagnostics, as well as medical decision making. Greater than 50% of this time was spent instructing and counseling the patient face to face regarding findings and recommendations.    Thank you for including us in the care of this patient. Please do not hesitate to contact us with any additional questions or concerns.      Discussed with Dr. Wolf.  Electronically signed by MERCEDES Sandoval CNP on 5/31/2024 at 8:43 AM

## 2024-05-31 NOTE — DISCHARGE SUMMARY
Hospital Medicine Discharge Summary    Patient ID: Justo Chester      Patient's PCP: Belle Pelletier MD    Admit Date: 5/29/2024     Discharge Date:   05/31/2024    Admitting Physician: Yisel Delgado DO     Discharge Physician: Philip Galdamez MD     Discharge Diagnoses:  Cirrhosis with portal hypertension  Ascites refractory to medical therapy  Esophageal varices  Hepatitis C     Active Hospital Problems    Diagnosis Date Noted    Ascites due to alcoholic cirrhosis (HCC) [K70.31] 05/30/2024    Abdominal pain [R10.9] 05/29/2024    Decompensated liver disease (HCC) [K74.69] 05/16/2024    Marijuana use [F12.90] 04/02/2024    Alcohol use [Z78.9] 04/02/2024    Homeless [Z59.00] 09/13/2023    Generalized abdominal pain [R10.84] 08/27/2023    Nicotine use disorder [F17.200] 08/23/2023    Bipolar II disorder, most recent episode major depressive (HCC) [F31.81] 06/25/2021       The patient was seen and examined on day of discharge and this discharge summary is in conjunction with any daily progress note from day of discharge.    Hospital Course:     29 y.o. male with a history of cirrhosis with portal HTN, esophageal varices, hepatitis C s/p sofosbuvir-velpatasvir, ETOH (last drink about a month ago), polysubstance (cocaine, meth, marijuana) use, PPD tobacco use, depression, anxiety, bipolar disorder presents with abdominal pain and distention states for the past 2 months since having cholecystectomy he has had recurrent abdominal distention requiring paracentesis every few days.    Was in ED on 5/27 and paracentesis done in ED.     Workup in ED significant for creatinine 1.3, alk phos 161, ALT 69, AST 79, lipase 66. CT abd/pelvis hepatic cirrhosis with portal HTN and large volume ascites. Given fentanyl, zofran in ED.     Admitted under internal medicine with GI consult, IR consult    Patient underwent paracentesis with removal of 6700 cc ascites fluid, he received 25 g IV albumin following the procedure.  Patient  states marked improvement in his symptoms.    His Lasix was increased to 60 mg daily, Aldactone increased to 150 mg daily as tolerated by his blood pressure and renal function test.  Ideal plan would be to increase his Lasix to 160 mg, Aldactone to 400 mg, outpatient setting with monitoring his blood pressure and renal function test.    Pt wishes to be established with a PCP and GI in the [place] as this is where his children are, as well as his support system.     Patient can be discharged to follow-up with his PCP and GI at [facility] outpatient setting    Exam:     /61   Pulse 73   Temp 97.9 °F (36.6 °C) (Temporal)   Resp 18   Ht 1.829 m (6')   Wt 100.2 kg (221 lb)   SpO2 94%   BMI 29.97 kg/m²     General appearance: No apparent distress, appears stated age and cooperative.  HEENT: Pupils equal, round, and reactive to light. Conjunctivae/corneas clear.  Neck: Supple, with full range of motion. No jugular venous distention. Trachea midline.  Respiratory:  Normal respiratory effort. Clear to auscultation, bilaterally without Rales/Wheezes/Rhonchi.  Cardiovascular: Regular rate and rhythm with normal S1/S2 without murmurs, rubs or gallops.  Abdomen: Soft, non-tender, distended with normal bowel sounds.  Musculoskeletal: No clubbing, cyanosis or edema bilaterally.  Full range of motion without deformity.  Skin: Skin color, texture, turgor normal.  No rashes or lesions.  Neurologic:  Neurovascularly intact without any focal sensory/motor deficits. Cranial nerves: II-XII intact, grossly non-focal.  Psychiatric: Alert and oriented, thought content appropriate, normal insight      Consults:     IP CONSULT TO HOSPITALIST  IP CONSULT TO GI    Significant Diagnostic Studies:   CT abdomen pelvis with IV contrast    Disposition: [facility]    Discharge Instructions/Follow-up:  As documented    Code Status:  Full Code     Activity: activity as tolerated    Condition: Stable at the time of

## 2024-05-31 NOTE — DISCHARGE INSTR - COC
Continuity of Care Form    Patient Name: Justo Chester   :  1995  MRN:  [id number]    Admit date:  2024  Discharge date:  2024    Code Status Order: Full Code   Advance Directives:     Admitting Physician:  Yisel Delgado DO  PCP: No primary care provider on file.    Discharging Nurse:   Discharging Hospital Unit/Room#: 8407/8407-B  Discharging Unit Phone Number: [phone]    Emergency Contact:   Extended Emergency Contact Information  Primary Emergency Contact: Montserrat Castellanos  Mobile Phone: [phone]  Relation: Friend    Past Surgical History:  Past Surgical History:   Procedure Laterality Date    CHOLECYSTECTOMY, LAPAROSCOPIC N/A 2024    LAPAROSCOPIC CHOLECYSTECTOMY WITH INTRAOPERATIVE CHOLANGIOGRAM performed by Gladis Garcia MD at [facility] OR    SKIN BIOPSY         Immunization History:   Immunization History   Administered Date(s) Administered    COVID-19, PFIZER PURPLE top, DILUTE for use, (age 12 y+), 30mcg/0.3mL 2021, 2021       Active Problems:  Patient Active Problem List   Diagnosis Code    Cholecystitis K81.9    Marijuana use F12.90    Alcohol use Z78.9    Acute cholecystitis K81.0    Abdominal pain R10.9    Bipolar II disorder, most recent episode major depressive (HCC) F31.81    Decompensated liver disease (HCC) K74.69    Homeless Z59.00    Nicotine use disorder F17.200    Generalized anxiety disorder F41.1    Generalized abdominal pain R10.84    Ascites due to alcoholic cirrhosis (HCC) K70.31       Isolation/Infection:   Isolation            No Isolation          Patient Infection Status       None to display            Nurse Assessment:  Last Vital Signs: /61   Pulse 73   Temp 97.9 °F (36.6 °C) (Temporal)   Resp 18   Ht 1.829 m (6')   Wt 100.2 kg (221 lb)   SpO2 94%   BMI 29.97 kg/m²     Last documented pain score (0-10 scale): Pain Level: 6  Last Weight:   Wt Readings from Last 1 Encounters:   24 100.2 kg (221 lb)     Mental Status:  oriented

## 2024-05-31 NOTE — PLAN OF CARE
Problem: Pain  Goal: Verbalizes/displays adequate comfort level or baseline comfort level  5/31/2024 1031 by Elle Diaz RN  Outcome: Adequate for Discharge  5/30/2024 2241 by Branden Greene RN  Outcome: Progressing     Problem: ABCDS Injury Assessment  Goal: Absence of physical injury  5/31/2024 1031 by Elle Diaz RN  Outcome: Adequate for Discharge  5/30/2024 2241 by Branden Greene RN  Outcome: Progressing

## 2024-05-31 NOTE — CARE COORDINATION
Discharge order noted. Patient was here under observation for recurrent ascites. Paracentesis completed -  total volume of  6600mL was withdrawn Transportation set up via [facility] for 11 am to [facility] today. Charge nurse, RN, [facility] and patient all notified. JASON complete. Completed and signed ambulance form in envelope in soft chart.  Tori Kelly RN   [phone]

## 2024-06-01 LAB
EKG ATRIAL RATE: 85 BPM
EKG P AXIS: 20 DEGREES
EKG P-R INTERVAL: 142 MS
EKG Q-T INTERVAL: 392 MS
EKG QRS DURATION: 80 MS
EKG QTC CALCULATION (BAZETT): 466 MS
EKG R AXIS: 3 DEGREES
EKG T AXIS: 0 DEGREES
EKG VENTRICULAR RATE: 85 BPM

## 2024-06-01 PROCEDURE — 93010 ELECTROCARDIOGRAM REPORT: CPT | Performed by: INTERNAL MEDICINE

## (undated) DEVICE — ANCHOR TISSUE RETRIEVAL SYSTEM, BAG SIZE 175 ML, PORT SIZE 10 MM: Brand: ANCHOR TISSUE RETRIEVAL SYSTEM

## (undated) DEVICE — AGENT HEMSTAT W2XL4IN OXIDIZED REGENERATED CELOS ABSRB

## (undated) DEVICE — LAPAROSCOPIC SCISSORS: Brand: EPIX LAPAROSCOPIC SCISSORS

## (undated) DEVICE — MARKER,SKIN,WI/RULER AND LABELS: Brand: MEDLINE

## (undated) DEVICE — WIPES SKIN CLOTH READYPREP 9 X 10.5 IN 2% CHLORHEX GLUCONATE CHG PREOP

## (undated) DEVICE — TROCAR: Brand: KII SLEEVE

## (undated) DEVICE — APPLIER CLP M L L11.4IN DIA10MM ENDOSCP ROT MULT FOR LIG

## (undated) DEVICE — INSUFFLATION NEEDLE TO ESTABLISH PNEUMOPERITONEUM.: Brand: INSUFFLATION NEEDLE

## (undated) DEVICE — COVER,LIGHT HANDLE,FLX,1/PK: Brand: MEDLINE INDUSTRIES, INC.

## (undated) DEVICE — APPLICATOR MEDICATED 26 CC SOLUTION HI LT ORNG CHLORAPREP

## (undated) DEVICE — GLOVE SURG SZ 65 THK91MIL LTX FREE SYN POLYISOPRENE

## (undated) DEVICE — SYRINGE MED 10ML TRNSLUC BRL PLUNG BLK MRK POLYPR CTRL

## (undated) DEVICE — PLUMEPORT ACTIV LAPAROSCOPIC SMOKE FILTRATION DEVICE: Brand: PLUMEPORT ACTIVE

## (undated) DEVICE — [HIGH FLOW INSUFFLATOR,  DO NOT USE IF PACKAGE IS DAMAGED,  KEEP DRY,  KEEP AWAY FROM SUNLIGHT,  PROTECT FROM HEAT AND RADIOACTIVE SOURCES.]: Brand: PNEUMOSURE

## (undated) DEVICE — TROCAR: Brand: KII FIOS FIRST ENTRY

## (undated) DEVICE — SYRINGE MED 10ML LUERLOCK TIP W/O SFTY DISP

## (undated) DEVICE — GLOVE ORANGE PI 7 1/2   MSG9075

## (undated) DEVICE — LIQUIBAND RAPID ADHESIVE 36/CS 0.8ML: Brand: MEDLINE

## (undated) DEVICE — PACK SURG LAP CHOLE CUSTOM

## (undated) DEVICE — GOWN,SIRUS,NONRNF,SETINSLV,XL,20/CS: Brand: MEDLINE

## (undated) DEVICE — MEDIA CONTRAST ISOVUE 250 100ML

## (undated) DEVICE — PUMP SUC IRR TBNG L10FT W/ HNDPC ASSEMB STRYKEFLOW 2

## (undated) DEVICE — TOWEL,OR,DSP,ST,BLUE,STD,6/PK,12PK/CS: Brand: MEDLINE

## (undated) DEVICE — GARMENT,MEDLINE,DVT,INT,CALF,MED, GEN2: Brand: MEDLINE

## (undated) DEVICE — COOK ENDOSCOPIC CHOLANGIOGRAPHY SET: Brand: COOK

## (undated) DEVICE — 4-PORT MANIFOLD: Brand: NEPTUNE 2

## (undated) DEVICE — ELECTRODE PT RET AD L9FT HI MOIST COND ADH HYDRGEL CORDED

## (undated) DEVICE — GOWN,SIRUS,POLYRNF,BRTHSLV,XLN/XXL,18/CS: Brand: MEDLINE

## (undated) DEVICE — SHEET,DRAPE,40X58,STERILE: Brand: MEDLINE

## (undated) DEVICE — LAPAROSCOPIC WIRE L HK 45 CM

## (undated) DEVICE — BASIC DOUBLE BASIN 2-LF: Brand: MEDLINE INDUSTRIES, INC.

## (undated) DEVICE — NEEDLE HYPO 25GA L1.5IN BLU POLYPR HUB S STL REG BVL STR

## (undated) DEVICE — DRAPE C ARM W41XL65IN UNIV W/ CLP AND RUBBERBAND